# Patient Record
Sex: FEMALE | Race: WHITE | NOT HISPANIC OR LATINO | Employment: FULL TIME | URBAN - METROPOLITAN AREA
[De-identification: names, ages, dates, MRNs, and addresses within clinical notes are randomized per-mention and may not be internally consistent; named-entity substitution may affect disease eponyms.]

---

## 2019-08-19 ENCOUNTER — TRANSCRIBE ORDERS (OUTPATIENT)
Dept: ADMINISTRATIVE | Facility: HOSPITAL | Age: 29
End: 2019-08-19

## 2019-08-19 DIAGNOSIS — Z31.41 FERTILITY TESTING: Primary | ICD-10-CM

## 2019-08-29 ENCOUNTER — HOSPITAL ENCOUNTER (OUTPATIENT)
Dept: GENERAL RADIOLOGY | Facility: HOSPITAL | Age: 29
Discharge: HOME | End: 2019-08-29
Admitting: SPECIALIST

## 2019-08-29 DIAGNOSIS — Z31.41 FERTILITY TESTING: ICD-10-CM

## 2019-08-29 PROCEDURE — 25010000002 IOPAMIDOL 61 % SOLUTION: Performed by: SPECIALIST

## 2019-08-29 PROCEDURE — 74740 X-RAY FEMALE GENITAL TRACT: CPT

## 2019-08-29 RX ADMIN — IOPAMIDOL 10 ML: 612 INJECTION, SOLUTION INTRAVENOUS at 08:19

## 2020-01-04 PROBLEM — Z01.419 WELL WOMAN EXAM: Status: ACTIVE | Noted: 2020-01-04

## 2020-01-06 ENCOUNTER — OFFICE VISIT (OUTPATIENT)
Dept: OBSTETRICS AND GYNECOLOGY | Facility: CLINIC | Age: 30
End: 2020-01-06

## 2020-01-06 VITALS — DIASTOLIC BLOOD PRESSURE: 68 MMHG | RESPIRATION RATE: 14 BRPM | SYSTOLIC BLOOD PRESSURE: 112 MMHG | WEIGHT: 197 LBS

## 2020-01-06 DIAGNOSIS — N97.9 FEMALE INFERTILITY: Primary | ICD-10-CM

## 2020-01-06 PROCEDURE — 99203 OFFICE O/P NEW LOW 30 MIN: CPT | Performed by: OBSTETRICS & GYNECOLOGY

## 2020-01-06 NOTE — PROGRESS NOTES
Subjective   Chief Complaint   Patient presents with   • Fertility Preservation     Izzy Hernandez is a 29 y.o. year old .  Patient's last menstrual period was 2019 (approximate).  She presents to be seen because of wanting to discuss fertility.  She is been trying to conceive for over a year.  She is previously been a patient of [facility].  They have seen Dr. Quevedo but of overall not been happy with care and are looking for alternative options.  Work-up is included an HSG which was normal.  's semen analysis was abnormal.  Her  has seen Dr. Graves for a testicular exam which was normal.  Antibiotics were empirically tried.  They tried IUI once without success.  She has had no blood work today.    She is taking a prenatal vitamin.    Cycles are predictable and normal.  They occur roughly every 28 to 30 days.  She has no issues with nipple discharge, diplopia or significant recurring headaches.    The following portions of the patient's history were reviewed and updated as appropriate:current medications, allergies, past family history, past medical history, past social history and past surgical history    Social History    Tobacco Use      Smoking status: Never Smoker      Smokeless tobacco: Never Used         Objective   /68   Resp 14   Wt 89.4 kg (197 lb)   LMP 2019 (Approximate)   Breastfeeding No     Lab Review   No data reviewed    Imaging   HSG       Assessment   1. 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 At this point I think the next most pressing question is whether his semen analysis persistently abnormal.  If it remains persistently abnormal I think IUI plus or minus efforts with ovulation induction would be the next step.  If on the other hand repeat semen analysis is now normal, next up would be consideration for laparoscopic evaluation of endometriosis  2. Encourage them to see [facility] or Raul Gonzales in [place]  3. The importance of keeping all planned follow-up and taking all medications as prescribed was emphasized.  4. Follow up PRN          This note was electronically signed.    Isaias Wen M.D.  January 6, 2020    Total time spent today with Izzy  was 35 minutes (level 3).  Off this time, 90% was spent face-to-face time coordinating care, answering her questions and counseling regarding pathophysiology of her presenting problem along with plans for any diagnositc work-up and treatment.    Note: Speech recognition transcription software may have been used to create portions of this document.  An attempt at proofreading has been made but errors in transcription could still be present.

## 2020-03-16 ENCOUNTER — TELEPHONE (OUTPATIENT)
Dept: OBSTETRICS AND GYNECOLOGY | Facility: CLINIC | Age: 30
End: 2020-03-16

## 2020-03-16 NOTE — TELEPHONE ENCOUNTER
Patient scheduled New OB for 4/22/2020 and answered no to all the coronavirus questions. Negative.

## 2020-03-17 ENCOUNTER — LAB REQUISITION (OUTPATIENT)
Dept: LAB | Facility: HOSPITAL | Age: 30
End: 2020-03-17

## 2020-03-17 DIAGNOSIS — Z00.00 ROUTINE GENERAL MEDICAL EXAMINATION AT A HEALTH CARE FACILITY: ICD-10-CM

## 2020-03-17 LAB — HCG INTACT+B SERPL-ACNC: 282.3 MIU/ML

## 2020-03-17 PROCEDURE — 84702 CHORIONIC GONADOTROPIN TEST: CPT | Performed by: OBSTETRICS & GYNECOLOGY

## 2020-03-19 ENCOUNTER — LAB REQUISITION (OUTPATIENT)
Dept: LAB | Facility: HOSPITAL | Age: 30
End: 2020-03-19

## 2020-03-19 DIAGNOSIS — Z00.00 ROUTINE GENERAL MEDICAL EXAMINATION AT A HEALTH CARE FACILITY: ICD-10-CM

## 2020-03-19 LAB — HCG INTACT+B SERPL-ACNC: 662.9 MIU/ML

## 2020-03-19 PROCEDURE — 84702 CHORIONIC GONADOTROPIN TEST: CPT | Performed by: OBSTETRICS & GYNECOLOGY

## 2020-04-17 PROBLEM — Z34.00 SUPERVISION OF NORMAL FIRST PREGNANCY: Status: ACTIVE | Noted: 2020-04-17

## 2020-04-22 ENCOUNTER — APPOINTMENT (OUTPATIENT)
Dept: LAB | Facility: HOSPITAL | Age: 30
End: 2020-04-22

## 2020-04-22 ENCOUNTER — INITIAL PRENATAL (OUTPATIENT)
Dept: OBSTETRICS AND GYNECOLOGY | Facility: CLINIC | Age: 30
End: 2020-04-22

## 2020-04-22 VITALS
WEIGHT: 197 LBS | BODY MASS INDEX: 30.92 KG/M2 | DIASTOLIC BLOOD PRESSURE: 64 MMHG | SYSTOLIC BLOOD PRESSURE: 106 MMHG | HEIGHT: 67 IN

## 2020-04-22 DIAGNOSIS — Z34.01 ENCOUNTER FOR SUPERVISION OF NORMAL FIRST PREGNANCY IN FIRST TRIMESTER: Primary | ICD-10-CM

## 2020-04-22 LAB
ABO GROUP BLD: NORMAL
AMPHET+METHAMPHET UR QL: NEGATIVE
AMPHETAMINES UR QL: NEGATIVE
BARBITURATES UR QL SCN: NEGATIVE
BASOPHILS # BLD AUTO: 0.02 10*3/MM3 (ref 0–0.2)
BASOPHILS NFR BLD AUTO: 0.3 % (ref 0–1.5)
BENZODIAZ UR QL SCN: NEGATIVE
BLD GP AB SCN SERPL QL: NEGATIVE
BUPRENORPHINE SERPL-MCNC: NEGATIVE NG/ML
CANNABINOIDS SERPL QL: NEGATIVE
COCAINE UR QL: NEGATIVE
DEPRECATED RDW RBC AUTO: 38.9 FL (ref 37–54)
EOSINOPHIL # BLD AUTO: 0.03 10*3/MM3 (ref 0–0.4)
EOSINOPHIL NFR BLD AUTO: 0.4 % (ref 0.3–6.2)
ERYTHROCYTE [DISTWIDTH] IN BLOOD BY AUTOMATED COUNT: 12 % (ref 12.3–15.4)
HBV SURFACE AG SERPL QL IA: NORMAL
HCT VFR BLD AUTO: 36.7 % (ref 34–46.6)
HCV AB SER DONR QL: NORMAL
HGB BLD-MCNC: 12.9 G/DL (ref 12–15.9)
HIV1+2 AB SER QL: NORMAL
HOLD SPECIMEN: NORMAL
IMM GRANULOCYTES # BLD AUTO: 0.02 10*3/MM3 (ref 0–0.05)
IMM GRANULOCYTES NFR BLD AUTO: 0.3 % (ref 0–0.5)
LYMPHOCYTES # BLD AUTO: 1.35 10*3/MM3 (ref 0.7–3.1)
LYMPHOCYTES NFR BLD AUTO: 19.4 % (ref 19.6–45.3)
MCH RBC QN AUTO: 31.1 PG (ref 26.6–33)
MCHC RBC AUTO-ENTMCNC: 35.1 G/DL (ref 31.5–35.7)
MCV RBC AUTO: 88.4 FL (ref 79–97)
METHADONE UR QL SCN: NEGATIVE
MONOCYTES # BLD AUTO: 0.56 10*3/MM3 (ref 0.1–0.9)
MONOCYTES NFR BLD AUTO: 8 % (ref 5–12)
NEUTROPHILS # BLD AUTO: 4.99 10*3/MM3 (ref 1.7–7)
NEUTROPHILS NFR BLD AUTO: 71.6 % (ref 42.7–76)
NRBC BLD AUTO-RTO: 0 /100 WBC (ref 0–0.2)
OPIATES UR QL: NEGATIVE
OXYCODONE UR QL SCN: NEGATIVE
PCP UR QL SCN: NEGATIVE
PLATELET # BLD AUTO: 238 10*3/MM3 (ref 140–450)
PMV BLD AUTO: 11.3 FL (ref 6–12)
PROPOXYPH UR QL: NEGATIVE
RBC # BLD AUTO: 4.15 10*6/MM3 (ref 3.77–5.28)
RH BLD: POSITIVE
RPR SER QL: NORMAL
RUBV IGG SERPL IA-ACNC: POSITIVE
TRICYCLICS UR QL SCN: NEGATIVE
WBC NRBC COR # BLD: 6.97 10*3/MM3 (ref 3.4–10.8)

## 2020-04-22 PROCEDURE — 80081 OBSTETRIC PANEL INC HIV TSTG: CPT | Performed by: OBSTETRICS & GYNECOLOGY

## 2020-04-22 PROCEDURE — 86803 HEPATITIS C AB TEST: CPT | Performed by: OBSTETRICS & GYNECOLOGY

## 2020-04-22 PROCEDURE — 80306 DRUG TEST PRSMV INSTRMNT: CPT | Performed by: OBSTETRICS & GYNECOLOGY

## 2020-04-22 PROCEDURE — 0501F PRENATAL FLOW SHEET: CPT | Performed by: OBSTETRICS & GYNECOLOGY

## 2020-04-22 PROCEDURE — 36415 COLL VENOUS BLD VENIPUNCTURE: CPT | Performed by: OBSTETRICS & GYNECOLOGY

## 2020-04-22 PROCEDURE — 87086 URINE CULTURE/COLONY COUNT: CPT | Performed by: OBSTETRICS & GYNECOLOGY

## 2020-04-22 RX ORDER — CHORIOGONADOTROPIN ALFA 250 UG/.5ML
INJECTION, SOLUTION SUBCUTANEOUS
COMMUNITY
Start: 2020-02-18 | End: 2020-04-22

## 2020-04-22 RX ORDER — LETROZOLE 2.5 MG/1
TABLET, FILM COATED ORAL
COMMUNITY
Start: 2020-02-18 | End: 2020-04-22

## 2020-04-22 NOTE — PROGRESS NOTES
Subjective   Chief Complaint   Patient presents with   • Initial Prenatal Visit       Izzy Hernandez is a 30 y.o. year old .  Patient's last menstrual period was 2020.  She presents to be seen to initiate prenatal care.     Social History    Tobacco Use      Smoking status: Never Smoker      Smokeless tobacco: Never Used      The following portions of the patient's history were reviewed and updated as appropriate:vital signs, allergies, current medications, past medical history, past social history, past surgical history and problem list.    Objective   Lab Review   No data reviewed    Imaging   Pelvic ultrasound report    Assessment/Plan   ASSESSMENT  1. 30 y.o. year old  at 9w2d  2. Supervision of low risk pregnancy    PLAN  1. The problem list for pregnancy was initiated today  2. Tests ordered today:  Orders Placed This Encounter   Procedures   • Urine Culture - Urine, Urine, Clean Catch   • Chlamydia trachomatis, Neisseria gonorrhoeae, PCR w/ confirmation - Urine, Urine, Clean Catch   • Obstetric Panel   • HIV-1 / O / 2 Ag / Antibody 4th Generation   • Urine Drug Screen - Urine, Clean Catch     Please confirm all positive UDS     3. Testing for GC / Chlamydia / trichomonas was done today  4. Genetic testing reviewed: MSAFP-4, NIPT (Panorama) and they will consider the information and make a decision at a later date.  5. Information reviewed: exercise in pregnancy, nutrition in pregnancy, weight gain in pregnancy, work and travel restrictions during pregnancy, list of OTC medications acceptable in pregnancy and call coverage groups    Total time spent today with Izzy  was 30 minutes (level 4).  Off this time, 100% was spent face-to-face time coordinating care, answering her questions and counseling regarding exercise in pregnancy, nutrition in pregnancy, weight gain in pregnancy, work and travel restrictions during pregnancy, list of OTC medications acceptable in pregnancy and call  coverage groups.    Follow up: 3 week(s)       This note was electronically signed.    Isaias Wen MD  April 22, 2020

## 2020-04-22 NOTE — PROGRESS NOTES
"Subjective   Chief Complaint   Patient presents with   • Initial Prenatal Visit       Izzy Hernandez is a 30 y.o. year old .  Patient's last menstrual period was 2020.  She presents to be seen to initiate prenatal care.     Social History    Tobacco Use      Smoking status: Never Smoker      Smokeless tobacco: Never Used      The following portions of the patient's history were reviewed and updated as appropriate:vital signs, allergies, current medications, past medical history, past social history, past surgical history and problem list.    Objective   Lab Review   {Review - gyn labs:59419::\"No data reviewed\"}    Imaging   {Review - imagin::\"No data reviewed\"}    Assessment/Plan   ASSESSMENT  1. 30 y.o. year old  at ***  2. {Pregnancy Imp:04847::\"Supervision of high risk pregnancy\"}    PLAN  1. The problem list for pregnancy was initiated today  2. Tests ordered today:  No orders of the defined types were placed in this encounter.    3. Testing for GC / Chlamydia / trichomonas {Actions; was done:89983::\"will need to be done at her next prenatal visit\"}  4. Genetic testing reviewed: {Genetic testing options:15931}  5. Information reviewed: {Pregnancy - first PN visit checklist:36955::\"exercise in pregnancy\",\"nutrition in pregnancy\",\"weight gain in pregnancy\",\"work and travel restrictions during pregnancy\",\"list of OTC medications acceptable in pregnancy\",\"call coverage groups\"}    Total time spent today with Izzy  was {Time spent:12684}.  Off this time, {Percentage:39847::\"> 50%\"} was spent face-to-face time coordinating care, answering her questions and counseling regarding {Counseling topics:78912::\"pathophysiology of her presenting problem along with plans for any diagnositc work-up and treatment\"}.    Follow up: {numbers 1-12:} {DAYS, WEEKS, MONTHS, YEARS:86516}       This note was electronically signed.    Isaias Wen MD  2020      "

## 2020-04-23 LAB — BACTERIA SPEC AEROBE CULT: NORMAL

## 2020-05-13 ENCOUNTER — ROUTINE PRENATAL (OUTPATIENT)
Dept: OBSTETRICS AND GYNECOLOGY | Facility: CLINIC | Age: 30
End: 2020-05-13

## 2020-05-13 ENCOUNTER — APPOINTMENT (OUTPATIENT)
Dept: LAB | Facility: HOSPITAL | Age: 30
End: 2020-05-13

## 2020-05-13 VITALS — BODY MASS INDEX: 30.38 KG/M2 | SYSTOLIC BLOOD PRESSURE: 108 MMHG | WEIGHT: 194 LBS | DIASTOLIC BLOOD PRESSURE: 70 MMHG

## 2020-05-13 DIAGNOSIS — Z34.01 ENCOUNTER FOR SUPERVISION OF NORMAL FIRST PREGNANCY IN FIRST TRIMESTER: Primary | ICD-10-CM

## 2020-05-13 DIAGNOSIS — Z72.51 HIGH RISK HETEROSEXUAL BEHAVIOR: ICD-10-CM

## 2020-05-13 LAB
2ND TRIMESTER 4 SCREEN SERPL-IMP: NORMAL
EXTERNAL NIPT: NORMAL

## 2020-05-13 PROCEDURE — 87591 N.GONORRHOEAE DNA AMP PROB: CPT | Performed by: OBSTETRICS & GYNECOLOGY

## 2020-05-13 PROCEDURE — 87491 CHLMYD TRACH DNA AMP PROBE: CPT | Performed by: OBSTETRICS & GYNECOLOGY

## 2020-05-13 PROCEDURE — 0502F SUBSEQUENT PRENATAL CARE: CPT | Performed by: OBSTETRICS & GYNECOLOGY

## 2020-05-13 NOTE — PROGRESS NOTES
Chief Complaint   Patient presents with   • Routine Prenatal Visit       HPI: Izzy is a  currently at 12w2d who today reports the following:  Nausea - YES; Vaginal bleeding -  No; Heartburn - No.    ROS:  GI: Constipation - No; Diarrhea - No    Neuro: Headache - No; Visual change - No    Respiratory: Cough - No; SOB - No; fever - No     EXAM:  Vitals: See prenatal flowsheet   Abdomen: See prenatal flowsheet   Urine glucose/protein: See prenatal flowsheet   Pelvic: See prenatal flowsheet     Prenatal Labs  Lab Results   Component Value Date    HGB 12.9 2020    RUBELLAABIGG Positive 2020    HEPBSAG Non-Reactive 2020    ABSCRN Negative 2020    CTI1MDZ0 Non-Reactive 2020    HEPCVIRUSABY Non-Reactive 2020    URINECX <10,000 CFU/mL Mixed Nicole Isolated 2020       MDM:  Impression: 1. Supervision of low risk pregnancy   Tests done today: 1. none   Topics discussed: 1. Continue with PNV's  2. Prenatal labs reviewed  3. Questions answered regarding COVID-19 and revision of office schedule of visits due to pandemic   4. Today we discussed genetic testing.  She is aware that the MSAFP-4 and NIPT - Panorama are screening tests.  A screening test is not a diagnostic test.  This means that a negative test does not guarantee an unaffected fetus and a positive test does not mean the fetus has the condition for which the test is being performed.  If the test returns positive, a diagnostic test should be consider to determine if the fetus in fact has the condition.  After considering the options previously presented, she is not interested in having genetic testing performed.   Tests scheduled today for her next visit:   none

## 2020-05-21 LAB
C TRACH RRNA SPEC DONR QL NAA+PROBE: NEGATIVE
N GONORRHOEA DNA SPEC QL NAA+PROBE: NEGATIVE

## 2020-06-10 ENCOUNTER — TELEMEDICINE (OUTPATIENT)
Dept: OBSTETRICS AND GYNECOLOGY | Facility: CLINIC | Age: 30
End: 2020-06-10

## 2020-06-10 DIAGNOSIS — Z34.02 ENCOUNTER FOR SUPERVISION OF NORMAL FIRST PREGNANCY IN SECOND TRIMESTER: Primary | ICD-10-CM

## 2020-06-10 PROCEDURE — 0502F SUBSEQUENT PRENATAL CARE: CPT | Performed by: OBSTETRICS & GYNECOLOGY

## 2020-06-10 NOTE — PROGRESS NOTES
Chief Complaint   Patient presents with   • Routine Prenatal Visit       You have chosen to receive care through a telehealth visit.  Do you consent to use a video/audio connection for your medical care today? YES    HPI: Izzy is a  currently at 16w2d who presents via video-conference reporting the following:  Contractions - No; Leaking - No; Vaginal bleeding -  No; Swelling of extremities - No.    ROS:  GI: Nausea - No; Constipation - No; Diarrhea - No    Neuro: Headache - No; Visual change - No    Respiratory: Cough - No; SOB - No; fever - No     EXAM:  Vitals: See prenatal flowsheet   Abdomen: See prenatal flowsheet   Urine glucose/protein: See prenatal flowsheet   Pelvic: See prenatal flowsheet   MDM:   Impression: 1. Supervision of low risk pregnancy   Tests done today: 1. none   Topics discussed: 1. Continue with PNV's  2. Prenatal labs reviewed  3. Questions answered regarding COVID-19 and revision of office schedule of visits due to pandemic  4. No additional counseling given - she has no specific complaints or concerns   Tests scheduled today for her next visit:   U/S for anatomic screening     Total time spent today with Izzy  was 20 minutes (level 3).  Off this time, 100% was spent coordinating care, answering her questions and counseling regarding exercise in pregnancy, nutrition in pregnancy, weight gain in pregnancy, work and travel restrictions during pregnancy, list of OTC medications acceptable in pregnancy and call coverage groups

## 2020-06-17 ENCOUNTER — TELEPHONE (OUTPATIENT)
Dept: OBSTETRICS AND GYNECOLOGY | Facility: CLINIC | Age: 30
End: 2020-06-17

## 2020-06-17 NOTE — TELEPHONE ENCOUNTER
Left message to call office    America Keys MA  You 3 hours ago (9:28 AM)      Patient states she had spotting once last night nothing today. Patient calling to let us know.  She has had no cramping and states no intercourse.  She has not noticed any pain or pressure with urination.  Patient advised to avoid intercourse, pelvic rest 24 hours call office with any other concerns.  Do you have any other suggestions for patient?    Routing comment

## 2020-06-17 NOTE — TELEPHONE ENCOUNTER
fanta pt called stating she is 17 weeks pregnant and had a one time spotting case last night that was bright red but ended brown, no cramping, no recent intercourse, none this morning. Please contact and advise what she should do

## 2020-06-24 ENCOUNTER — TELEPHONE (OUTPATIENT)
Dept: OBSTETRICS AND GYNECOLOGY | Facility: CLINIC | Age: 30
End: 2020-06-24

## 2020-06-24 NOTE — TELEPHONE ENCOUNTER
18 wks pregnant, woke up this morning with really sharp pains on her right side.  Really bad pain. No other problems except the pain.

## 2020-06-24 NOTE — TELEPHONE ENCOUNTER
If it is significant issues you may need to be seen to evaluated to find out why she is having right-sided pain

## 2020-06-25 ENCOUNTER — ROUTINE PRENATAL (OUTPATIENT)
Dept: OBSTETRICS AND GYNECOLOGY | Facility: CLINIC | Age: 30
End: 2020-06-25

## 2020-06-25 VITALS — SYSTOLIC BLOOD PRESSURE: 118 MMHG | WEIGHT: 196.8 LBS | DIASTOLIC BLOOD PRESSURE: 70 MMHG | BODY MASS INDEX: 30.82 KG/M2

## 2020-06-25 DIAGNOSIS — Z34.02 ENCOUNTER FOR SUPERVISION OF NORMAL FIRST PREGNANCY IN SECOND TRIMESTER: ICD-10-CM

## 2020-06-25 PROCEDURE — 0502F SUBSEQUENT PRENATAL CARE: CPT | Performed by: OBSTETRICS & GYNECOLOGY

## 2020-06-25 NOTE — PROGRESS NOTES
Chief Complaint   Patient presents with   • Pregnancy Problem     pt c/o sharp pain in her lower right pelvic region, pt states she has taken Gas X, pt states she woke up today and it is worse today       HPI: Izzy is a  currently at 18w3d who today reports the following:  Contractions - No; Leaking - No; Vaginal bleeding -  No; Heartburn - No.    Patient reports a sharp pain in right lower quadrant that began yesterday. She reports it is intermittent and notices it more with positioning changes. She states it is not elicit by touching the abdomen. She denies fever, chills, nausea, vomiting, diarrhea, decreased appetite or dysuria. She does report she is having issues with constipation and noticed worsening pain yesterday with defecation.    ROS:  GI: Nausea - No ; Constipation - YES; Diarrhea - No    Neuro: Headache - No ; Visual change - No      EXAM:  Vitals: See prenatal flowsheet   Abdomen: See prenatal flowsheet   Urine glucose/protein: See prenatal flowsheet   Pelvic: See prenatal flowsheet     Lab Results   Component Value Date    ABO A 2020    RH Positive 2020    ABSCRN Negative 2020       MDM:  Impression: 1. Supervision of low risk pregnancy  2. Lower Abdominal Pain   3. Constipation   Tests done today: 1. Urine Culture   Topics discussed: 1. Continue with PNV's  2. Prenatal labs reviewed  3. Abdominal Exam: no tenderness to palpation, no rebound, rigidity or guarding. Pain elicited with movement of uterus. Likely secondary to round ligament pain and constipation. Encourage increase water intake and fiber in her diet as well as use of stool softeners. Can take Tylenol as needed. Instructed to call with worsening symptoms.  4. No additional counseling given - she has no specific complaints or concerns   Tests scheduled today for her next visit:   U/S for anatomic screening

## 2020-07-06 ENCOUNTER — ROUTINE PRENATAL (OUTPATIENT)
Dept: OBSTETRICS AND GYNECOLOGY | Facility: CLINIC | Age: 30
End: 2020-07-06

## 2020-07-06 VITALS — SYSTOLIC BLOOD PRESSURE: 122 MMHG | WEIGHT: 201 LBS | DIASTOLIC BLOOD PRESSURE: 74 MMHG | BODY MASS INDEX: 31.48 KG/M2

## 2020-07-06 DIAGNOSIS — Z34.02 ENCOUNTER FOR SUPERVISION OF NORMAL FIRST PREGNANCY IN SECOND TRIMESTER: Primary | ICD-10-CM

## 2020-07-06 PROCEDURE — 0502F SUBSEQUENT PRENATAL CARE: CPT | Performed by: OBSTETRICS & GYNECOLOGY

## 2020-07-06 NOTE — PROGRESS NOTES
Chief Complaint   Patient presents with   • Routine Prenatal Visit       HPI: Izzy is a  currently at 20w0d who today reports the following:  Contractions - No; Leaking - No; Vaginal bleeding - No; Heartburn - YES.    ROS:  GI: Nausea - No ; Constipation - No; Diarrhea - No    Neuro: Headache - No ; Visual change - No    Respiratory: Cough - No; SOB - No; fever - No     EXAM:  Vitals: See prenatal flowsheet   Abdomen: See prenatal flowsheet   Urine glucose/protein: See prenatal flowsheet   Pelvic: See prenatal flowsheet     Lab Results   Component Value Date    ABO A 2020    RH Positive 2020    ABSCRN Negative 2020       MDM:  Impression: 1. Supervision of low risk pregnancy  2. GERD in pregnancy   Tests ordered/done today: 1. U/S for anatomic screening - U/S report is not available for review at this time   Counselin. Prenatal labs reviewed  2. Need to continue with PNV's  3. Questions answered regarding COVID-19 and revision of office schedule of visits due to pandemic  4. OTC medical options for her GERD   5. Circumcision was discussed.  It was explained to Izzy that the procedure is elective.  There are probably no long-term medical benefits for circumcision.  Studies have suggested in the first year of life, there may be a reduction in urinary tract infections in a circumcised male's.  This difference disappears after the first year of life.  Some have suggested that circumcision reduces nerve endings and the tip of the penis which could have an impact to him as he ages.  There are small risks of the procedure including taking off to much or too little skin from the foreskin.  He will be anesthetized and most of the time this is successful.  It cannot be guaranteed that the child will feel no pain.  After hearing this information, she wishes to have her son circumsized.   Tests ordered today for her next visit:   none

## 2020-07-09 DIAGNOSIS — Z36.83 ENCOUNTER FOR FETAL SCREENING FOR CONGENITAL CARDIAC ABNORMALITIES: Primary | ICD-10-CM

## 2020-07-13 ENCOUNTER — TELEPHONE (OUTPATIENT)
Dept: OBSTETRICS AND GYNECOLOGY | Facility: CLINIC | Age: 30
End: 2020-07-13

## 2020-07-20 ENCOUNTER — HOSPITAL ENCOUNTER (OUTPATIENT)
Dept: WOMENS IMAGING | Facility: HOSPITAL | Age: 30
Discharge: HOME | End: 2020-07-20
Admitting: OBSTETRICS & GYNECOLOGY

## 2020-07-20 ENCOUNTER — OFFICE VISIT (OUTPATIENT)
Dept: OBSTETRICS AND GYNECOLOGY | Facility: HOSPITAL | Age: 30
End: 2020-07-20

## 2020-07-20 VITALS
DIASTOLIC BLOOD PRESSURE: 79 MMHG | SYSTOLIC BLOOD PRESSURE: 113 MMHG | WEIGHT: 201.4 LBS | HEIGHT: 66 IN | BODY MASS INDEX: 32.37 KG/M2

## 2020-07-20 DIAGNOSIS — Z03.73 FETAL ANOMALY SUSPECTED BUT NOT FOUND: ICD-10-CM

## 2020-07-20 DIAGNOSIS — Z36.83 ENCOUNTER FOR FETAL SCREENING FOR CONGENITAL CARDIAC ABNORMALITIES: ICD-10-CM

## 2020-07-20 DIAGNOSIS — Z36.83 ENCOUNTER FOR FETAL SCREENING FOR CONGENITAL CARDIAC ABNORMALITIES: Primary | ICD-10-CM

## 2020-07-20 DIAGNOSIS — Z34.90 PREGNANCY, UNSPECIFIED GESTATIONAL AGE: ICD-10-CM

## 2020-07-20 PROBLEM — Z31.81 FEMALE INFERTILITY ASSOCIATED WITH MALE FACTORS: Status: ACTIVE | Noted: 2020-07-20

## 2020-07-20 PROBLEM — N97.8 FEMALE INFERTILITY ASSOCIATED WITH MALE FACTORS: Status: ACTIVE | Noted: 2020-07-20

## 2020-07-20 PROBLEM — Z86.006 H/O MELANOMA IN SITU: Status: ACTIVE | Noted: 2018-06-01

## 2020-07-20 PROCEDURE — 76811 OB US DETAILED SNGL FETUS: CPT | Performed by: OBSTETRICS & GYNECOLOGY

## 2020-07-20 PROCEDURE — 76811 OB US DETAILED SNGL FETUS: CPT

## 2020-07-20 NOTE — PROGRESS NOTES
Documentation of the ultasound findings, images, and interpretations will be available in the patient's Viewpoint report located in the Chart Review Imaging tab in [facility].    Answers for HPI/ROS submitted by the patient on 7/18/2020   What is the primary reason for your visit?: Other  Please describe your symptoms.: Referred by OBGYN  Have you had these symptoms before?: No  How long have you been having these symptoms?: 1-4 days

## 2020-07-21 ENCOUNTER — APPOINTMENT (OUTPATIENT)
Dept: WOMENS IMAGING | Facility: HOSPITAL | Age: 30
End: 2020-07-21

## 2020-08-17 ENCOUNTER — LAB (OUTPATIENT)
Dept: LAB | Facility: HOSPITAL | Age: 30
End: 2020-08-17

## 2020-08-17 ENCOUNTER — ROUTINE PRENATAL (OUTPATIENT)
Dept: OBSTETRICS AND GYNECOLOGY | Facility: CLINIC | Age: 30
End: 2020-08-17

## 2020-08-17 VITALS — SYSTOLIC BLOOD PRESSURE: 118 MMHG | WEIGHT: 214 LBS | DIASTOLIC BLOOD PRESSURE: 74 MMHG | BODY MASS INDEX: 35.07 KG/M2

## 2020-08-17 DIAGNOSIS — R12 HEARTBURN DURING PREGNANCY, SECOND TRIMESTER: ICD-10-CM

## 2020-08-17 DIAGNOSIS — O26.892 HEARTBURN DURING PREGNANCY, SECOND TRIMESTER: ICD-10-CM

## 2020-08-17 DIAGNOSIS — Z34.02 ENCOUNTER FOR SUPERVISION OF NORMAL FIRST PREGNANCY IN SECOND TRIMESTER: Primary | ICD-10-CM

## 2020-08-17 DIAGNOSIS — Z34.02 ENCOUNTER FOR SUPERVISION OF NORMAL FIRST PREGNANCY IN SECOND TRIMESTER: ICD-10-CM

## 2020-08-17 PROBLEM — O99.019 MATERNAL ANEMIA IN PREGNANCY, ANTEPARTUM: Status: ACTIVE | Noted: 2020-08-17

## 2020-08-17 LAB
GLUCOSE 1H P 100 G GLC PO SERPL-MCNC: 122 MG/DL (ref 65–140)
HCT VFR BLD AUTO: 31.2 % (ref 34–46.6)
HGB BLD-MCNC: 10.6 G/DL (ref 12–15.9)

## 2020-08-17 PROCEDURE — 36415 COLL VENOUS BLD VENIPUNCTURE: CPT

## 2020-08-17 PROCEDURE — 0502F SUBSEQUENT PRENATAL CARE: CPT | Performed by: OBSTETRICS & GYNECOLOGY

## 2020-08-17 PROCEDURE — 85018 HEMOGLOBIN: CPT

## 2020-08-17 PROCEDURE — 85014 HEMATOCRIT: CPT

## 2020-08-17 PROCEDURE — 82950 GLUCOSE TEST: CPT

## 2020-08-17 RX ORDER — FERROUS SULFATE 325(65) MG
325 TABLET ORAL
Qty: 60 TABLET | Refills: 4 | Status: ON HOLD | OUTPATIENT
Start: 2020-08-17 | End: 2020-11-04 | Stop reason: SDUPTHER

## 2020-08-17 NOTE — PROGRESS NOTES
Chief Complaint   Patient presents with   • Routine Prenatal Visit       HPI: Izzy is a  currently at 26w0d who today reports the following:  Contractions - No; Leaking - No; Vaginal bleeding - No; Heartburn - YES.    ROS:  GI: Nausea - No ; Constipation - No; Diarrhea - No    Neuro: Headache - No ; Visual change - No    Respiratory: Cough - No; SOB - No; fever - No     EXAM:  Vitals: See prenatal flowsheet   Abdomen: See prenatal flowsheet   Urine glucose/protein: See prenatal flowsheet   Pelvic: See prenatal flowsheet     Lab Results   Component Value Date    ABO A 2020    RH Positive 2020    ABSCRN Negative 2020       MDM:  Impression: 1. Supervision of low risk pregnancy  2. GERD in pregnancy   Tests ordered/done today: 1. GCT  2. HgB   Counselin. Prenatal labs reviewed  2. Need to continue with PNV's  3. Questions answered regarding COVID-19 and revision of office schedule of visits due to pandemic  4. OTC medical options for her GERD   5. Watch weight   Tests ordered today for her next visit:   none

## 2020-08-18 ENCOUNTER — TELEPHONE (OUTPATIENT)
Dept: OBSTETRICS AND GYNECOLOGY | Facility: CLINIC | Age: 30
End: 2020-08-18

## 2020-08-18 NOTE — TELEPHONE ENCOUNTER
Dr. Wen' patient 26w1d  [phone] returned patient's call and advised her she passed her 1hr glucose test. Patient verbalized understanding.

## 2020-08-31 PROBLEM — O26.892 HEARTBURN DURING PREGNANCY, SECOND TRIMESTER: Status: RESOLVED | Noted: 2020-08-17 | Resolved: 2020-08-31

## 2020-08-31 PROBLEM — K59.00 CONSTIPATION IN PREGNANCY: Status: ACTIVE | Noted: 2020-08-31

## 2020-08-31 PROBLEM — O99.619 CONSTIPATION IN PREGNANCY: Status: ACTIVE | Noted: 2020-08-31

## 2020-08-31 PROBLEM — R12 HEARTBURN DURING PREGNANCY, SECOND TRIMESTER: Status: RESOLVED | Noted: 2020-08-17 | Resolved: 2020-08-31

## 2020-09-02 ENCOUNTER — ROUTINE PRENATAL (OUTPATIENT)
Dept: OBSTETRICS AND GYNECOLOGY | Facility: CLINIC | Age: 30
End: 2020-09-02

## 2020-09-02 VITALS — SYSTOLIC BLOOD PRESSURE: 120 MMHG | WEIGHT: 215 LBS | DIASTOLIC BLOOD PRESSURE: 72 MMHG | BODY MASS INDEX: 35.23 KG/M2

## 2020-09-02 DIAGNOSIS — Z34.02 ENCOUNTER FOR SUPERVISION OF NORMAL FIRST PREGNANCY IN SECOND TRIMESTER: Primary | ICD-10-CM

## 2020-09-02 PROCEDURE — 0502F SUBSEQUENT PRENATAL CARE: CPT | Performed by: OBSTETRICS & GYNECOLOGY

## 2020-09-02 NOTE — PATIENT INSTRUCTIONS
Tdap Vaccine (Tetanus, Diphtheria and Pertussis): What You Need to Know      1. Why get vaccinated?  Tetanus, diphtheria and pertussis are very serious diseases. Tdap vaccine can protect us from these diseases. And, Tdap vaccine given to pregnant women can protect  babies against pertussis..  TETANUS (Lockjaw) is rare in the [place] today. It causes painful muscle tightening and stiffness, usually all over the body.  · It can lead to tightening of muscles in the head and neck so you can't open your mouth, swallow, or sometimes even breathe. Tetanus kills about 1 out of 10 people who are infected even after receiving the best medical care.  DIPHTHERIA is also rare in the [place] today. It can cause a thick coating to form in the back of the throat.  · It can lead to breathing problems, heart failure, paralysis, and death.  PERTUSSIS (Whooping Cough) causes severe coughing spells, which can cause difficulty breathing, vomiting and disturbed sleep.  · It can also lead to weight loss, incontinence, and rib fractures. Up to 2 in 100 adolescents and 5 in 100 adults with pertussis are hospitalized or have complications, which could include pneumonia or death.    These diseases are caused by bacteria. Diphtheria and pertussis are spread from person to person through secretions from coughing or sneezing. Tetanus enters the body through cuts, scratches, or wounds.  Before vaccines, as many as 200,000 cases of diphtheria, 200,000 cases of pertussis, and hundreds of cases of tetanus, were reported in the [place] each year. Since vaccination began, reports of cases for tetanus and diphtheria have dropped by about 99% and for pertussis by about 80%.    2. Tdap vaccine  • Tdap vaccine can protect adolescents and adults from tetanus, diphtheria, and pertussis. One dose of Tdap is routinely given at age 11 or 12. People who did not get Tdap at that age should get it as soon as possible.  • Tdap is especially  important for healthcare professionals and anyone having close contact with a baby younger than 12 months.  • Pregnant women should get a dose of Tdap during every pregnancy, to protect the  from pertussis. Infants are most at risk for severe, life-threatening complications from pertussis.  • Another vaccine, called Td, protects against tetanus and diphtheria, but not pertussis. A Td booster should be given every 10 years. Tdap may be given as one of these boosters if you have never gotten Tdap before. Tdap may also be given after a severe cut or burn to prevent tetanus infection.  • Your doctor or the person giving you the vaccine can give you more information.  • Tdap may safely be given at the same time as other vaccines.    3. Some people should not get this vaccine  · A person who has ever had a life-threatening allergic reaction after a previous dose of any diphtheria, tetanus or pertussis containing vaccine, OR has a severe allergy to any part of this vaccine, should not get Tdap vaccine. Tell the person giving the vaccine about any severe allergies.  · Anyone who had coma or long repeated seizures within 7 days after a childhood dose of DTP or DTaP, or a previous dose of Tdap, should not get Tdap, unless a cause other than the vaccine was found. They can still get Td.  · Talk to your doctor if you:  ? have seizures or another nervous system problem,  ? had severe pain or swelling after any vaccine containing diphtheria, tetanus or pertussis,  ? ever had a condition called Guillain-Barré Syndrome (GBS),  ? aren't feeling well on the day the shot is scheduled.    4. Risks  With any medicine, including vaccines, there is a chance of side effects. These are usually mild and go away on their own. Serious reactions are also possible but are rare.  Most people who get Tdap vaccine do not have any problems with it.  Mild problems following Tdap  (Did not interfere with activities)  · Pain where the shot was  given (about 3 in 4 adolescents or 2 in 3 adults)  · Redness or swelling where the shot was given (about 1 person in 5)  · Mild fever of at least 100.4°F (up to about 1 in 25 adolescents or 1 in 100 adults)  · Headache (about 3 or 4 people in 10)  · Tiredness (about 1 person in 3 or 4)  · Nausea, vomiting, diarrhea, stomach ache (up to 1 in 4 adolescents or 1 in 10 adults)  · Chills, sore joints (about 1 person in 10)  · Body aches (about 1 person in 3 or 4)  · Rash, swollen glands (uncommon)  Moderate problems following Tdap  (Interfered with activities, but did not require medical attention)  · Pain where the shot was given (up to 1 in 5 or 6)  · Redness or swelling where the shot was given (up to about 1 in 16 adolescents or 1 in 12 adults)  · Fever over 102°F (about 1 in 100 adolescents or 1 in 250 adults)  · Headache (about 1 in 7 adolescents or 1 in 10 adults)  · Nausea, vomiting, diarrhea, stomach ache (up to 1 or 3 people in 100)  · Swelling of the entire arm where the shot was given (up to about 1 in 500).  Severe problems following Tdap  (Unable to perform usual activities; required medical attention)  · Swelling, severe pain, bleeding and redness in the arm where the shot was given (rare).  Problems that could happen after any vaccine:  · People sometimes faint after a medical procedure, including vaccination. Sitting or lying down for about 15 minutes can help prevent fainting, and injuries caused by a fall. Tell your doctor if you feel dizzy, or have vision changes or ringing in the ears.  · Some people get severe pain in the shoulder and have difficulty moving the arm where a shot was given. This happens very rarely.  · Any medication can cause a severe allergic reaction. Such reactions from a vaccine are very rare, estimated at fewer than 1 in a million doses, and would happen within a few minutes to a few hours after the vaccination.  · As with any medicine, there is a very remote chance of a vaccine  causing a serious injury or death.  · The safety of vaccines is always being monitored. For more information, visit: www.cdc.gov/vaccinesafety/    5. What if there is a serious problem?  What should I look for?  · Look for anything that concerns you, such as signs of a severe allergic reaction, very high fever, or unusual behavior.  · Signs of a severe allergic reaction can include hives, swelling of the face and throat, difficulty breathing, a fast heartbeat, dizziness, and weakness. These would usually start a few minutes to a few hours after the vaccination.  What should I do?  · If you think it is a severe allergic reaction or other emergency that can't wait, call 9-1-1 or get the person to the nearest hospital. Otherwise, call your doctor.  · Afterward, the reaction should be reported to the Vaccine Adverse Event Reporting System (VAERS). Your doctor might file this report, or you can do it yourself through the VAERS web site at www.vaers.[facility].gov, or by calling 1-589.866.9408.  · VAERS does not give medical advice.    6. The National Vaccine Injury Compensation Program  The National Vaccine Injury Compensation Program (VICP) is a federal program that was created to compensate people who may have been injured by certain vaccines.  Persons who believe they may have been injured by a vaccine can learn about the program and about filing a claim by calling 1-531.864.6746 or visiting the VICP website at www.hrsa.gov/vaccinecompensation. There is a time limit to file a claim for compensation.    7. How can I learn more?  · Ask your doctor. He or she can give you the vaccine package insert or suggest other sources of information.  · Call your local or state health department.  · Contact the Centers for Disease Control and Prevention (CDC):  ? Call 1-259.315.4877 (1-490-RMG-INFO) or  ? Visit CDC's website at www.cdc.gov/vaccines      Vaccine Information Statement Tdap Vaccine (2/24/2015)  This information is not  intended to replace advice given to you by your health care provider. Make sure you discuss any questions you have with your health care provider.  Document Released: 06/18/2013 Document Revised: 08/05/2019 Document Reviewed: 08/05/2019  [employer] Interactive Patient Education © 2019 [employer]           Influenza (Flu) Vaccine (Inactivated or Recombinant): What You Need to Know      1. Why get vaccinated?  Influenza vaccine can prevent influenza (flu).  Flu is a contagious disease that spreads around the [place] every year, usually between October and May. Anyone can get the flu, but it is more dangerous for some people. Infants and young children, people 65 years of age and older, pregnant women, and people with certain health conditions or a weakened immune system are at greatest risk of flu complications.  Pneumonia, bronchitis, sinus infections and ear infections are examples of flu-related complications. If you have a medical condition, such as heart disease, cancer or diabetes, flu can make it worse.  Flu can cause fever and chills, sore throat, muscle aches, fatigue, cough, headache, and runny or stuffy nose. Some people may have vomiting and diarrhea, though this is more common in children than adults.  Each year thousands of people in the [place] die from flu, and many more are hospitalized. Flu vaccine prevents millions of illnesses and flu-related visits to the doctor each year.  2. Influenza vaccine  CDC recommends everyone 6 months of age and older get vaccinated every flu season. Children 6 months through 8 years of age may need 2 doses during a single flu season. Everyone else needs only 1 dose each flu season.  It takes about 2 weeks for protection to develop after vaccination.  There are many flu viruses, and they are always changing. Each year a new flu vaccine is made to protect against three or four viruses that are likely to cause disease in the upcoming flu season. Even when the  vaccine doesn't exactly match these viruses, it may still provide some protection.  Influenza vaccine does not cause flu.  Influenza vaccine may be given at the same time as other vaccines.  3. Talk with your health care provider  Tell your vaccine provider if the person getting the vaccine:  · Has had an allergic reaction after a previous dose of influenza vaccine, or has any severe, life-threatening allergies.  · Has ever had Guillain-Barré Syndrome (also called GBS).  In some cases, your health care provider may decide to postpone influenza vaccination to a future visit.  People with minor illnesses, such as a cold, may be vaccinated. People who are moderately or severely ill should usually wait until they recover before getting influenza vaccine.  Your health care provider can give you more information.  4. Risks of a vaccine reaction  · Soreness, redness, and swelling where shot is given, fever, muscle aches, and headache can happen after influenza vaccine.  · There may be a very small increased risk of Guillain-Barré Syndrome (GBS) after inactivated influenza vaccine (the flu shot).  · Young children who get the flu shot along with pneumococcal vaccine (PCV13), and/or DTaP vaccine at the same time might be slightly more likely to have a seizure caused by fever. Tell your health care provider if a child who is getting flu vaccine has ever had a seizure.  · People sometimes faint after medical procedures, including vaccination. Tell your provider if you feel dizzy or have vision changes or ringing in the ears.  · As with any medicine, there is a very remote chance of a vaccine causing a severe allergic reaction, other serious injury, or death.  5. What if there is a serious problem?  An allergic reaction could occur after the vaccinated person leaves the clinic. If you see signs of a severe allergic reaction (hives, swelling of the face and throat, difficulty breathing, a fast heartbeat, dizziness, or weakness),  call 9-1-1 and get the person to the nearest hospital.  For other signs that concern you, call your health care provider.  Adverse reactions should be reported to the Vaccine Adverse Event Reporting System (VAERS). Your health care provider will usually file this report, or you can do it yourself. Visit the VAERS website at www.vaers.[facility].gov or call 1-881.176.2757.VAERS is only for reporting reactions, and VAERS staff do not give medical advice.  6. The National Vaccine Injury Compensation Program  The National Vaccine Injury Compensation Program (VICP) is a federal program that was created to compensate people who may have been injured by certain vaccines. Visit the VICP website at www.[facility].gov/vaccinecompensation or call 1-281.951.9802 to learn about the program and about filing a claim. There is a time limit to file a claim for compensation.  7. How can I learn more?  · Ask your healthcare provider.  · Call your local or state health department.  · Contact the Centers for Disease Control and Prevention (CDC):  ? Call 1-403.597.2295 (6-917-TJI-INFO) or  ? Visit CDC's www.cdc.gov/flu    Vaccine Information Statement (Interim) Inactivated Influenza Vaccine (8/15/2019)  This information is not intended to replace advice given to you by your health care provider. Make sure you discuss any questions you have with your health care provider.  Document Released: 10/12/2007 Document Revised: 04/07/2020 Document Reviewed: 08/19/2019  [employer]vier Patient Education © 2020 [employer]

## 2020-09-16 ENCOUNTER — ROUTINE PRENATAL (OUTPATIENT)
Dept: OBSTETRICS AND GYNECOLOGY | Facility: CLINIC | Age: 30
End: 2020-09-16

## 2020-09-16 VITALS — BODY MASS INDEX: 35.56 KG/M2 | WEIGHT: 217 LBS | SYSTOLIC BLOOD PRESSURE: 116 MMHG | DIASTOLIC BLOOD PRESSURE: 72 MMHG

## 2020-09-16 DIAGNOSIS — O99.019 MATERNAL ANEMIA IN PREGNANCY, ANTEPARTUM: ICD-10-CM

## 2020-09-16 DIAGNOSIS — Z34.03 ENCOUNTER FOR SUPERVISION OF NORMAL FIRST PREGNANCY IN THIRD TRIMESTER: Primary | ICD-10-CM

## 2020-09-16 PROCEDURE — 0502F SUBSEQUENT PRENATAL CARE: CPT | Performed by: OBSTETRICS & GYNECOLOGY

## 2020-09-16 NOTE — PROGRESS NOTES
Chief Complaint   Patient presents with   • Routine Prenatal Visit       HPI: Izzy is a  currently at 30w2d who today reports the following:  Contractions - No; Leaking - No; Vaginal bleeding -  No; Swelling of extremities - No.    ROS:  GI: Nausea - No; Constipation - No; Diarrhea - No    Neuro: Headache - No; Visual change - No    Respiratory: Cough - No; SOB - No; fever - No     EXAM:  Vitals: See prenatal flowsheet   Abdomen: See prenatal flowsheet   Urine glucose/protein: See prenatal flowsheet   Pelvic: See prenatal flowsheet   MDM:   Impression: 1. Supervision of high risk pregnancy  2. Anemia in pregnancy   Tests done today: 1. HgB   Topics discussed: 1. Continue with PNV's  2. Prenatal labs reviewed  3. Questions answered regarding COVID-19 and revision of office schedule of visits due to pandemic  4. No additional counseling given - she has no specific complaints or concerns   Tests scheduled today for her next visit:   none

## 2020-09-21 ENCOUNTER — LAB (OUTPATIENT)
Dept: LAB | Facility: HOSPITAL | Age: 30
End: 2020-09-21

## 2020-09-21 LAB
HCT VFR BLD AUTO: 33.2 % (ref 34–46.6)
HGB BLD-MCNC: 11.4 G/DL (ref 12–15.9)

## 2020-09-21 PROCEDURE — 85018 HEMOGLOBIN: CPT | Performed by: OBSTETRICS & GYNECOLOGY

## 2020-09-21 PROCEDURE — 85014 HEMATOCRIT: CPT | Performed by: OBSTETRICS & GYNECOLOGY

## 2020-09-21 PROCEDURE — 36415 COLL VENOUS BLD VENIPUNCTURE: CPT | Performed by: OBSTETRICS & GYNECOLOGY

## 2020-10-01 ENCOUNTER — ROUTINE PRENATAL (OUTPATIENT)
Dept: OBSTETRICS AND GYNECOLOGY | Facility: CLINIC | Age: 30
End: 2020-10-01

## 2020-10-01 VITALS — SYSTOLIC BLOOD PRESSURE: 118 MMHG | BODY MASS INDEX: 36.22 KG/M2 | WEIGHT: 221 LBS | DIASTOLIC BLOOD PRESSURE: 70 MMHG

## 2020-10-01 DIAGNOSIS — Z34.03 ENCOUNTER FOR SUPERVISION OF NORMAL FIRST PREGNANCY IN THIRD TRIMESTER: Primary | ICD-10-CM

## 2020-10-01 DIAGNOSIS — O26.893 HEARTBURN DURING PREGNANCY, THIRD TRIMESTER: ICD-10-CM

## 2020-10-01 DIAGNOSIS — R12 HEARTBURN DURING PREGNANCY, THIRD TRIMESTER: ICD-10-CM

## 2020-10-01 PROCEDURE — 0502F SUBSEQUENT PRENATAL CARE: CPT | Performed by: OBSTETRICS & GYNECOLOGY

## 2020-10-01 NOTE — PROGRESS NOTES
Chief Complaint   Patient presents with   • Routine Prenatal Visit       HPI: Izzy is a  currently at 32w3d who today reports the following:  Contractions - No; Leaking - No; Vaginal bleeding -  No; Swelling of extremities - No.    ROS:  GI: GERD - YES; Constipation - No; Diarrhea - No    Neuro: Headache - No; Visual change - No    Respiratory: Cough - No; SOB - No; fever - No     EXAM:  Vitals: See prenatal flowsheet   Abdomen: See prenatal flowsheet   Urine glucose/protein: See prenatal flowsheet   Pelvic: See prenatal flowsheet   MDM:   Impression: 1. Supervision of low risk pregnancy  2. Anemia in pregnancy  3. GERD in pregnancy   Tests done today: 1. none   Topics discussed: 1. Continue with PNV's  2. Prenatal labs reviewed  3. Questions answered regarding COVID-19 and revision of office schedule of visits due to pandemic  4. OTC medical options for her GERD   Tests scheduled today for her next visit:   none

## 2020-10-12 ENCOUNTER — HOSPITAL ENCOUNTER (OUTPATIENT)
Dept: WOMENS IMAGING | Facility: HOSPITAL | Age: 30
Discharge: HOME | End: 2020-10-12
Admitting: OBSTETRICS & GYNECOLOGY

## 2020-10-12 ENCOUNTER — OFFICE VISIT (OUTPATIENT)
Dept: OBSTETRICS AND GYNECOLOGY | Facility: HOSPITAL | Age: 30
End: 2020-10-12

## 2020-10-12 VITALS — DIASTOLIC BLOOD PRESSURE: 83 MMHG | BODY MASS INDEX: 36.77 KG/M2 | SYSTOLIC BLOOD PRESSURE: 127 MMHG | WEIGHT: 224.4 LBS

## 2020-10-12 DIAGNOSIS — Z36.83 ENCOUNTER FOR FETAL SCREENING FOR CONGENITAL CARDIAC ABNORMALITIES: Primary | ICD-10-CM

## 2020-10-12 DIAGNOSIS — Z34.90 PREGNANCY, UNSPECIFIED GESTATIONAL AGE: ICD-10-CM

## 2020-10-12 DIAGNOSIS — Z03.73 FETAL ANOMALY SUSPECTED BUT NOT FOUND: ICD-10-CM

## 2020-10-12 DIAGNOSIS — Z36.83 ENCOUNTER FOR FETAL SCREENING FOR CONGENITAL CARDIAC ABNORMALITIES: ICD-10-CM

## 2020-10-12 PROCEDURE — 76816 OB US FOLLOW-UP PER FETUS: CPT | Performed by: OBSTETRICS & GYNECOLOGY

## 2020-10-12 PROCEDURE — 99212 OFFICE O/P EST SF 10 MIN: CPT | Performed by: OBSTETRICS & GYNECOLOGY

## 2020-10-12 PROCEDURE — 76816 OB US FOLLOW-UP PER FETUS: CPT

## 2020-10-12 NOTE — PROGRESS NOTES
Patient seen in Maternal Fetal Medicine clinic today. Please see full note in under imaging tab of patient chart in Epic (Viewpoint report).    Augustina Sharpe MD

## 2020-10-14 ENCOUNTER — ROUTINE PRENATAL (OUTPATIENT)
Dept: OBSTETRICS AND GYNECOLOGY | Facility: CLINIC | Age: 30
End: 2020-10-14

## 2020-10-14 VITALS — SYSTOLIC BLOOD PRESSURE: 128 MMHG | DIASTOLIC BLOOD PRESSURE: 64 MMHG | BODY MASS INDEX: 36.71 KG/M2 | WEIGHT: 224 LBS

## 2020-10-14 DIAGNOSIS — R12 HEARTBURN DURING PREGNANCY, THIRD TRIMESTER: ICD-10-CM

## 2020-10-14 DIAGNOSIS — Z34.03 ENCOUNTER FOR SUPERVISION OF NORMAL FIRST PREGNANCY IN THIRD TRIMESTER: Primary | ICD-10-CM

## 2020-10-14 DIAGNOSIS — O26.893 HEARTBURN DURING PREGNANCY, THIRD TRIMESTER: ICD-10-CM

## 2020-10-14 PROBLEM — Z36.83 ENCOUNTER FOR FETAL SCREENING FOR CONGENITAL CARDIAC ABNORMALITIES: Status: RESOLVED | Noted: 2020-07-09 | Resolved: 2020-10-14

## 2020-10-14 PROBLEM — O99.019 MATERNAL ANEMIA IN PREGNANCY, ANTEPARTUM: Status: RESOLVED | Noted: 2020-08-17 | Resolved: 2020-10-14

## 2020-10-14 PROCEDURE — 0502F SUBSEQUENT PRENATAL CARE: CPT | Performed by: OBSTETRICS & GYNECOLOGY

## 2020-10-14 NOTE — PROGRESS NOTES
Chief Complaint   Patient presents with   • Routine Prenatal Visit     ob visit       HPI: Izzy is a  currently at 34w2d who today reports the following:  Contractions - No; Leaking - No; Vaginal bleeding -  No; Swelling of extremities - No.    ROS:  GI: Nausea - No; Constipation - No; Diarrhea - No    Neuro: Headache - No; Visual change - No    Respiratory: Cough - No; SOB - No; fever - No     EXAM:  Vitals: See prenatal flowsheet   Abdomen: See prenatal flowsheet   Urine glucose/protein: See prenatal flowsheet   Pelvic: See prenatal flowsheet   MDM:   Impression: 1. Supervision of low risk pregnancy  2. Anemia in pregnancy   Tests done today: 1. none   Topics discussed: 1. Continue with PNV's  2. Prenatal labs reviewed  3. No additional counseling given - she has no specific complaints or concerns   Tests scheduled today for her next visit:   none

## 2020-10-19 ENCOUNTER — TELEPHONE (OUTPATIENT)
Dept: OBSTETRICS AND GYNECOLOGY | Facility: CLINIC | Age: 30
End: 2020-10-19

## 2020-10-19 ENCOUNTER — ROUTINE PRENATAL (OUTPATIENT)
Dept: OBSTETRICS AND GYNECOLOGY | Facility: CLINIC | Age: 30
End: 2020-10-19

## 2020-10-19 VITALS — DIASTOLIC BLOOD PRESSURE: 80 MMHG | SYSTOLIC BLOOD PRESSURE: 132 MMHG | WEIGHT: 225 LBS | BODY MASS INDEX: 36.87 KG/M2

## 2020-10-19 DIAGNOSIS — Z34.03 ENCOUNTER FOR SUPERVISION OF NORMAL FIRST PREGNANCY IN THIRD TRIMESTER: Primary | ICD-10-CM

## 2020-10-19 DIAGNOSIS — O12.03 EDEMA IN PREGNANCY IN THIRD TRIMESTER: ICD-10-CM

## 2020-10-19 LAB
GLUCOSE UR STRIP-MCNC: NEGATIVE MG/DL
PROT UR STRIP-MCNC: ABNORMAL MG/DL

## 2020-10-19 PROCEDURE — 0502F SUBSEQUENT PRENATAL CARE: CPT | Performed by: OBSTETRICS & GYNECOLOGY

## 2020-10-19 NOTE — PROGRESS NOTES
Chief Complaint   Patient presents with   • Pregnancy Problem       HPI: Izzy is a  currently at 35w0d who today reports the following:  Contractions - No; Leaking - No; Vaginal bleeding -  No; Swelling of extremities - YES.    ROS:  GI: Nausea - No; Constipation - No; Diarrhea - No    Neuro: Headache - No; Visual change - No    Respiratory: Cough - No; SOB - No; fever - No     EXAM:  Vitals: See prenatal flowsheet   Abdomen: See prenatal flowsheet   Urine glucose/protein: See prenatal flowsheet   Pelvic: See prenatal flowsheet   MDM:   Impression: 1. Supervision of high risk pregnancy  2. Benign edema in pregnancy   Tests done today: 1. none   Topics discussed: 1. Continue with PNV's  2. Prenatal labs reviewed  3. Symptoms of preeclampsia   Tests scheduled today for her next visit:   none

## 2020-10-19 NOTE — TELEPHONE ENCOUNTER
PATIENT CALLED AT 8:35 THIS MORNING. HER CONCERNS WERE THAT SHE TOOK HER BLOOD PRESSURE AND IT /96 TODAY. SHE STATES SHE IS SWELLING IN FACE AND HANDS. PLEASE CALL PT.     THANK YOU

## 2020-10-29 ENCOUNTER — HOSPITAL ENCOUNTER (OUTPATIENT)
Facility: HOSPITAL | Age: 30
Setting detail: OBSERVATION
Discharge: HOME | End: 2020-10-29
Attending: OBSTETRICS & GYNECOLOGY | Admitting: OBSTETRICS & GYNECOLOGY

## 2020-10-29 ENCOUNTER — ROUTINE PRENATAL (OUTPATIENT)
Dept: OBSTETRICS AND GYNECOLOGY | Facility: CLINIC | Age: 30
End: 2020-10-29

## 2020-10-29 VITALS
WEIGHT: 231 LBS | RESPIRATION RATE: 16 BRPM | TEMPERATURE: 98.9 F | HEIGHT: 66 IN | BODY MASS INDEX: 37.12 KG/M2 | DIASTOLIC BLOOD PRESSURE: 92 MMHG | SYSTOLIC BLOOD PRESSURE: 137 MMHG | HEART RATE: 82 BPM

## 2020-10-29 VITALS — SYSTOLIC BLOOD PRESSURE: 160 MMHG | DIASTOLIC BLOOD PRESSURE: 78 MMHG | WEIGHT: 231 LBS | BODY MASS INDEX: 37.86 KG/M2

## 2020-10-29 DIAGNOSIS — O13.3 GESTATIONAL HYPERTENSION, THIRD TRIMESTER: ICD-10-CM

## 2020-10-29 DIAGNOSIS — O12.03 EDEMA IN PREGNANCY IN THIRD TRIMESTER: ICD-10-CM

## 2020-10-29 DIAGNOSIS — Z34.03 ENCOUNTER FOR SUPERVISION OF NORMAL FIRST PREGNANCY IN THIRD TRIMESTER: Primary | ICD-10-CM

## 2020-10-29 LAB
ALBUMIN SERPL-MCNC: 3.7 G/DL (ref 3.5–5.2)
ALBUMIN/GLOB SERPL: 1.6 G/DL
ALP SERPL-CCNC: 180 U/L (ref 39–117)
ALT SERPL W P-5'-P-CCNC: 12 U/L (ref 1–33)
ANION GAP SERPL CALCULATED.3IONS-SCNC: 11 MMOL/L (ref 5–15)
AST SERPL-CCNC: 17 U/L (ref 1–32)
BILIRUB SERPL-MCNC: 0.2 MG/DL (ref 0–1.2)
BUN SERPL-MCNC: 8 MG/DL (ref 6–20)
BUN/CREAT SERPL: 10.5 (ref 7–25)
CALCIUM SPEC-SCNC: 9.5 MG/DL (ref 8.6–10.5)
CHLORIDE SERPL-SCNC: 104 MMOL/L (ref 98–107)
CO2 SERPL-SCNC: 21 MMOL/L (ref 22–29)
CREAT SERPL-MCNC: 0.76 MG/DL (ref 0.57–1)
DEPRECATED RDW RBC AUTO: 43.1 FL (ref 37–54)
ERYTHROCYTE [DISTWIDTH] IN BLOOD BY AUTOMATED COUNT: 12.6 % (ref 12.3–15.4)
GFR SERPL CREATININE-BSD FRML MDRD: 90 ML/MIN/1.73
GLOBULIN UR ELPH-MCNC: 2.3 GM/DL
GLUCOSE SERPL-MCNC: 94 MG/DL (ref 65–99)
GLUCOSE UR STRIP-MCNC: NEGATIVE MG/DL
HCT VFR BLD AUTO: 34.2 % (ref 34–46.6)
HGB BLD-MCNC: 11.2 G/DL (ref 12–15.9)
MCH RBC QN AUTO: 30.5 PG (ref 26.6–33)
MCHC RBC AUTO-ENTMCNC: 32.7 G/DL (ref 31.5–35.7)
MCV RBC AUTO: 93.2 FL (ref 79–97)
PLATELET # BLD AUTO: 157 10*3/MM3 (ref 140–450)
PMV BLD AUTO: 13 FL (ref 6–12)
POTASSIUM SERPL-SCNC: 4.1 MMOL/L (ref 3.5–5.2)
PROT SERPL-MCNC: 6 G/DL (ref 6–8.5)
PROT UR STRIP-MCNC: ABNORMAL MG/DL
RBC # BLD AUTO: 3.67 10*6/MM3 (ref 3.77–5.28)
SODIUM SERPL-SCNC: 136 MMOL/L (ref 136–145)
WBC # BLD AUTO: 8.84 10*3/MM3 (ref 3.4–10.8)

## 2020-10-29 PROCEDURE — 59025 FETAL NON-STRESS TEST: CPT

## 2020-10-29 PROCEDURE — 0502F SUBSEQUENT PRENATAL CARE: CPT | Performed by: OBSTETRICS & GYNECOLOGY

## 2020-10-29 PROCEDURE — G0378 HOSPITAL OBSERVATION PER HR: HCPCS

## 2020-10-29 PROCEDURE — 99219 PR INITIAL OBSERVATION CARE/DAY 50 MINUTES: CPT | Performed by: OBSTETRICS & GYNECOLOGY

## 2020-10-29 PROCEDURE — 85027 COMPLETE CBC AUTOMATED: CPT | Performed by: OBSTETRICS & GYNECOLOGY

## 2020-10-29 PROCEDURE — 80053 COMPREHEN METABOLIC PANEL: CPT | Performed by: OBSTETRICS & GYNECOLOGY

## 2020-10-29 NOTE — PROGRESS NOTES
Chief Complaint   Patient presents with   • Routine Prenatal Visit       HPI: Izzy is a  currently at 36w3d who today reports the following:  Contractions - No; Leaking - No; Vaginal bleeding -  No; Swelling of extremities - Unchanged from the prior visit.    ROS:  GI: Nausea - No; Constipation - No; Diarrhea - No    Neuro: Headache -she has chronic headaches but has noticed no change.  Currently no headache; Visual change - No    Respiratory: Cough - No; SOB - No; fever - No     EXAM:  Vitals: See prenatal flowsheet   Abdomen: See prenatal flowsheet   Urine glucose/protein: See prenatal flowsheet   Pelvic: See prenatal flowsheet   MDM:   Impression: 1. Supervision of high risk pregnancy   2. Gestational hypertension  3. Favorable cervix   Tests done today: 1. GBS testing   Topics discussed: 1. Continue with PNV's  2. Prenatal labs reviewed  3. No additional counseling given - she has no specific complaints or concerns   Tests scheduled today for her next visit:   Needs to go to labor and delivery for evaluation.  Charge nurse notified and orders have been placed

## 2020-10-30 ENCOUNTER — APPOINTMENT (OUTPATIENT)
Dept: PREADMISSION TESTING | Facility: HOSPITAL | Age: 30
End: 2020-10-30

## 2020-10-30 LAB — SARS-COV-2 RNA RESP QL NAA+PROBE: NOT DETECTED

## 2020-10-30 PROCEDURE — U0004 COV-19 TEST NON-CDC HGH THRU: HCPCS

## 2020-10-30 PROCEDURE — C9803 HOPD COVID-19 SPEC COLLECT: HCPCS

## 2020-10-31 ENCOUNTER — PREP FOR SURGERY (OUTPATIENT)
Dept: OTHER | Facility: HOSPITAL | Age: 30
End: 2020-10-31

## 2020-10-31 DIAGNOSIS — O13.3 GESTATIONAL HYPERTENSION, THIRD TRIMESTER: Primary | ICD-10-CM

## 2020-10-31 RX ORDER — OXYTOCIN-SODIUM CHLORIDE 0.9% IV SOLN 30 UNIT/500ML 30-0.9/5 UT/ML-%
650 SOLUTION INTRAVENOUS ONCE
Status: CANCELLED | OUTPATIENT
Start: 2020-10-31 | End: 2020-10-31

## 2020-10-31 RX ORDER — PROMETHAZINE HYDROCHLORIDE 12.5 MG/1
12.5 SUPPOSITORY RECTAL EVERY 6 HOURS PRN
Status: CANCELLED | OUTPATIENT
Start: 2020-10-31

## 2020-10-31 RX ORDER — OXYCODONE HYDROCHLORIDE AND ACETAMINOPHEN 5; 325 MG/1; MG/1
2 TABLET ORAL EVERY 4 HOURS PRN
Status: CANCELLED | OUTPATIENT
Start: 2020-10-31 | End: 2020-11-10

## 2020-10-31 RX ORDER — METHYLERGONOVINE MALEATE 0.2 MG/ML
200 INJECTION INTRAVENOUS
Status: CANCELLED | OUTPATIENT
Start: 2020-10-31 | End: 2020-11-01

## 2020-10-31 RX ORDER — MAGNESIUM CARB/ALUMINUM HYDROX 105-160MG
30 TABLET,CHEWABLE ORAL ONCE
Status: CANCELLED | OUTPATIENT
Start: 2020-10-31 | End: 2020-10-31

## 2020-10-31 RX ORDER — PROMETHAZINE HYDROCHLORIDE 12.5 MG/1
12.5 TABLET ORAL EVERY 6 HOURS PRN
Status: CANCELLED | OUTPATIENT
Start: 2020-10-31

## 2020-10-31 RX ORDER — SODIUM CHLORIDE 0.9 % (FLUSH) 0.9 %
1-10 SYRINGE (ML) INJECTION AS NEEDED
Status: CANCELLED | OUTPATIENT
Start: 2020-10-31

## 2020-10-31 RX ORDER — MISOPROSTOL 200 UG/1
800 TABLET ORAL ONCE AS NEEDED
Status: CANCELLED | OUTPATIENT
Start: 2020-10-31

## 2020-10-31 RX ORDER — IBUPROFEN 600 MG/1
600 TABLET ORAL EVERY 6 HOURS PRN
Status: CANCELLED | OUTPATIENT
Start: 2020-10-31

## 2020-10-31 RX ORDER — SODIUM CHLORIDE, SODIUM LACTATE, POTASSIUM CHLORIDE, CALCIUM CHLORIDE 600; 310; 30; 20 MG/100ML; MG/100ML; MG/100ML; MG/100ML
125 INJECTION, SOLUTION INTRAVENOUS CONTINUOUS
Status: CANCELLED | OUTPATIENT
Start: 2020-10-31

## 2020-10-31 RX ORDER — BUTORPHANOL TARTRATE 1 MG/ML
1 INJECTION, SOLUTION INTRAMUSCULAR; INTRAVENOUS
Status: CANCELLED | OUTPATIENT
Start: 2020-10-31

## 2020-10-31 RX ORDER — SODIUM CHLORIDE 0.9 % (FLUSH) 0.9 %
3 SYRINGE (ML) INJECTION EVERY 12 HOURS SCHEDULED
Status: CANCELLED | OUTPATIENT
Start: 2020-10-31

## 2020-10-31 RX ORDER — CARBOPROST TROMETHAMINE 250 UG/ML
250 INJECTION, SOLUTION INTRAMUSCULAR
Status: CANCELLED | OUTPATIENT
Start: 2020-10-31 | End: 2020-11-01

## 2020-10-31 RX ORDER — LIDOCAINE HYDROCHLORIDE 10 MG/ML
5 INJECTION, SOLUTION EPIDURAL; INFILTRATION; INTRACAUDAL; PERINEURAL AS NEEDED
Status: CANCELLED | OUTPATIENT
Start: 2020-10-31

## 2020-10-31 RX ORDER — OXYTOCIN-SODIUM CHLORIDE 0.9% IV SOLN 30 UNIT/500ML 30-0.9/5 UT/ML-%
2-24 SOLUTION INTRAVENOUS
Status: CANCELLED | OUTPATIENT
Start: 2020-10-31 | End: 2020-11-03

## 2020-10-31 RX ORDER — OXYTOCIN-SODIUM CHLORIDE 0.9% IV SOLN 30 UNIT/500ML 30-0.9/5 UT/ML-%
85 SOLUTION INTRAVENOUS ONCE
Status: CANCELLED | OUTPATIENT
Start: 2020-10-31 | End: 2020-10-31

## 2020-10-31 NOTE — H&P
Izzy Hernandez  : 1990  MRN: [id number]  CSN: [id number]    History and Physical    Subjective     Izzy Hernandez is a 30 y.o. year old  with an Estimated Date of Delivery: 20 presenting for induction of labor for pre-eclampsia.  Fetal EFW is AGA and pelvis is clinically adequate.    Risks of labor induction including prolongation of labor, increased risks for both  section and operative vaginal birth have been discussed at length.     Prenatal care has been with  Dr. Wen.  It has been otherwise benign.    OB History    Para Term  AB Living   1 0 0 0 0 0   SAB TAB Ectopic Molar Multiple Live Births   0 0 0 0 0 0      # Outcome Date GA Lbr Akira/2nd Weight Sex Delivery Anes PTL Lv   1 Current               Obstetric Comments   FOB #1 : Pregnancy #1 (IUI per Dr. 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      Past Surgical History:   Procedure Laterality Date   • WISDOM TOOTH EXTRACTION     • SKIN CANCER EXCISION  2018    MIS on back       Current Outpatient Medications:   •  doxylamine (UNISOM) 25 MG tablet, Take 25 mg by mouth At Night As Needed for Sleep., Disp: , Rfl:   •  Famotidine (PEPCID AC PO), Take 1 each by mouth At Night As Needed., Disp: , Rfl:   •  ferrous sulfate 325 (65 FE) MG tablet, Take 1 tablet by mouth 2 (Two) Times a Day Before Meals for 150 days., Disp: 60 tablet, Rfl: 4  •  Prenatal Vit-Fe Fumarate-FA (PRENATAL VITAMIN PO), Take 1 tablet by mouth Daily., Disp: , Rfl:     No Known Allergies  Social History    Tobacco Use      Smoking status: Never Smoker      Smokeless tobacco: Never Used    Review of Systems      Objective   LMP 2020     General: well developed; well nourished  no acute distress   Abdomen: soft, non-tender; no masses  no umbilical or inguinal hernias are present  no  hepato-splenomegaly   Cervix: At last prenatal visit - 4 cm / 80 % / -1 / soft / anterior   Presentation: At last prenatal visit - cephalic     Prenatal Labs  Lab Results   Component Value Date    HGB 11.2 (L) 10/29/2020    RUBELLAABIGG Positive 04/22/2020    HEPBSAG Non-Reactive 04/22/2020    ABSCRN Negative 04/22/2020    EZG0PFV1 Non-Reactive 04/22/2020    HEPCVIRUSABY Non-Reactive 04/22/2020     08/17/2020    URINECX <10,000 CFU/mL Mixed Nicole Isolated 04/22/2020    CHLAMNAA Negative 05/13/2020    NGONORRHON Negative 05/13/2020            Assessment   1. IUP with an Estimated Date of Delivery: 11/23/20  2. Induction of labor because of pre-eclampsia  3. Group B strep status: unknown     Plan   1. Pitocin induction  2. Manage GBS by risk factor strategy    Isaias Wen MD

## 2020-11-02 ENCOUNTER — ANESTHESIA (OUTPATIENT)
Dept: LABOR AND DELIVERY | Facility: HOSPITAL | Age: 30
End: 2020-11-02

## 2020-11-02 ENCOUNTER — HOSPITAL ENCOUNTER (OUTPATIENT)
Dept: LABOR AND DELIVERY | Facility: HOSPITAL | Age: 30
Discharge: HOME | End: 2020-11-02

## 2020-11-02 ENCOUNTER — HOSPITAL ENCOUNTER (INPATIENT)
Facility: HOSPITAL | Age: 30
LOS: 2 days | Discharge: HOME | End: 2020-11-04
Attending: OBSTETRICS & GYNECOLOGY | Admitting: OBSTETRICS & GYNECOLOGY

## 2020-11-02 ENCOUNTER — ANESTHESIA EVENT (OUTPATIENT)
Dept: LABOR AND DELIVERY | Facility: HOSPITAL | Age: 30
End: 2020-11-02

## 2020-11-02 DIAGNOSIS — O13.3 GESTATIONAL HYPERTENSION, THIRD TRIMESTER: ICD-10-CM

## 2020-11-02 PROBLEM — O12.03 EDEMA IN PREGNANCY IN THIRD TRIMESTER: Status: RESOLVED | Noted: 2020-10-19 | Resolved: 2020-11-02

## 2020-11-02 PROBLEM — R12 HEARTBURN DURING PREGNANCY, THIRD TRIMESTER: Status: RESOLVED | Noted: 2020-08-17 | Resolved: 2020-11-02

## 2020-11-02 PROBLEM — O26.893 HEARTBURN DURING PREGNANCY, THIRD TRIMESTER: Status: RESOLVED | Noted: 2020-08-17 | Resolved: 2020-11-02

## 2020-11-02 PROBLEM — O14.90 PREECLAMPSIA: Status: ACTIVE | Noted: 2020-11-02

## 2020-11-02 PROBLEM — Z34.03 ENCOUNTER FOR SUPERVISION OF NORMAL FIRST PREGNANCY IN THIRD TRIMESTER: Status: RESOLVED | Noted: 2020-04-17 | Resolved: 2020-11-02

## 2020-11-02 LAB
ABO GROUP BLD: NORMAL
ALP SERPL-CCNC: 186 U/L (ref 39–117)
ALT SERPL W P-5'-P-CCNC: 12 U/L (ref 1–33)
AST SERPL-CCNC: 20 U/L (ref 1–32)
BILIRUB SERPL-MCNC: 0.2 MG/DL (ref 0–1.2)
BLD GP AB SCN SERPL QL: NEGATIVE
CREAT SERPL-MCNC: 0.73 MG/DL (ref 0.57–1)
DEPRECATED RDW RBC AUTO: 42.9 FL (ref 37–54)
ERYTHROCYTE [DISTWIDTH] IN BLOOD BY AUTOMATED COUNT: 12.8 % (ref 12.3–15.4)
HCT VFR BLD AUTO: 35.1 % (ref 34–46.6)
HGB BLD-MCNC: 11.7 G/DL (ref 12–15.9)
LDH SERPL-CCNC: 237 U/L (ref 135–214)
MCH RBC QN AUTO: 30.9 PG (ref 26.6–33)
MCHC RBC AUTO-ENTMCNC: 33.3 G/DL (ref 31.5–35.7)
MCV RBC AUTO: 92.6 FL (ref 79–97)
PLATELET # BLD AUTO: 170 10*3/MM3 (ref 140–450)
PMV BLD AUTO: 13 FL (ref 6–12)
RBC # BLD AUTO: 3.79 10*6/MM3 (ref 3.77–5.28)
RH BLD: POSITIVE
T&S EXPIRATION DATE: NORMAL
URATE SERPL-MCNC: 5.7 MG/DL (ref 2.4–5.7)
WBC # BLD AUTO: 9.27 10*3/MM3 (ref 3.4–10.8)

## 2020-11-02 PROCEDURE — 84550 ASSAY OF BLOOD/URIC ACID: CPT | Performed by: OBSTETRICS & GYNECOLOGY

## 2020-11-02 PROCEDURE — 25010000002 FENTANYL CITRATE (PF) 100 MCG/2ML SOLUTION: Performed by: NURSE ANESTHETIST, CERTIFIED REGISTERED

## 2020-11-02 PROCEDURE — 84075 ASSAY ALKALINE PHOSPHATASE: CPT | Performed by: OBSTETRICS & GYNECOLOGY

## 2020-11-02 PROCEDURE — 86850 RBC ANTIBODY SCREEN: CPT | Performed by: OBSTETRICS & GYNECOLOGY

## 2020-11-02 PROCEDURE — 82565 ASSAY OF CREATININE: CPT | Performed by: OBSTETRICS & GYNECOLOGY

## 2020-11-02 PROCEDURE — 3E033VJ INTRODUCTION OF OTHER HORMONE INTO PERIPHERAL VEIN, PERCUTANEOUS APPROACH: ICD-10-PCS | Performed by: OBSTETRICS & GYNECOLOGY

## 2020-11-02 PROCEDURE — C1755 CATHETER, INTRASPINAL: HCPCS

## 2020-11-02 PROCEDURE — 84460 ALANINE AMINO (ALT) (SGPT): CPT | Performed by: OBSTETRICS & GYNECOLOGY

## 2020-11-02 PROCEDURE — 25010000002 ROPIVACAINE PER 1 MG: Performed by: NURSE ANESTHETIST, CERTIFIED REGISTERED

## 2020-11-02 PROCEDURE — 82247 BILIRUBIN TOTAL: CPT | Performed by: OBSTETRICS & GYNECOLOGY

## 2020-11-02 PROCEDURE — 84450 TRANSFERASE (AST) (SGOT): CPT | Performed by: OBSTETRICS & GYNECOLOGY

## 2020-11-02 PROCEDURE — 25010000002 ONDANSETRON PER 1 MG: Performed by: NURSE ANESTHETIST, CERTIFIED REGISTERED

## 2020-11-02 PROCEDURE — 59025 FETAL NON-STRESS TEST: CPT

## 2020-11-02 PROCEDURE — 51703 INSERT BLADDER CATH COMPLEX: CPT

## 2020-11-02 PROCEDURE — C1755 CATHETER, INTRASPINAL: HCPCS | Performed by: ANESTHESIOLOGY

## 2020-11-02 PROCEDURE — 86901 BLOOD TYPING SEROLOGIC RH(D): CPT | Performed by: OBSTETRICS & GYNECOLOGY

## 2020-11-02 PROCEDURE — 83615 LACTATE (LD) (LDH) ENZYME: CPT | Performed by: OBSTETRICS & GYNECOLOGY

## 2020-11-02 PROCEDURE — 85027 COMPLETE CBC AUTOMATED: CPT | Performed by: OBSTETRICS & GYNECOLOGY

## 2020-11-02 PROCEDURE — 86900 BLOOD TYPING SEROLOGIC ABO: CPT | Performed by: OBSTETRICS & GYNECOLOGY

## 2020-11-02 PROCEDURE — 0KQM0ZZ REPAIR PERINEUM MUSCLE, OPEN APPROACH: ICD-10-PCS | Performed by: OBSTETRICS & GYNECOLOGY

## 2020-11-02 PROCEDURE — 59400 OBSTETRICAL CARE: CPT | Performed by: OBSTETRICS & GYNECOLOGY

## 2020-11-02 RX ORDER — TRISODIUM CITRATE DIHYDRATE AND CITRIC ACID MONOHYDRATE 500; 334 MG/5ML; MG/5ML
30 SOLUTION ORAL ONCE
Status: DISCONTINUED | OUTPATIENT
Start: 2020-11-02 | End: 2020-11-02 | Stop reason: HOSPADM

## 2020-11-02 RX ORDER — LIDOCAINE HYDROCHLORIDE AND EPINEPHRINE 15; 5 MG/ML; UG/ML
INJECTION, SOLUTION EPIDURAL AS NEEDED
Status: DISCONTINUED | OUTPATIENT
Start: 2020-11-02 | End: 2020-11-02 | Stop reason: SURG

## 2020-11-02 RX ORDER — OXYTOCIN-SODIUM CHLORIDE 0.9% IV SOLN 30 UNIT/500ML 30-0.9/5 UT/ML-%
650 SOLUTION INTRAVENOUS ONCE
Status: COMPLETED | OUTPATIENT
Start: 2020-11-02 | End: 2020-11-02

## 2020-11-02 RX ORDER — OXYTOCIN-SODIUM CHLORIDE 0.9% IV SOLN 30 UNIT/500ML 30-0.9/5 UT/ML-%
85 SOLUTION INTRAVENOUS ONCE
Status: COMPLETED | OUTPATIENT
Start: 2020-11-02 | End: 2020-11-02

## 2020-11-02 RX ORDER — DOCUSATE SODIUM 100 MG/1
100 CAPSULE, LIQUID FILLED ORAL 2 TIMES DAILY
Status: DISCONTINUED | OUTPATIENT
Start: 2020-11-02 | End: 2020-11-04 | Stop reason: HOSPADM

## 2020-11-02 RX ORDER — MISOPROSTOL 200 UG/1
800 TABLET ORAL ONCE AS NEEDED
Status: DISCONTINUED | OUTPATIENT
Start: 2020-11-02 | End: 2020-11-02 | Stop reason: HOSPADM

## 2020-11-02 RX ORDER — HYDROCORTISONE 25 MG/G
1 CREAM TOPICAL AS NEEDED
Status: DISCONTINUED | OUTPATIENT
Start: 2020-11-02 | End: 2020-11-04 | Stop reason: HOSPADM

## 2020-11-02 RX ORDER — BUTORPHANOL TARTRATE 1 MG/ML
2 INJECTION, SOLUTION INTRAMUSCULAR; INTRAVENOUS
Status: DISCONTINUED | OUTPATIENT
Start: 2020-11-02 | End: 2020-11-02 | Stop reason: HOSPADM

## 2020-11-02 RX ORDER — PROMETHAZINE HYDROCHLORIDE 12.5 MG/1
12.5 SUPPOSITORY RECTAL EVERY 6 HOURS PRN
Status: DISCONTINUED | OUTPATIENT
Start: 2020-11-02 | End: 2020-11-02 | Stop reason: HOSPADM

## 2020-11-02 RX ORDER — SODIUM CHLORIDE 0.9 % (FLUSH) 0.9 %
1-10 SYRINGE (ML) INJECTION AS NEEDED
Status: DISCONTINUED | OUTPATIENT
Start: 2020-11-02 | End: 2020-11-02 | Stop reason: HOSPADM

## 2020-11-02 RX ORDER — OXYTOCIN-SODIUM CHLORIDE 0.9% IV SOLN 30 UNIT/500ML 30-0.9/5 UT/ML-%
2-24 SOLUTION INTRAVENOUS
Status: DISCONTINUED | OUTPATIENT
Start: 2020-11-02 | End: 2020-11-02 | Stop reason: HOSPADM

## 2020-11-02 RX ORDER — METHYLERGONOVINE MALEATE 0.2 MG/ML
200 INJECTION INTRAVENOUS
Status: DISCONTINUED | OUTPATIENT
Start: 2020-11-02 | End: 2020-11-02 | Stop reason: HOSPADM

## 2020-11-02 RX ORDER — CARBOPROST TROMETHAMINE 250 UG/ML
250 INJECTION, SOLUTION INTRAMUSCULAR
Status: DISCONTINUED | OUTPATIENT
Start: 2020-11-02 | End: 2020-11-02 | Stop reason: HOSPADM

## 2020-11-02 RX ORDER — SODIUM CHLORIDE 0.9 % (FLUSH) 0.9 %
3 SYRINGE (ML) INJECTION EVERY 12 HOURS SCHEDULED
Status: DISCONTINUED | OUTPATIENT
Start: 2020-11-02 | End: 2020-11-02 | Stop reason: HOSPADM

## 2020-11-02 RX ORDER — BISACODYL 10 MG
10 SUPPOSITORY, RECTAL RECTAL DAILY PRN
Status: DISCONTINUED | OUTPATIENT
Start: 2020-11-03 | End: 2020-11-04 | Stop reason: HOSPADM

## 2020-11-02 RX ORDER — BUTORPHANOL TARTRATE 1 MG/ML
1 INJECTION, SOLUTION INTRAMUSCULAR; INTRAVENOUS
Status: DISCONTINUED | OUTPATIENT
Start: 2020-11-02 | End: 2020-11-02 | Stop reason: HOSPADM

## 2020-11-02 RX ORDER — PROMETHAZINE HYDROCHLORIDE 12.5 MG/1
12.5 TABLET ORAL EVERY 6 HOURS PRN
Status: DISCONTINUED | OUTPATIENT
Start: 2020-11-02 | End: 2020-11-02 | Stop reason: HOSPADM

## 2020-11-02 RX ORDER — DIPHENHYDRAMINE HYDROCHLORIDE 50 MG/ML
12.5 INJECTION INTRAMUSCULAR; INTRAVENOUS EVERY 8 HOURS PRN
Status: DISCONTINUED | OUTPATIENT
Start: 2020-11-02 | End: 2020-11-02 | Stop reason: HOSPADM

## 2020-11-02 RX ORDER — ROPIVACAINE HYDROCHLORIDE 2 MG/ML
15 INJECTION, SOLUTION EPIDURAL; INFILTRATION; PERINEURAL CONTINUOUS
Status: DISCONTINUED | OUTPATIENT
Start: 2020-11-02 | End: 2020-11-02

## 2020-11-02 RX ORDER — EPHEDRINE SULFATE 5 MG/ML
10 INJECTION INTRAVENOUS
Status: DISCONTINUED | OUTPATIENT
Start: 2020-11-02 | End: 2020-11-02 | Stop reason: HOSPADM

## 2020-11-02 RX ORDER — IBUPROFEN 600 MG/1
600 TABLET ORAL EVERY 6 HOURS PRN
Status: DISCONTINUED | OUTPATIENT
Start: 2020-11-02 | End: 2020-11-04 | Stop reason: HOSPADM

## 2020-11-02 RX ORDER — IBUPROFEN 600 MG/1
600 TABLET ORAL EVERY 6 HOURS PRN
Status: DISCONTINUED | OUTPATIENT
Start: 2020-11-02 | End: 2020-11-02 | Stop reason: HOSPADM

## 2020-11-02 RX ORDER — LANOLIN
CREAM (ML) TOPICAL
Status: DISCONTINUED | OUTPATIENT
Start: 2020-11-02 | End: 2020-11-04 | Stop reason: HOSPADM

## 2020-11-02 RX ORDER — FAMOTIDINE 10 MG/ML
20 INJECTION, SOLUTION INTRAVENOUS ONCE AS NEEDED
Status: DISCONTINUED | OUTPATIENT
Start: 2020-11-02 | End: 2020-11-02 | Stop reason: HOSPADM

## 2020-11-02 RX ORDER — ONDANSETRON 2 MG/ML
4 INJECTION INTRAMUSCULAR; INTRAVENOUS ONCE AS NEEDED
Status: COMPLETED | OUTPATIENT
Start: 2020-11-02 | End: 2020-11-02

## 2020-11-02 RX ORDER — OXYCODONE HYDROCHLORIDE AND ACETAMINOPHEN 5; 325 MG/1; MG/1
1 TABLET ORAL EVERY 4 HOURS PRN
Status: DISCONTINUED | OUTPATIENT
Start: 2020-11-02 | End: 2020-11-04 | Stop reason: HOSPADM

## 2020-11-02 RX ORDER — FENTANYL CITRATE 50 UG/ML
INJECTION, SOLUTION INTRAMUSCULAR; INTRAVENOUS AS NEEDED
Status: DISCONTINUED | OUTPATIENT
Start: 2020-11-02 | End: 2020-11-02 | Stop reason: SURG

## 2020-11-02 RX ORDER — MAGNESIUM CARB/ALUMINUM HYDROX 105-160MG
30 TABLET,CHEWABLE ORAL ONCE
Status: DISCONTINUED | OUTPATIENT
Start: 2020-11-02 | End: 2020-11-02 | Stop reason: HOSPADM

## 2020-11-02 RX ORDER — LIDOCAINE HYDROCHLORIDE 10 MG/ML
5 INJECTION, SOLUTION EPIDURAL; INFILTRATION; INTRACAUDAL; PERINEURAL AS NEEDED
Status: DISCONTINUED | OUTPATIENT
Start: 2020-11-02 | End: 2020-11-02 | Stop reason: HOSPADM

## 2020-11-02 RX ORDER — METOCLOPRAMIDE HYDROCHLORIDE 5 MG/ML
10 INJECTION INTRAMUSCULAR; INTRAVENOUS ONCE AS NEEDED
Status: DISCONTINUED | OUTPATIENT
Start: 2020-11-02 | End: 2020-11-02 | Stop reason: HOSPADM

## 2020-11-02 RX ORDER — LIDOCAINE HYDROCHLORIDE AND EPINEPHRINE 20; 5 MG/ML; UG/ML
INJECTION, SOLUTION EPIDURAL; INFILTRATION; INTRACAUDAL; PERINEURAL AS NEEDED
Status: DISCONTINUED | OUTPATIENT
Start: 2020-11-02 | End: 2020-11-02 | Stop reason: SURG

## 2020-11-02 RX ORDER — SODIUM CHLORIDE, SODIUM LACTATE, POTASSIUM CHLORIDE, CALCIUM CHLORIDE 600; 310; 30; 20 MG/100ML; MG/100ML; MG/100ML; MG/100ML
125 INJECTION, SOLUTION INTRAVENOUS CONTINUOUS
Status: DISCONTINUED | OUTPATIENT
Start: 2020-11-02 | End: 2020-11-02

## 2020-11-02 RX ORDER — OXYCODONE HYDROCHLORIDE AND ACETAMINOPHEN 5; 325 MG/1; MG/1
2 TABLET ORAL EVERY 4 HOURS PRN
Status: DISCONTINUED | OUTPATIENT
Start: 2020-11-02 | End: 2020-11-02 | Stop reason: HOSPADM

## 2020-11-02 RX ADMIN — FENTANYL CITRATE 100 MCG: 50 INJECTION, SOLUTION INTRAMUSCULAR; INTRAVENOUS at 12:48

## 2020-11-02 RX ADMIN — IBUPROFEN 600 MG: 600 TABLET, FILM COATED ORAL at 16:06

## 2020-11-02 RX ADMIN — DOCUSATE SODIUM 100 MG: 100 CAPSULE ORAL at 20:11

## 2020-11-02 RX ADMIN — LIDOCAINE HYDROCHLORIDE AND EPINEPHRINE 2 ML: 15; 5 INJECTION, SOLUTION EPIDURAL at 12:48

## 2020-11-02 RX ADMIN — LIDOCAINE HYDROCHLORIDE AND EPINEPHRINE 3 ML: 15; 5 INJECTION, SOLUTION EPIDURAL at 12:45

## 2020-11-02 RX ADMIN — SODIUM CHLORIDE, POTASSIUM CHLORIDE, SODIUM LACTATE AND CALCIUM CHLORIDE 125 ML/HR: 600; 310; 30; 20 INJECTION, SOLUTION INTRAVENOUS at 12:42

## 2020-11-02 RX ADMIN — SODIUM CHLORIDE, POTASSIUM CHLORIDE, SODIUM LACTATE AND CALCIUM CHLORIDE 1000 ML: 600; 310; 30; 20 INJECTION, SOLUTION INTRAVENOUS at 12:23

## 2020-11-02 RX ADMIN — IBUPROFEN 600 MG: 600 TABLET, FILM COATED ORAL at 22:00

## 2020-11-02 RX ADMIN — OXYTOCIN 85 ML/HR: 10 INJECTION INTRAVENOUS at 16:50

## 2020-11-02 RX ADMIN — ROPIVACAINE HYDROCHLORIDE 15 ML/HR: 2 INJECTION, SOLUTION EPIDURAL; INFILTRATION at 12:53

## 2020-11-02 RX ADMIN — HYDROCORTISONE 2.5% 1 APPLICATION: 25 CREAM TOPICAL at 19:33

## 2020-11-02 RX ADMIN — Medication: at 19:34

## 2020-11-02 RX ADMIN — LIDOCAINE HYDROCHLORIDE,EPINEPHRINE BITARTRATE 5 ML: 20; .005 INJECTION, SOLUTION EPIDURAL; INFILTRATION; INTRACAUDAL; PERINEURAL at 13:22

## 2020-11-02 RX ADMIN — ROPIVACAINE HYDROCHLORIDE 10 ML: 5 INJECTION, SOLUTION EPIDURAL; INFILTRATION; PERINEURAL at 12:52

## 2020-11-02 RX ADMIN — ONDANSETRON 4 MG: 2 INJECTION INTRAMUSCULAR; INTRAVENOUS at 15:28

## 2020-11-02 RX ADMIN — WITCH HAZEL 1 PAD: 500 SOLUTION RECTAL; TOPICAL at 19:33

## 2020-11-02 RX ADMIN — SODIUM CHLORIDE, POTASSIUM CHLORIDE, SODIUM LACTATE AND CALCIUM CHLORIDE 125 ML/HR: 600; 310; 30; 20 INJECTION, SOLUTION INTRAVENOUS at 08:05

## 2020-11-02 RX ADMIN — OXYTOCIN 650 ML/HR: 10 INJECTION INTRAVENOUS at 15:43

## 2020-11-02 RX ADMIN — Medication: at 19:33

## 2020-11-02 RX ADMIN — OXYTOCIN 2 MILLI-UNITS/MIN: 10 INJECTION INTRAVENOUS at 08:15

## 2020-11-02 NOTE — PROGRESS NOTES
MARGARET Yvette Mercado [place]  : 1990  MRN: [id number]  CSN: [id number]    Labor progress note    Subjective     CC: hospital follow-up    She has been pushing about an hour and a half now.  Is starting to get tired but wants to still push for a while longer.     Objective   Min/max vitals past 24 hours:  Temp  Min: 98.1 °F (36.7 °C)  Max: 98.2 °F (36.8 °C)   BP  Min: 116/70  Max: 172/107   Pulse  Min: 77  Max: 104   Resp  Min: 16  Max: 16        FHT's: reassuring and category 2.  Still with good variability and accelerations   Cervix: was checked (by me): 10 cm / 100 % / +2 right occiput anterior   Contractions: regular        Assessment   1. IUP at 37w0d  2. Fetal status reassuring   3. Induction of labor for gestational hypertension at term     Plan   1. Continue to push    Isaias Wen MD  2020  15:14 EST

## 2020-11-02 NOTE — ANESTHESIA PROCEDURE NOTES
Labor Epidural      Patient reassessed immediately prior to procedure    Patient location during procedure: floor  Performed By  CRNA: Milagros Lozano CRNA  Preanesthetic Checklist  Completed: patient identified, surgical consent, pre-op evaluation, timeout performed, IV checked, risks and benefits discussed and monitors and equipment checked  Prep:  Pt Position:sitting  Sterile Tech:cap, gloves, mask and sterile barrier  Prep:DuraPrep  Monitoring:blood pressure monitoring  Epidural Block Procedure:  Approach:midline  Guidance:palpation technique  Location:L4-L5  Needle Type:Tuohy  Needle Gauge:17 G  Loss of Resistance Medium: air  Loss of Resistance: 6cm  Cath Depth at skin:12 cm  Paresthesia: none  Aspiration:negative  Test Dose:negative  Number of Attempts: 1  Post Assessment:  Dressing:occlusive dressing applied and secured with tape  Pt Tolerance:patient tolerated the procedure well with no apparent complications  Complications:no

## 2020-11-02 NOTE — ANESTHESIA PREPROCEDURE EVALUATION
Anesthesia Evaluation     Patient summary reviewed and Nursing notes reviewed   NPO Solid Status: > 6 hours  NPO Liquid Status: < 2 hours           Airway   Mallampati: II  TM distance: >3 FB  Neck ROM: full  Dental      Pulmonary - negative pulmonary ROS   Cardiovascular     (+) hypertension,       Neuro/Psych- negative ROS  GI/Hepatic/Renal/Endo    (+) obesity,       Musculoskeletal (-) negative ROS    Abdominal    Substance History - negative use     OB/GYN    (+) Pregnant, Preeclampsia, pregnancy induced hypertension        Other - negative ROS                       Anesthesia Plan    ASA 3     epidural         Plan discussed with attending.

## 2020-11-02 NOTE — L&D DELIVERY NOTE
[facility]  Vaginal Delivery Note    Delivery details     Delivery: Vaginal, Vacuum (Extractor)     YOB: 2019    Time of Birth: 3:31 PM      Anesthesia: Epidural     Delivering clinician: Isaias Wen    Forceps?   No   Vacuum? Yes    Shoulder dystocia present: No      Delivery narrative: Due to maternal exhaustion decision made to proceed with vacuum at +3 station.  Maternal bladder emptied with a red rubber catheter.  Vacuum applied at +3 station right occiput anterior.  On first maternal expulsive efforts delivered head without incident with easy traction.  Vacuum removed his head delivered through the introitus.  Mouth and nose bulb suction on perineum and once again after delivery.  After 30 seconds of delay cord clamped and infant placed on maternal abdomen.  Cord bloods obtained along with a segment of cord.  Placenta delivered spontaneously intact with normal three-vessel cord.  Right periurethral laceration repaired with interrupted 3-0 chromic.  Midline second-degree perineal laceration repaired with 3-0 chromic in standard fashion.    Infant details    Findings: male  infant     Infant observations: Weight: 2935 g (6 lb 7.5 oz)   Length: 18  in   Apgars:   @ 1 minute /      @ 5 minutes     Placenta, Cord, and Fluid    Placenta delivered  Spontaneous  at   11/2/2020  3:41 PM     Cord: 3 vessels  present.   Nuchal Cord?  no   Cord blood obtained: Yes      Repair    Episiotomy: None    Estimated Blood Loss: Est. Blood Loss (mL): 300 mL(Filed from Delivery Summary) (11/02/20 1531)       Complications  none    Disposition  Mother to Mother Baby/Postpartum  in stable condition currently.  Baby to NBN  in stable condition currently.      Isaias Wen MD  11/02/20  16:07 EST

## 2020-11-03 LAB
HCT VFR BLD AUTO: 27 % (ref 34–46.6)
HGB BLD-MCNC: 8.7 G/DL (ref 12–15.9)

## 2020-11-03 PROCEDURE — 85014 HEMATOCRIT: CPT | Performed by: OBSTETRICS & GYNECOLOGY

## 2020-11-03 PROCEDURE — 85018 HEMOGLOBIN: CPT | Performed by: OBSTETRICS & GYNECOLOGY

## 2020-11-03 RX ADMIN — DOCUSATE SODIUM 100 MG: 100 CAPSULE ORAL at 20:08

## 2020-11-03 RX ADMIN — IBUPROFEN 600 MG: 600 TABLET, FILM COATED ORAL at 10:17

## 2020-11-03 RX ADMIN — IBUPROFEN 600 MG: 600 TABLET, FILM COATED ORAL at 16:25

## 2020-11-03 RX ADMIN — IBUPROFEN 600 MG: 600 TABLET, FILM COATED ORAL at 22:10

## 2020-11-03 RX ADMIN — IBUPROFEN 600 MG: 600 TABLET, FILM COATED ORAL at 03:28

## 2020-11-03 RX ADMIN — DOCUSATE SODIUM 100 MG: 100 CAPSULE ORAL at 08:51

## 2020-11-03 NOTE — LACTATION NOTE
Outcome Summary: Attempted CC and FB positions.  Baby latched but no sucking at the breat.  Placed STS.  Teaching done, information given.  Mom has a breast pump at home.     11/03/20 0935   Maternal Information   Date of Referral 11/03/20   Person Making Referral other (see comments)  (courtesy)   Maternal Reason for Referral no prior breastfeeding experience;breastfeeding currently   Maternal Assessment   Breast Size Issue none   Breast Shape Bilateral:;round   Breast Density Bilateral:;soft   Nipples Bilateral:;everted;short   Left Nipple Symptoms intact   Right Nipple Symptoms intact   Maternal Infant Feeding   Maternal Emotional State anxious;receptive   Infant Positioning clutch/football;cross-cradle   Signs of Milk Transfer none noted   Pain with Feeding no   Latch Assistance full assistance needed   Support Person Involvement verbally supports mother   Milk Expression/Equipment   Equipment for Home Use breast pump ordered through insurance

## 2020-11-03 NOTE — PROGRESS NOTES
MARGARET Yvette Mercado [place]  : 1990  MRN: [id number]  CSN: [id number]    Hospital Day: 2    Postpartum Day #1  Subjective     CC: hospital follow-up    Her pain is well controlled.  Vaginal bleeding is less than a normal period.       Objective     Min/max vitals past 24 hours:   Temp  Min: 97.3 °F (36.3 °C)  Max: 98.6 °F (37 °C)  BP  Min: 122/66  Max: 172/107  Pulse  Min: 74  Max: 134  Resp  Min: 16  Max: 18        Abdomen: soft, non-tender; bowel sounds normal; no masses   fundus firm and non-tender   Pelvic: deferred     Results from last 7 days   Lab Units 20  0757 20  0758 10/29/20  1313   WBC 10*3/mm3  --  9.27 8.84   HEMOGLOBIN g/dL 8.7* 11.7* 11.2*   HEMATOCRIT % 27.0* 35.1 34.2   PLATELETS 10*3/mm3  --  170 157     Lab Results   Component Value Date    RH Positive 2020    HEPBSAG Non-Reactive 2020        Assessment   1. Postpartum Day #1 S/P vaginal delivery  2. Postpartum anemia     Plan   1. Continue routine postpartum care    Isaias Wen MD  11/3/2020  10:25 EST

## 2020-11-03 NOTE — PLAN OF CARE
Goal Outcome Evaluation:  Plan of Care Reviewed With: patient  Progress: improving  Outcome Summary: Attempted CC and FB positions.  Baby latched but no sucking at the breat.  Placed STS.  Teaching done, information given.  Mom has a breast pump at home.

## 2020-11-03 NOTE — PLAN OF CARE
Goal Outcome Evaluation:  Plan of Care Reviewed With: patient     Outcome Summary: VSS; up ad sha; voiding; pain well managed with Motrin; breastfeeding; sitz bath instructions given; laceration intact, using products; postpartum appointment scheduled for 12/16 at 1:40 p.m.; will continue to monitor.

## 2020-11-04 VITALS
BODY MASS INDEX: 36.32 KG/M2 | RESPIRATION RATE: 16 BRPM | DIASTOLIC BLOOD PRESSURE: 83 MMHG | WEIGHT: 225 LBS | SYSTOLIC BLOOD PRESSURE: 127 MMHG | TEMPERATURE: 98.6 F | HEART RATE: 88 BPM

## 2020-11-04 RX ORDER — IBUPROFEN 200 MG
200-600 TABLET ORAL EVERY 6 HOURS PRN
Start: 2020-11-04 | End: 2020-11-24

## 2020-11-04 RX ORDER — FERROUS SULFATE 325(65) MG
325 TABLET ORAL
Qty: 60 TABLET | Refills: 4
Start: 2020-11-04 | End: 2020-12-16

## 2020-11-04 RX ADMIN — IBUPROFEN 600 MG: 600 TABLET, FILM COATED ORAL at 04:20

## 2020-11-04 RX ADMIN — OXYCODONE HYDROCHLORIDE AND ACETAMINOPHEN 1 TABLET: 5; 325 TABLET ORAL at 08:19

## 2020-11-04 RX ADMIN — DOCUSATE SODIUM 100 MG: 100 CAPSULE ORAL at 08:17

## 2020-11-04 NOTE — LACTATION NOTE
11/04/20 0850   Maternal Information   Person Making Referral nurse;patient   Maternal Reason for Referral   (mom states baby has been breastfeeding well)   Infant Reason for Referral other (see comments)  (lost 7.16% from birth weight; no void/stool since 11/3)   Maternal Assessment   Breast Size Issue none   Breast Shape Bilateral:;wide   Breast Density Bilateral:;soft   Nipples Bilateral:;everted   Maternal Infant Feeding   Maternal Emotional State receptive;tense   Infant Positioning clutch/football   Signs of Milk Transfer   (Too sleepy to latch; left mom and baby in skin to skin)   Comfort Measures Before/During Feeding infant position adjusted;maternal position adjusted  (demonstrated how to get good/deep latch)   Milk Expression/Equipment   Breast Pump Type double electric, personal  (pump at home)   Breast Pumping   Breast Pumping Interventions post-feed pumping encouraged  (today after dc home)        11/04/20 0850   Maternal Information   Person Making Referral nurse;patient   Maternal Reason for Referral   (mom states baby has been breastfeeding well)   Infant Reason for Referral other (see comments)  (lost 7.16% from birth weight; no void/stool since 11/3)   Maternal Assessment   Breast Size Issue none   Breast Shape Bilateral:;wide   Breast Density Bilateral:;soft   Nipples Bilateral:;everted   Maternal Infant Feeding   Maternal Emotional State receptive;tense   Infant Positioning clutch/football   Signs of Milk Transfer   (Too sleepy to latch; left mom and baby in skin to skin)   Comfort Measures Before/During Feeding infant position adjusted;maternal position adjusted  (demonstrated how to get good/deep latch)   Milk Expression/Equipment   Breast Pump Type double electric, personal  (pump at home)   Breast Pumping   Breast Pumping Interventions post-feed pumping encouraged  (today after dc home)   Helped mom with position and discussed how to get deep latch. Mom will work on these. No void since  11/3 at ~1200 noon and no stool since 11/3 ~10 pm. Recommend pumping after feedings to help with milk supply and can finger/syringe feed pumped milk, if mom gets any colostrum. Discussed supplementing if ordered by pediatrician. To fu with pediatrician tomorrow. To call lactation services, if there are questions or concerns or if mom wants an outpatient clinic appt.

## 2020-11-04 NOTE — PAYOR COMM NOTE
"Izzy Hernandez (30 y.o. Female)     Auth#[id number]    Discharged home 11/4/2020 with Baby.    From:Yara Alfredo  #[phone]  Fax#[phone]      Date of Birth Social Security Number Address Home Phone MRN    1990  [address] [address] [phone] [id number]    [employer] Status          [employer]        Admission Date Admission Type Admitting Provider Attending Provider Department, Room/Bed    11/2/20 Elective Isaias Wen MD  [facility] MOTHER BABY 4B, N427/1    Discharge Date Discharge Disposition Discharge Destination        11/4/2020 Home or Self Care              Attending Provider: (none)   Allergies: No Known Allergies    Isolation: None   Infection: None   Code Status: Prior    Ht: 167.6 cm (66\")   Wt: 102 kg (225 lb)    Admission Cmt: None   Principal Problem: Postpartum care following vacuum delivery 11/2/2020 - Alfredo [Z39.2]                 Active Insurance as of 11/2/2020     Primary Coverage     Payor Plan Insurance Group Employer/Plan Group    [facility] [id number]     Payor Plan Address Payor Plan Phone Number Payor Plan Fax Number Effective Dates    [address]   1/1/2019 - None Entered    [facility] [id number]       Subscriber Name Subscriber Birth Date Member ID       IZZY HERNANDEZ 1990 [id number]           Secondary Coverage     Payor Plan Insurance Group Employer/Plan Group    [facility]     Payor Plan Address Payor Plan Phone Number Payor Plan Fax Number Effective Dates    [address]   1/1/2020 - None Entered    [place] [id number]       Subscriber Name Subscriber Birth Date Member ID       IZZY HERNANDEZ 1990 [id number]                 Emergency Contacts      (Rel.) Home Phone Work Phone Mobile Phone    DavidPapa (Spouse) -- -- [phone]               Discharge Summary      Behzad, " Isaias LEGGETT MD at 20 0931          Discharge Summary     Yvette Hernandez  : 1990  MRN: [id number]  CSN: [id number]    Date of Admission: 2020   Date of Discharge:  2020   Delivering Physician: Isaias Wen        Admission Diagnosis: 1. Preeclampsia [O14.90]   Discharge Diagnosis: 1. Same as above plus  2. Pregnancy at 37w0d - delivered  3. Postpartum anemia       Procedures: 2020  - Vaginal, Vacuum (Extractor)       Hospital Course  Patient is a 30 y.o.  who at 37w0d had a vaginal birth.  Her postpartum course was without complications.  On PPD # 2 she was ready for discharge.  She had normal lochia and pain well controlled with oral medications.    Infant  male  fetus weighing 2935 g (6 lb 7.5 oz)   Apgars -  7 @ 1 minute /  9 @ 5 minutes.    Discharge labs  Lab Results   Component Value Date    WBC 9.27 2020    HGB 8.7 (L) 2020    HCT 27.0 (L) 2020     2020       Discharge Medications     Discharge Medications      New Medications      Instructions Start Date   ibuprofen 200 MG tablet  Commonly known as: ADVIL,MOTRIN   200-600 mg, Oral, Every 6 Hours PRN         Changes to Medications      Instructions Start Date   ferrous sulfate 325 (65 FE) MG tablet  What changed: when to take this   325 mg, Oral, Daily With Breakfast         Continue These Medications      Instructions Start Date   PEPCID AC PO   1 each, Oral, Nightly PRN      PRENATAL VITAMIN PO   1 tablet, Oral, Daily             Discharge Disposition    Condition on Discharge: good   Follow-up: 6 weeks with Dr. Behzad Wen MD  2020    Electronically signed by Isaias Wen MD at 20 0932

## 2020-11-04 NOTE — PROGRESS NOTES
MARGARET Yvette Mercado [place]  : 1990  MRN: [id number]  CSN: [id number]    Hospital Day: 3    Postpartum Day #2  Subjective     CC: hospital follow-up    Her pain is well controlled.  Vaginal bleeding is less than a normal period.      Objective     Min/max vitals past 24 hours:   Temp  Min: 98.5 °F (36.9 °C)  Max: 98.6 °F (37 °C)  BP  Min: 127/83  Max: 137/73  Pulse  Min: 88  Max: 92  Resp  Min: 16  Max: 18        General: well developed; well nourished  no acute distress   Abdomen: fundus firm and non-tender   Pelvic: Not performed   Ext: Calves NT     Results from last 7 days   Lab Units 20  0757 20  0758 10/29/20  1313   WBC 10*3/mm3  --  9.27 8.84   HEMOGLOBIN g/dL 8.7* 11.7* 11.2*   HEMATOCRIT % 27.0* 35.1 34.2   PLATELETS 10*3/mm3  --  170 157     Lab Results   Component Value Date    RH Positive 2020    HEPBSAG Non-Reactive 2020        Assessment   1. Postpartum Day #2 S/P vaginal delivery  2. Postpartum anemia     Plan   1. Discharge to home  2. Advised no tampons or intercourse for 6 weeks.  3. D/C questions all answered  4. Fe x 3 months discussed  5. Follow-up appointment in 6 week(s)    Isaias Wen MD  2020  09:29 EST

## 2020-11-04 NOTE — LACTATION NOTE
11/04/20 2510   Maternal Information   Person Making Referral nurse  (baby just voided and stooled and awake for feeding)   Maternal Infant Feeding   Maternal Emotional State receptive;tense   Infant Positioning clutch/football  (too sleepy to latch--encouraged skin to skin)   Comfort Measures Before/During Feeding infant position adjusted;maternal position adjusted   Milk Expression/Equipment   Breast Pump Type double electric, personal  (pump after feedings after dc home)   baby still too sleepy to latch. Mom will watch for feeding cues and begin pumping at home. Able to hand express colostrum. To call lactation services, if there are questions or concerns.

## 2020-11-04 NOTE — DISCHARGE SUMMARY
Discharge Summary     Yvette Hernandez  : 1990  MRN: [id number]  CSN: [id number]    Date of Admission: 2020   Date of Discharge:  2020   Delivering Physician: Isaias Wen        Admission Diagnosis: 1. Preeclampsia [O14.90]   Discharge Diagnosis: 1. Same as above plus  2. Pregnancy at 37w0d - delivered  3. Postpartum anemia       Procedures: 2020  - Vaginal, Vacuum (Extractor)       Hospital Course  Patient is a 30 y.o.  who at 37w0d had a vaginal birth.  Her postpartum course was without complications.  On PPD # 2 she was ready for discharge.  She had normal lochia and pain well controlled with oral medications.    Infant  male  fetus weighing 2935 g (6 lb 7.5 oz)   Apgars -  7 @ 1 minute /  9 @ 5 minutes.    Discharge labs  Lab Results   Component Value Date    WBC 9.27 2020    HGB 8.7 (L) 2020    HCT 27.0 (L) 2020     2020       Discharge Medications     Discharge Medications      New Medications      Instructions Start Date   ibuprofen 200 MG tablet  Commonly known as: ADVIL,MOTRIN   200-600 mg, Oral, Every 6 Hours PRN         Changes to Medications      Instructions Start Date   ferrous sulfate 325 (65 FE) MG tablet  What changed: when to take this   325 mg, Oral, Daily With Breakfast         Continue These Medications      Instructions Start Date   PEPCID AC PO   1 each, Oral, Nightly PRN      PRENATAL VITAMIN PO   1 tablet, Oral, Daily             Discharge Disposition    Condition on Discharge: good   Follow-up: 6 weeks with Dr. Behzad Wen MD  2020

## 2020-11-09 ENCOUNTER — HOSPITAL ENCOUNTER (OUTPATIENT)
Facility: HOSPITAL | Age: 30
End: 2020-11-09
Attending: OBSTETRICS & GYNECOLOGY | Admitting: OBSTETRICS & GYNECOLOGY

## 2020-11-09 ENCOUNTER — HOSPITAL ENCOUNTER (EMERGENCY)
Facility: HOSPITAL | Age: 30
End: 2020-11-09
Attending: EMERGENCY MEDICINE

## 2020-11-09 ENCOUNTER — HOSPITAL ENCOUNTER (OUTPATIENT)
Facility: HOSPITAL | Age: 30
Discharge: HOME | End: 2020-11-09
Attending: OBSTETRICS & GYNECOLOGY | Admitting: OBSTETRICS & GYNECOLOGY

## 2020-11-09 VITALS
DIASTOLIC BLOOD PRESSURE: 115 MMHG | BODY MASS INDEX: 34.39 KG/M2 | TEMPERATURE: 97.8 F | SYSTOLIC BLOOD PRESSURE: 165 MMHG | WEIGHT: 214 LBS | HEART RATE: 97 BPM | OXYGEN SATURATION: 100 % | RESPIRATION RATE: 18 BRPM | HEIGHT: 66 IN

## 2020-11-09 VITALS
OXYGEN SATURATION: 97 % | TEMPERATURE: 98.2 F | RESPIRATION RATE: 16 BRPM | HEART RATE: 91 BPM | SYSTOLIC BLOOD PRESSURE: 152 MMHG | DIASTOLIC BLOOD PRESSURE: 104 MMHG

## 2020-11-09 LAB
ALP SERPL-CCNC: 121 U/L (ref 39–117)
ALT SERPL W P-5'-P-CCNC: 16 U/L (ref 1–33)
AST SERPL-CCNC: 22 U/L (ref 1–32)
BILIRUB SERPL-MCNC: 0.2 MG/DL (ref 0–1.2)
CREAT SERPL-MCNC: 0.73 MG/DL (ref 0.57–1)
DEPRECATED RDW RBC AUTO: 46.1 FL (ref 37–54)
ERYTHROCYTE [DISTWIDTH] IN BLOOD BY AUTOMATED COUNT: 13 % (ref 12.3–15.4)
EXPIRATION DATE: NORMAL
HCT VFR BLD AUTO: 30.8 % (ref 34–46.6)
HGB BLD-MCNC: 9.8 G/DL (ref 12–15.9)
LDH SERPL-CCNC: 387 U/L (ref 135–214)
Lab: 1081
MCH RBC QN AUTO: 30.8 PG (ref 26.6–33)
MCHC RBC AUTO-ENTMCNC: 31.8 G/DL (ref 31.5–35.7)
MCV RBC AUTO: 96.9 FL (ref 79–97)
PLATELET # BLD AUTO: 289 10*3/MM3 (ref 140–450)
PMV BLD AUTO: 10.7 FL (ref 6–12)
PROT UR STRIP-MCNC: NEGATIVE MG/DL
RBC # BLD AUTO: 3.18 10*6/MM3 (ref 3.77–5.28)
URATE SERPL-MCNC: 5 MG/DL (ref 2.4–5.7)
WBC # BLD AUTO: 9.96 10*3/MM3 (ref 3.4–10.8)

## 2020-11-09 PROCEDURE — 82247 BILIRUBIN TOTAL: CPT | Performed by: OBSTETRICS & GYNECOLOGY

## 2020-11-09 PROCEDURE — 99214 OFFICE O/P EST MOD 30 MIN: CPT | Performed by: OBSTETRICS & GYNECOLOGY

## 2020-11-09 PROCEDURE — 84450 TRANSFERASE (AST) (SGOT): CPT | Performed by: OBSTETRICS & GYNECOLOGY

## 2020-11-09 PROCEDURE — 82565 ASSAY OF CREATININE: CPT | Performed by: OBSTETRICS & GYNECOLOGY

## 2020-11-09 PROCEDURE — 84075 ASSAY ALKALINE PHOSPHATASE: CPT | Performed by: OBSTETRICS & GYNECOLOGY

## 2020-11-09 PROCEDURE — G0463 HOSPITAL OUTPT CLINIC VISIT: HCPCS

## 2020-11-09 PROCEDURE — 85027 COMPLETE CBC AUTOMATED: CPT | Performed by: OBSTETRICS & GYNECOLOGY

## 2020-11-09 PROCEDURE — 84460 ALANINE AMINO (ALT) (SGPT): CPT | Performed by: OBSTETRICS & GYNECOLOGY

## 2020-11-09 PROCEDURE — A9270 NON-COVERED ITEM OR SERVICE: HCPCS | Performed by: OBSTETRICS & GYNECOLOGY

## 2020-11-09 PROCEDURE — 81002 URINALYSIS NONAUTO W/O SCOPE: CPT | Performed by: OBSTETRICS & GYNECOLOGY

## 2020-11-09 PROCEDURE — 63710000001 NIFEDIPINE 10 MG CAPSULE: Performed by: OBSTETRICS & GYNECOLOGY

## 2020-11-09 PROCEDURE — 63710000001 BUTALBITAL-ACETAMINOPHEN-CAFFEINE 50-325-40 MG TABLET: Performed by: OBSTETRICS & GYNECOLOGY

## 2020-11-09 PROCEDURE — 36415 COLL VENOUS BLD VENIPUNCTURE: CPT | Performed by: OBSTETRICS & GYNECOLOGY

## 2020-11-09 PROCEDURE — 84550 ASSAY OF BLOOD/URIC ACID: CPT | Performed by: OBSTETRICS & GYNECOLOGY

## 2020-11-09 PROCEDURE — 83615 LACTATE (LD) (LDH) ENZYME: CPT | Performed by: OBSTETRICS & GYNECOLOGY

## 2020-11-09 RX ORDER — NIFEDIPINE 10 MG/1
10 CAPSULE ORAL 4 TIMES DAILY
Qty: 40 CAPSULE | Refills: 0 | Status: SHIPPED | OUTPATIENT
Start: 2020-11-09 | End: 2020-12-16

## 2020-11-09 RX ORDER — BUTALBITAL, ACETAMINOPHEN AND CAFFEINE 50; 325; 40 MG/1; MG/1; MG/1
1 TABLET ORAL ONCE
Status: COMPLETED | OUTPATIENT
Start: 2020-11-09 | End: 2020-11-09

## 2020-11-09 RX ORDER — NIFEDIPINE 10 MG/1
10 CAPSULE ORAL ONCE
Status: COMPLETED | OUTPATIENT
Start: 2020-11-09 | End: 2020-11-09

## 2020-11-09 RX ADMIN — NIFEDIPINE 10 MG: 10 CAPSULE ORAL at 02:21

## 2020-11-09 RX ADMIN — BUTALBITAL, ACETAMINOPHEN, AND CAFFEINE 1 TABLET: 50; 325; 40 TABLET ORAL at 02:21

## 2020-11-09 RX ADMIN — BUTALBITAL, ACETAMINOPHEN, AND CAFFEINE 1 TABLET: 50; 325; 40 TABLET ORAL at 02:58

## 2020-11-09 NOTE — H&P
Yvette  Obstetric History and Physical    Chief Complaint   Patient presents with   • Elevated Blood Pressure       HPI:    Patient is a 30 y.o. female  who is now PPD #7.    She was induced due to gestational hypertension - not taking medications and did not require magnesium.  She di have mild range BPs at the time of discharge.  She has had a HA that she describes as her normal baseline for the last several days.  She had been taking Advil for it getting some relief, but only to return.  She admits that she has not slept much since discharge.  She denies RUQ pain and vision changes.  She was not discharged on any medication.  She received a single dose of Procardia 10 mg which did bring her BPs down some.   She was also given Fioricet for her HA.  She had some improvement, but says she is tired and needs sleep.   She was really hoping to not be admitted and placed on Magnesium.           The following portions of the patients history were reviewed and updated as appropriate: current medications, allergies, past medical history, past surgical history, past family history, past social history and problem list .       Prenatal Information:   Maternal Prenatal Labs  Blood Type No results found for: ABO   Rh Status No results found for: RH   Antibody Screen No results found for: ABSCRN   Gonnorhea No results found for: GCCX   Chlamydia No results found for: CLAMYDCU   RPR No results found for: RPR   Syphilis Antibody No results found for: SYPHILIS   Rubella No results found for: RUBELLAIGGIN   Hepatitis B Surface Antigen No results found for: HEPBSAG   HIV-1 Antibody No results found for: LABHIV1   Hepatitis C Antibody No results found for: HEPCAB   Rapid Urin Drug Screen No results found for: AMPMETHU, BARBITSCNUR, LABBENZSCN, LABMETHSCN, LABOPIASCN, THCURSCR, COCAINEUR, AMPHETSCREEN, PROPOXSCN, BUPRENORSCNU, METAMPSCNUR, OXYCODONESCN, TRICYCLICSCN   Group B Strep Culture No results found for: GBSANTIGEN            External Prenatal Results     Pregnancy Outside Results - Transcribed From Office Records - See Scanned Records For Details     Test Value Date Time    Hgb 9.8 g/dL 11/09/20 0200      8.7 g/dL 11/03/20 0757      11.7 g/dL 11/02/20 0758      11.2 g/dL 10/29/20 1313      11.4 g/dL 09/21/20 1111      10.6 g/dL 08/17/20 0918      12.9 g/dL 04/22/20 1134    Hct 30.8 % 11/09/20 0200      27.0 % 11/03/20 0757      35.1 % 11/02/20 0758      34.2 % 10/29/20 1313      33.2 % 09/21/20 1111      31.2 % 08/17/20 0918      36.7 % 04/22/20 1134    ABO A  11/02/20 0758    Rh Positive  11/02/20 0758    Antibody Screen Negative  11/02/20 0758      Negative  04/22/20 1134    Glucose Fasting GTT       Glucose Tolerance Test 1 hour       Glucose Tolerance Test 3 hour       Gonorrhea (discrete) Negative  05/13/20 1104    Chlamydia (discrete) Negative  05/13/20 1104    RPR Non-Reactive  04/22/20 1134    VDRL       Syphilis Antibody       Rubella Positive  04/22/20 1134    HBsAg Non-Reactive  04/22/20 1134    Herpes Simplex Virus PCR       Herpes Simplex VIrus Culture       HIV Non-Reactive  04/22/20 1134    Hep C RNA Quant PCR       Hep C Antibody Non-Reactive  04/22/20 1134    AFP       Group B Strep       GBS Susceptibility to Clindamycin       GBS Susceptibility to Erythromycin       Fetal Fibronectin       Genetic Testing, Maternal Blood             Drug Screening     Test Value Date Time    Urine Drug Screen       Amphetamine Screen Negative  04/22/20 1134    Barbiturate Screen Negative  04/22/20 1134    Benzodiazepine Screen Negative  04/22/20 1134    Methadone Screen Negative  04/22/20 1134    Phencyclidine Screen Negative  04/22/20 1134    Opiates Screen Negative  04/22/20 1134    THC Screen Negative  04/22/20 1134    Cocaine Screen       Propoxyphene Screen Negative  04/22/20 1134    Buprenorphine Screen Negative  04/22/20 1134    Methamphetamine Screen       Oxycodone Screen Negative  04/22/20 1134    Tricyclic  Antidepressants Screen Negative  20 1134                  Past OB History:     OB History    Para Term  AB Living   1 1 1 0 0 1   SAB TAB Ectopic Molar Multiple Live Births   0 0 0 0 0 1      # Outcome Date GA Lbr Akira/2nd Weight Sex Delivery Anes PTL Lv   1 Term 20 37w0d 04:59 / 02:17 2935 g (6 lb 7.5 oz) M Vag-Vacuum EPI N LÁVARO      Name: JOVANNA HANDLEY      Apgar1: 7  Apgar5: 9      Obstetric Comments   2020 - Alfredo       Past Medical History: 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 She has never used smokeless tobacco.   reports previous alcohol use.   reports no history of drug use.        Review of Systems  Denies fever, CP, Shortness of air, muscle weakness,                                             and rashes      Objective     Vital Signs Range for the last 24 hours  Temperature: Temp:  [97.8 °F (36.6 °C)-98.2 °F (36.8 °C)] 98.2 °F (36.8 °C)   Temp Source: Temp src: Oral   BP: BP: (145-165)/() 152/104   Pulse: Heart Rate:  [91-97] 91   Respirations: Resp:  [16-18] 16   SPO2: SpO2:  [97 %-100 %] 97 %   O2 Amount (l/min):     O2 Devices Device (Oxygen Therapy): room air   Weight: Weight:  [97.1 kg (214 lb)] 97.1 kg (214 lb)     Physical Examination: General appearance - alert, well appearing, and in no distress and oriented to person, place, and time  Chest - clear to auscultation, no wheezes, rales or rhonchi, symmetric air entry  Heart - S1 and S2 normal, no murmurs noted  Abdomen - soft, nontender, nondistended, no  masses or organomegaly  no rebound tenderness noted  bowel sounds normal  No guarding, No RUQ pain  Extremities - pedal edema 1+ , DTR +1        Laboratory Results:   Lab Results   Component Value Date     2020    HGB 9.8 (L) 2020    HCT 30.8 (L) 2020    WBC 9.96 2020     Lab Results   Component Value Date    HGB 9.8 (L) 2020     2020    AST 22 2020    ALT 16 2020     (H) 2020    URICACID 5.0 2020    BUN 8 10/29/2020    CREATININE 0.73 2020    GLUCOSE 94 10/29/2020     Urine drip: - NEGATIVE         Assessment/Plan  29 yo  PPD #7 with HA and elevated BPs.   1.  Labs are normal   2.  Will initial hypertension treatment wit Procardia 10 mg Q 6  -  RX sent   3.  If symptoms worsen or other concerns she is to return for further evaluation  4.  Recommend follow-up later this this week for BP check.    5.  Izzy understands, agrees and is happy with plan  6.  D/C home.       Eloy Hendrickson MD  2020  03:33 EST

## 2020-11-09 NOTE — DISCHARGE INSTR - PHARMACY
Procardia 10 mg every 4-6 hours to help control your blood pressure.     Fioricet 2 tablets was administered 11/9/2020 at 0220 for your headache pain.

## 2020-11-11 ENCOUNTER — POSTPARTUM VISIT (OUTPATIENT)
Dept: OBSTETRICS AND GYNECOLOGY | Facility: CLINIC | Age: 30
End: 2020-11-11

## 2020-11-11 VITALS
DIASTOLIC BLOOD PRESSURE: 90 MMHG | BODY MASS INDEX: 18.16 KG/M2 | WEIGHT: 113 LBS | HEIGHT: 66 IN | SYSTOLIC BLOOD PRESSURE: 142 MMHG

## 2020-11-11 PROCEDURE — 99213 OFFICE O/P EST LOW 20 MIN: CPT | Performed by: OBSTETRICS & GYNECOLOGY

## 2020-11-11 NOTE — PROGRESS NOTES
"Subjective   Chief Complaint   Patient presents with   • Postpartum Care     PPV 1w2d ; recent ER visit for high blood pressure. pt states she was prescribed meds     Izzy Hernandez is a 30 y.o. year old  presenting to be seen for BP check.  She had a vaginal delivery.  Her son is doing well. Patient had a VAVD on . She was induced secondary to GHTN vs. Pre-eclampsia. She did not require Magnesium Sulfate or BP medications at the time of discharge. Patient was seen on  night/Morning morning on L&D. Patient reports that her blood pressures remained elevated at home and she reported a headache. Patient was discharged home and started on Procardia 10mg q6 hours. She reports she is feeling better. Reports her headache has resolved. She is taking Procardia. She monitors her blood pressure randomly at home and states it is mostly 140/90s.    She does not have concerns about post-partum blues/depression.   Denville Score = 0  She is breast feeding.      The following portions of the patient's history were reviewed and updated as appropriate:current medications and allergies    Social History    Tobacco Use      Smoking status: Never Smoker      Smokeless tobacco: Never Used      Review of Systems  Constitutional POS: nothing reported    NEG: anorexia or night sweats   Genitourinary POS: nothing reported    NEG: dysuria or hematuria      Gastointestinal POS: nothing reported    NEG: bloating, change in bowel habits, melena or reflux symptoms   Breast POS: nothing reported    NEG: persistent breast lump, skin dimpling or nipple discharge        Objective   /90   Ht 167.6 cm (66\")   Wt 51.3 kg (113 lb)   LMP 2020   Breastfeeding Yes   BMI 18.24 kg/m²     General:  well developed; well nourished  no acute distress   Abdomen: soft, non-tender; no masses  no umbilical or inguinal hernias are present  no hepato-splenomegaly   Ext: 2+ DTRs          Assessment   1. Postpartum " Hypertension     Plan   1. Patient is without any signs or symptoms of severe disease. Precautions given. Patient to continue Procardia for now. Instructed to call with persistent readings of 150/100. Patient verbalized understanding.  2. The importance of keeping all planned follow-up and taking all medications as prescribed was emphasized.  3. Follow up for recheck of BP in 1 week.    No orders of the defined types were placed in this encounter.         This note was electronically signed.    Melodie Polanco, DO  November 11, 2020    Note: Speech recognition transcription software may have been used to create portions of this document.  An attempt at proofreading has been made but errors in transcription could still be present.

## 2020-11-18 ENCOUNTER — POSTPARTUM VISIT (OUTPATIENT)
Dept: OBSTETRICS AND GYNECOLOGY | Facility: CLINIC | Age: 30
End: 2020-11-18

## 2020-11-18 VITALS
WEIGHT: 208 LBS | BODY MASS INDEX: 33.43 KG/M2 | HEIGHT: 66 IN | SYSTOLIC BLOOD PRESSURE: 128 MMHG | DIASTOLIC BLOOD PRESSURE: 74 MMHG

## 2020-11-18 PROCEDURE — 99213 OFFICE O/P EST LOW 20 MIN: CPT | Performed by: OBSTETRICS & GYNECOLOGY

## 2020-11-20 NOTE — PROGRESS NOTES
"Subjective   Chief Complaint   Patient presents with   • Postpartum Care     PPV 2w2d; BP check; pt states her BP is getting better top has been under 130, bottom has been around 90     Izzy Hernandez is a 30 y.o. year old  presenting to be seen for BP check. She had a vaginal delivery.  Her son is doing well. Patient had a VAVD on . She was induced secondary to GHTN vs. Pre-eclampsia. She did not require Magnesium Sulfate or BP medications at the time of discharge. Patient was seen on  on L&D for elevated blood pressures and headache. Patient was discharged home and started on Procardia 10mg q6 hours. Patient report she continues to do well. Her blood pressures are improving at home. She denies any headache or vision changes.    Since delivery she has not been sexually active.  She does not have concerns about post-partum blues/depression.   Lee Score = 0  She is breastfeeding.    The following portions of the patient's history were reviewed and updated as appropriate:current medications and allergies    Social History    Tobacco Use      Smoking status: Never Smoker      Smokeless tobacco: Never Used      Review of Systems  Constitutional POS: nothing reported    NEG: anorexia or night sweats   Genitourinary POS: nothing reported    NEG: dysuria or hematuria      Gastointestinal POS: nothing reported    NEG: bloating, change in bowel habits, melena or reflux symptoms   Breast POS: nothing reported    NEG: persistent breast lump, skin dimpling or nipple discharge        Objective   /74   Ht 167.6 cm (66\")   Wt 94.3 kg (208 lb)   LMP 2020   Breastfeeding Yes   BMI 33.57 kg/m²     General:  well developed; well nourished  no acute distress   Abdomen: soft, non-tender; no masses  no umbilical or inguinal hernias are present  no hepato-splenomegaly   Pelvis: Not performed.          Assessment   1. Postpartum Hypertension     Plan   1. Blood pressure improved. Patient is " without any signs or symptoms of severe disease. Recommend continuation of Procardia through the weekend and then can discontinue. Patient to continue to monitor blood pressures at home and instructed to call with readings of 150/100.  2. The importance of keeping all planned follow-up and taking all medications as prescribed was emphasized.  3. Follow up for postpartum visit as scheduled.     No orders of the defined types were placed in this encounter.         This note was electronically signed.    Melodie Polanco, DO  November 18, 2020    Note: Speech recognition transcription software may have been used to create portions of this document.  An attempt at proofreading has been made but errors in transcription could still be present.

## 2020-11-24 ENCOUNTER — HOSPITAL ENCOUNTER (EMERGENCY)
Facility: HOSPITAL | Age: 30
Discharge: HOME | End: 2020-11-24
Attending: EMERGENCY MEDICINE | Admitting: EMERGENCY MEDICINE

## 2020-11-24 ENCOUNTER — TELEPHONE (OUTPATIENT)
Dept: OBSTETRICS AND GYNECOLOGY | Facility: CLINIC | Age: 30
End: 2020-11-24

## 2020-11-24 ENCOUNTER — APPOINTMENT (OUTPATIENT)
Dept: CT IMAGING | Facility: HOSPITAL | Age: 30
End: 2020-11-24

## 2020-11-24 ENCOUNTER — APPOINTMENT (OUTPATIENT)
Dept: GENERAL RADIOLOGY | Facility: HOSPITAL | Age: 30
End: 2020-11-24

## 2020-11-24 ENCOUNTER — TELEPHONE (OUTPATIENT)
Dept: GASTROENTEROLOGY | Facility: CLINIC | Age: 30
End: 2020-11-24

## 2020-11-24 VITALS
HEART RATE: 79 BPM | RESPIRATION RATE: 16 BRPM | HEIGHT: 66 IN | WEIGHT: 200 LBS | BODY MASS INDEX: 32.14 KG/M2 | SYSTOLIC BLOOD PRESSURE: 127 MMHG | OXYGEN SATURATION: 99 % | TEMPERATURE: 98 F | DIASTOLIC BLOOD PRESSURE: 88 MMHG

## 2020-11-24 DIAGNOSIS — R10.11 BILATERAL UPPER ABDOMINAL DISCOMFORT: Primary | ICD-10-CM

## 2020-11-24 DIAGNOSIS — R10.11 RUQ PAIN: Primary | ICD-10-CM

## 2020-11-24 DIAGNOSIS — K82.8 SLUDGE IN GALLBLADDER: ICD-10-CM

## 2020-11-24 DIAGNOSIS — K80.50 BILIARY COLIC: ICD-10-CM

## 2020-11-24 DIAGNOSIS — R10.12 BILATERAL UPPER ABDOMINAL DISCOMFORT: Primary | ICD-10-CM

## 2020-11-24 DIAGNOSIS — Z78.9 BREASTFEEDING (INFANT): ICD-10-CM

## 2020-11-24 DIAGNOSIS — O13.9 GESTATIONAL HYPERTENSION, ANTEPARTUM: ICD-10-CM

## 2020-11-24 LAB
ALBUMIN SERPL-MCNC: 4.3 G/DL (ref 3.5–5.2)
ALBUMIN/GLOB SERPL: 1.7 G/DL
ALP SERPL-CCNC: 106 U/L (ref 39–117)
ALT SERPL W P-5'-P-CCNC: 19 U/L (ref 1–33)
ANION GAP SERPL CALCULATED.3IONS-SCNC: 11 MMOL/L (ref 5–15)
AST SERPL-CCNC: 38 U/L (ref 1–32)
BACTERIA UR QL AUTO: ABNORMAL /HPF
BASOPHILS # BLD AUTO: 0.01 10*3/MM3 (ref 0–0.2)
BASOPHILS NFR BLD AUTO: 0.2 % (ref 0–1.5)
BILIRUB SERPL-MCNC: 0.4 MG/DL (ref 0–1.2)
BILIRUB UR QL STRIP: NEGATIVE
BUN SERPL-MCNC: 24 MG/DL (ref 6–20)
BUN/CREAT SERPL: 33.8 (ref 7–25)
CALCIUM SPEC-SCNC: 9.2 MG/DL (ref 8.6–10.5)
CHLORIDE SERPL-SCNC: 103 MMOL/L (ref 98–107)
CLARITY UR: CLEAR
CO2 SERPL-SCNC: 23 MMOL/L (ref 22–29)
COLOR UR: YELLOW
CREAT SERPL-MCNC: 0.71 MG/DL (ref 0.57–1)
DEPRECATED RDW RBC AUTO: 42.3 FL (ref 37–54)
EOSINOPHIL # BLD AUTO: 0.11 10*3/MM3 (ref 0–0.4)
EOSINOPHIL NFR BLD AUTO: 1.8 % (ref 0.3–6.2)
ERYTHROCYTE [DISTWIDTH] IN BLOOD BY AUTOMATED COUNT: 12.6 % (ref 12.3–15.4)
GFR SERPL CREATININE-BSD FRML MDRD: 97 ML/MIN/1.73
GLOBULIN UR ELPH-MCNC: 2.6 GM/DL
GLUCOSE SERPL-MCNC: 115 MG/DL (ref 65–99)
GLUCOSE UR STRIP-MCNC: NEGATIVE MG/DL
HCT VFR BLD AUTO: 33.6 % (ref 34–46.6)
HGB BLD-MCNC: 10.8 G/DL (ref 12–15.9)
HGB UR QL STRIP.AUTO: NEGATIVE
HOLD SPECIMEN: NORMAL
HOLD SPECIMEN: NORMAL
HYALINE CASTS UR QL AUTO: ABNORMAL /LPF
IMM GRANULOCYTES # BLD AUTO: 0.01 10*3/MM3 (ref 0–0.05)
IMM GRANULOCYTES NFR BLD AUTO: 0.2 % (ref 0–0.5)
KETONES UR QL STRIP: NEGATIVE
LEUKOCYTE ESTERASE UR QL STRIP.AUTO: ABNORMAL
LIPASE SERPL-CCNC: 56 U/L (ref 13–60)
LYMPHOCYTES # BLD AUTO: 2.19 10*3/MM3 (ref 0.7–3.1)
LYMPHOCYTES NFR BLD AUTO: 36.6 % (ref 19.6–45.3)
MCH RBC QN AUTO: 29.5 PG (ref 26.6–33)
MCHC RBC AUTO-ENTMCNC: 32.1 G/DL (ref 31.5–35.7)
MCV RBC AUTO: 91.8 FL (ref 79–97)
MONOCYTES # BLD AUTO: 0.37 10*3/MM3 (ref 0.1–0.9)
MONOCYTES NFR BLD AUTO: 6.2 % (ref 5–12)
NEUTROPHILS NFR BLD AUTO: 3.3 10*3/MM3 (ref 1.7–7)
NEUTROPHILS NFR BLD AUTO: 55 % (ref 42.7–76)
NITRITE UR QL STRIP: NEGATIVE
NRBC BLD AUTO-RTO: 0 /100 WBC (ref 0–0.2)
PH UR STRIP.AUTO: 7 [PH] (ref 5–8)
PLATELET # BLD AUTO: 258 10*3/MM3 (ref 140–450)
PMV BLD AUTO: 10.7 FL (ref 6–12)
POTASSIUM SERPL-SCNC: 3.5 MMOL/L (ref 3.5–5.2)
PROT SERPL-MCNC: 6.9 G/DL (ref 6–8.5)
PROT UR QL STRIP: NEGATIVE
QT INTERVAL: 396 MS
QTC INTERVAL: 445 MS
RBC # BLD AUTO: 3.66 10*6/MM3 (ref 3.77–5.28)
RBC # UR: ABNORMAL /HPF
REF LAB TEST METHOD: ABNORMAL
SODIUM SERPL-SCNC: 137 MMOL/L (ref 136–145)
SP GR UR STRIP: 1.03 (ref 1–1.03)
SQUAMOUS #/AREA URNS HPF: ABNORMAL /HPF
UROBILINOGEN UR QL STRIP: ABNORMAL
WBC # BLD AUTO: 5.99 10*3/MM3 (ref 3.4–10.8)
WBC UR QL AUTO: ABNORMAL /HPF
WHOLE BLOOD HOLD SPECIMEN: NORMAL
WHOLE BLOOD HOLD SPECIMEN: NORMAL

## 2020-11-24 PROCEDURE — 93005 ELECTROCARDIOGRAM TRACING: CPT | Performed by: PHYSICIAN ASSISTANT

## 2020-11-24 PROCEDURE — 81001 URINALYSIS AUTO W/SCOPE: CPT | Performed by: EMERGENCY MEDICINE

## 2020-11-24 PROCEDURE — 99284 EMERGENCY DEPT VISIT MOD MDM: CPT

## 2020-11-24 PROCEDURE — 71045 X-RAY EXAM CHEST 1 VIEW: CPT

## 2020-11-24 PROCEDURE — 85025 COMPLETE CBC W/AUTO DIFF WBC: CPT | Performed by: EMERGENCY MEDICINE

## 2020-11-24 PROCEDURE — 96374 THER/PROPH/DIAG INJ IV PUSH: CPT

## 2020-11-24 PROCEDURE — 80053 COMPREHEN METABOLIC PANEL: CPT | Performed by: EMERGENCY MEDICINE

## 2020-11-24 PROCEDURE — 74176 CT ABD & PELVIS W/O CONTRAST: CPT

## 2020-11-24 PROCEDURE — 83690 ASSAY OF LIPASE: CPT | Performed by: EMERGENCY MEDICINE

## 2020-11-24 RX ORDER — OXYCODONE HYDROCHLORIDE AND ACETAMINOPHEN 5; 325 MG/1; MG/1
1 TABLET ORAL EVERY 4 HOURS PRN
Qty: 8 TABLET | Refills: 0 | Status: SHIPPED | OUTPATIENT
Start: 2020-11-24 | End: 2020-11-26 | Stop reason: HOSPADM

## 2020-11-24 RX ORDER — ONDANSETRON 4 MG/1
4 TABLET, ORALLY DISINTEGRATING ORAL EVERY 4 HOURS
Qty: 12 TABLET | Refills: 0 | Status: SHIPPED | OUTPATIENT
Start: 2020-11-24 | End: 2020-11-26 | Stop reason: HOSPADM

## 2020-11-24 RX ORDER — SODIUM CHLORIDE 9 MG/ML
10 INJECTION INTRAVENOUS AS NEEDED
Status: DISCONTINUED | OUTPATIENT
Start: 2020-11-24 | End: 2020-11-24 | Stop reason: HOSPADM

## 2020-11-24 RX ORDER — SIMETHICONE 80 MG
80 TABLET,CHEWABLE ORAL ONCE
Status: COMPLETED | OUTPATIENT
Start: 2020-11-24 | End: 2020-11-24

## 2020-11-24 RX ORDER — OMEPRAZOLE 20 MG/1
20 CAPSULE, DELAYED RELEASE ORAL 2 TIMES DAILY
Qty: 40 CAPSULE | Refills: 0 | Status: SHIPPED | OUTPATIENT
Start: 2020-11-24 | End: 2020-12-16

## 2020-11-24 RX ORDER — PANTOPRAZOLE SODIUM 40 MG/10ML
40 INJECTION, POWDER, LYOPHILIZED, FOR SOLUTION INTRAVENOUS ONCE
Status: COMPLETED | OUTPATIENT
Start: 2020-11-24 | End: 2020-11-24

## 2020-11-24 RX ORDER — SODIUM CHLORIDE 9 MG/ML
10 INJECTION INTRAVENOUS AS NEEDED
Status: DISCONTINUED | OUTPATIENT
Start: 2020-11-24 | End: 2020-11-26 | Stop reason: HOSPADM

## 2020-11-24 RX ADMIN — SIMETHICONE 80 MG: 80 TABLET, CHEWABLE ORAL at 04:43

## 2020-11-24 RX ADMIN — PANTOPRAZOLE SODIUM 40 MG: 40 INJECTION, POWDER, FOR SOLUTION INTRAVENOUS at 03:09

## 2020-11-24 NOTE — ED PROVIDER NOTES
"Subjective   This is a 30-year-old female that presents to the ER with upper abdominal pain that started around 1 AM, which is 1 hour prior to arrival.  Patient reports abdominal discomfort as pressure and \"pain\".  She denied any symptoms of indigestion such as belching, fullness, or acid taste in her mouth.  She denies any nausea or vomiting.  She took some Tylenol for a headache at bedtime, as well as Pepcid AC when symptoms of discomfort started.  Patient reports eating and Di'Osvaldo winstonzza at around 1830 this evening.  She denies any chest pain or shortness of breath.  She denies any recent URI symptoms such as nasal congestion cough, loss of taste or smell, or Covid symptoms or exposure.  Patient denies any previous abdominal surgeries.  Patient just had normal vaginal delivery on 11/2/2020 per OB/GYN, Dr. Wen.  Patient did have history of preeclampsia, but blood pressure remained stable postpartum.  Patient took some antihypertensive medications for a few days after delivery, but she discontinued those in the last 48 hours.  Blood pressure upon arrival is 113/65.  Patient denies any pedal edema to lower extremities.  She denies any dysuria, urgency, or frequency.  She had a normal bowel movement earlier today.  Patient denies any frequent NSAIDs or alcohol use.  She is currently breast-feeding.  No other symptoms at this time.      History provided by:  Patient  Abdominal Pain  Pain location:  Epigastric, RUQ and LUQ  Pain quality: pressure    Pain quality comment:  \"pain\"  Pain severity:  Moderate  Onset quality:  Sudden  Duration:  1 hour  Timing:  Constant  Progression:  Improving  Chronicity:  New  Context: not alcohol use and not previous surgeries    Context comment:  Patient reports sudden onset of upper abdominal discomfort 1 hour prior to arrival.  Pain is described as pressure.  Increased pain to epigastric and left upper quadrant, but patient reports mild right upper quadrant tenderness.  No " nausea/vomiting.   Relieved by:  Nothing  Worsened by:  Nothing  Ineffective treatments:  OTC medications and acetaminophen (Tylenol and Pepcid AC)  Associated symptoms: no anorexia, no belching, no chest pain, no chills, no constipation (Normal BM earlier today), no cough, no diarrhea, no dysuria, no fatigue, no fever, no hematemesis, no hematochezia, no hematuria, no nausea, no shortness of breath, no sore throat and no vomiting    Risk factors: no alcohol abuse, has not had multiple surgeries and no NSAID use        Review of Systems   Constitutional: Negative.  Negative for appetite change, chills, diaphoresis, fatigue and fever.   HENT: Negative.  Negative for congestion, postnasal drip, rhinorrhea, sinus pressure, sinus pain and sore throat.         No loss of taste or smell.   Respiratory: Negative.  Negative for cough, chest tightness and shortness of breath.    Cardiovascular: Negative.  Negative for chest pain, palpitations and leg swelling.   Gastrointestinal: Positive for abdominal pain (Epigastric, right upper quadrant, and left upper quadrant). Negative for abdominal distention, anorexia, blood in stool, constipation (Normal BM earlier today), diarrhea, hematemesis, hematochezia, nausea and vomiting.   Genitourinary: Negative for decreased urine volume, dysuria, flank pain, frequency, hematuria, pelvic pain and urgency.        Recent vaginal delivery without complications on 11/2/2020.  Patient currently breast-feeding.   Musculoskeletal: Negative.  Negative for back pain.   Neurological: Negative.    All other systems reviewed and are negative.      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    • Gestational hypertension, third trimester 10/29/2020   • H/O melanoma in situ - on back 06/2018       No Known Allergies    Past Surgical History:   Procedure Laterality Date   • SKIN CANCER EXCISION  06/2018    MIS on back   • WISDOM TOOTH EXTRACTION  2012        Family History   Problem Relation Age of Onset   • Diabetes Father        Social History     Socioeconomic History   • Marital status:      Spouse name: delisa   • Number of children: 0   • Years of education: Not on file   • Highest education level: Master's degree (e.g., MA, MS, Kristan, MEd, MSW, MARIS)   Social Needs   • Financial resource strain: Not hard at all   • Food insecurity     Worry: Never true     Inability: Never true   • Transportation needs     Medical: No     Non-medical: No   Tobacco Use   • Smoking status: Never Smoker   • Smokeless tobacco: Never Used   Substance and Sexual Activity   • Alcohol use: Not Currently     Frequency: Never     Comment: Rare use when not pregnant   • Drug use: Never   • Sexual activity: Defer     Partners: Male     Birth control/protection: None   Lifestyle   • Physical activity     Days per week: 0 days     Minutes per session: 0 min   • Stress: Only a little           Objective   Physical Exam  Vitals signs and nursing note reviewed.   Constitutional:       Appearance: Normal appearance.   HENT:      Head: Normocephalic and atraumatic.      Right Ear: Tympanic membrane normal.      Left Ear: Tympanic membrane normal.      Nose: Nose normal.      Mouth/Throat:      Mouth: Mucous membranes are moist.      Pharynx: Oropharynx is clear.   Eyes:      Extraocular Movements: Extraocular movements intact.      Conjunctiva/sclera: Conjunctivae normal.      Pupils: Pupils are equal, round, and reactive to light.   Neck:      Musculoskeletal: Normal range of motion and neck supple.   Cardiovascular:      Rate and Rhythm: Normal rate and regular rhythm.  No extrasystoles are present.     Pulses: Normal pulses.      Heart sounds: Normal heart sounds. No murmur.      Comments: Regular rate and rhythm.  No pedal edema to lower extremities.  No ectopy.  No murmur.  Pulmonary:      Effort: Pulmonary effort is normal.      Breath sounds: Normal breath sounds.      Comments:  Lungs are clear to auscultation bilaterally.  No pleuritic type chest pain.  No chest wall tenderness.  Chest:      Chest wall: No tenderness or crepitus.   Abdominal:      General: Bowel sounds are normal. There is no distension.      Palpations: Abdomen is soft.      Tenderness: There is abdominal tenderness in the right upper quadrant, epigastric area and left upper quadrant. There is no right CVA tenderness, left CVA tenderness, guarding or rebound. Negative signs include Zurita's sign, Rovsing's sign, McBurney's sign, psoas sign and obturator sign.      Comments: Moderate epigastric and left upper quadrant tenderness.  No rebound or guarding.  Mild right upper quadrant tenderness.  Negative Zurita sign.  Abdomen is soft without distention.  Active bowel sounds in all 4 quadrants.  No flank or CVA tenderness.  Abdominal exam is benign.   Musculoskeletal: Normal range of motion.   Skin:     General: Skin is warm and dry.   Neurological:      General: No focal deficit present.      Mental Status: She is alert.         Procedures           ED Course  ED Course as of Nov 24 0538 Tue Nov 24, 2020   3237 EKG shows normal sinus rhythm.  There is no evidence of ectopy, arrhythmia, or ST-T wave changes consistent with ischemia.  Chest x-ray shows no acute cardiopulmonary process.  CBC and chemistries were completely within normal limits.  Lipase was 56.  Urinalysis revealed no acute infectious process.    [FC]   0455 I gave patient IV Protonix 40 mg x 1 dose.  I reevaluated her.  Symptoms were starting to improve upon her initial assessment.  She said that she is feeling about the same with some upper abdominal discomfort.  Discussed the case with Dr. Caldwell, ER attending physician.  He recommended CT scan of the abdomen and pelvis without contrast for further evaluation.  I also ordered a dose of simethicone 80 mg by mouth.  Abdominal reexamination is still benign.    [FC]   0514 CT of the abdomen and pelvis without  contrast reveals no acute abdominal or pelvic findings.  Normal appendix.  Equivocal small amount of hyperdense material within the gallbladder, raising possibility of gallbladder sludge.  Abdominal exam is benign nonsurgical.  Recommend low-fat GI diet on discharge.  Recommend omeprazole 20 mg by mouth twice daily Rx on discharge.  No eating after 6 PM.  Keep head of bed elevated.  Recommend close PCP follow-up for recheck and outpatient gallbladder ultrasound for further assessment.  I will update the patient.  Abdominal exam and vital signs are stable.  She is ready for discharge to home.    [FC]   0529 Upon discharge, patient says that she is still having discomfort.  I advised that we can give short course of Percocet as needed for pain, but that it is recommended that she pump and dump with breast-feeding if taking these medications.  She said that she will try to defer on them, but if severe pain, she will utilize them.    [FC]   0531 SHIRLEY query complete. Treatment plan to include limited course of prescribed  controlled substance. Risks including addiction, benefits, and alternatives presented to patient.       [AP]      ED Course User Index  [AP] Chester Caldwell DO  [FC] Treva Kingsley, KURT            Recent Results (from the past 24 hour(s))   Comprehensive Metabolic Panel    Collection Time: 11/24/20  2:20 AM    Specimen: Blood   Result Value Ref Range    Glucose 115 (H) 65 - 99 mg/dL    BUN 24 (H) 6 - 20 mg/dL    Creatinine 0.71 0.57 - 1.00 mg/dL    Sodium 137 136 - 145 mmol/L    Potassium 3.5 3.5 - 5.2 mmol/L    Chloride 103 98 - 107 mmol/L    CO2 23.0 22.0 - 29.0 mmol/L    Calcium 9.2 8.6 - 10.5 mg/dL    Total Protein 6.9 6.0 - 8.5 g/dL    Albumin 4.30 3.50 - 5.20 g/dL    ALT (SGPT) 19 1 - 33 U/L    AST (SGOT) 38 (H) 1 - 32 U/L    Alkaline Phosphatase 106 39 - 117 U/L    Total Bilirubin 0.4 0.0 - 1.2 mg/dL    eGFR Non African Amer 97 >60 mL/min/1.73    Globulin 2.6 gm/dL    A/G Ratio 1.7 g/dL     BUN/Creatinine Ratio 33.8 (H) 7.0 - 25.0    Anion Gap 11.0 5.0 - 15.0 mmol/L   Lipase    Collection Time: 11/24/20  2:20 AM    Specimen: Blood   Result Value Ref Range    Lipase 56 13 - 60 U/L   Light Blue Top    Collection Time: 11/24/20  2:20 AM   Result Value Ref Range    Extra Tube hold for add-on    Green Top (Gel)    Collection Time: 11/24/20  2:20 AM   Result Value Ref Range    Extra Tube Hold for add-ons.    Lavender Top    Collection Time: 11/24/20  2:20 AM   Result Value Ref Range    Extra Tube hold for add-on    Gold Top - SST    Collection Time: 11/24/20  2:20 AM   Result Value Ref Range    Extra Tube Hold for add-ons.    CBC Auto Differential    Collection Time: 11/24/20  2:20 AM    Specimen: Blood   Result Value Ref Range    WBC 5.99 3.40 - 10.80 10*3/mm3    RBC 3.66 (L) 3.77 - 5.28 10*6/mm3    Hemoglobin 10.8 (L) 12.0 - 15.9 g/dL    Hematocrit 33.6 (L) 34.0 - 46.6 %    MCV 91.8 79.0 - 97.0 fL    MCH 29.5 26.6 - 33.0 pg    MCHC 32.1 31.5 - 35.7 g/dL    RDW 12.6 12.3 - 15.4 %    RDW-SD 42.3 37.0 - 54.0 fl    MPV 10.7 6.0 - 12.0 fL    Platelets 258 140 - 450 10*3/mm3    Neutrophil % 55.0 42.7 - 76.0 %    Lymphocyte % 36.6 19.6 - 45.3 %    Monocyte % 6.2 5.0 - 12.0 %    Eosinophil % 1.8 0.3 - 6.2 %    Basophil % 0.2 0.0 - 1.5 %    Immature Grans % 0.2 0.0 - 0.5 %    Neutrophils, Absolute 3.30 1.70 - 7.00 10*3/mm3    Lymphocytes, Absolute 2.19 0.70 - 3.10 10*3/mm3    Monocytes, Absolute 0.37 0.10 - 0.90 10*3/mm3    Eosinophils, Absolute 0.11 0.00 - 0.40 10*3/mm3    Basophils, Absolute 0.01 0.00 - 0.20 10*3/mm3    Immature Grans, Absolute 0.01 0.00 - 0.05 10*3/mm3    nRBC 0.0 0.0 - 0.2 /100 WBC   Urinalysis With Microscopic If Indicated (No Culture) - Urine, Clean Catch    Collection Time: 11/24/20  3:16 AM    Specimen: Urine, Clean Catch   Result Value Ref Range    Color, UA Yellow Yellow, Straw    Appearance, UA Clear Clear    pH, UA 7.0 5.0 - 8.0    Specific Gravity, UA 1.028 1.001 - 1.030    Glucose,  UA Negative Negative    Ketones, UA Negative Negative    Bilirubin, UA Negative Negative    Blood, UA Negative Negative    Protein, UA Negative Negative    Leuk Esterase, UA Small (1+) (A) Negative    Nitrite, UA Negative Negative    Urobilinogen, UA 1.0 E.U./dL 0.2 - 1.0 E.U./dL   Urinalysis, Microscopic Only - Urine, Clean Catch    Collection Time: 11/24/20  3:16 AM    Specimen: Urine, Clean Catch   Result Value Ref Range    RBC, UA 0-2 None Seen, 0-2 /HPF    WBC, UA 3-5 (A) None Seen, 0-2 /HPF    Bacteria, UA None Seen None Seen, Trace /HPF    Squamous Epithelial Cells, UA 0-2 None Seen, 0-2 /HPF    Hyaline Casts, UA 0-6 0 - 6 /LPF    Methodology Automated Microscopy      Note: In addition to lab results from this visit, the labs listed above may include labs taken at another facility or during a different encounter within the last 24 hours. Please correlate lab times with ED admission and discharge times for further clarification of the services performed during this visit.    CT Abdomen Pelvis Without Contrast   Final Result      1. No acute abdominal or pelvic findings.   2. Normal appendix.   3. Equivocal small amount of hyperdense material within the gallbladder, raising the possibility of gallbladder sludge.               Signer Name: Lawrence Argueta MD    Signed: 11/24/2020 5:09 AM    Workstation Name: [facility]     Radiology [facility]      XR Chest 1 View   Final Result   No acute cardiopulmonary findings.      Signer Name: Lawrence Argueta MD    Signed: 11/24/2020 3:22 AM    Workstation Name: [facility]     Radiology [facility]        Vitals:    11/24/20 0430 11/24/20 0500 11/24/20 0501 11/24/20 0522   BP: 125/76 127/88     BP Location:       Patient Position:       Pulse:   79    Resp:    16   Temp:       TempSrc:       SpO2:  91% 99%    Weight:       Height:         Medications   Sodium Chloride (PF) 0.9 % 10 mL (has no administration in time range)   pantoprazole  (PROTONIX) injection 40 mg (40 mg Intravenous Given 11/24/20 0309)   simethicone (MYLICON) chewable tablet 80 mg (80 mg Oral Given 11/24/20 9753)     ECG/EMG Results (last 24 hours)     Procedure Component Value Units Date/Time    ECG 12 Lead [[id number]] Collected: 11/24/20 0307     Updated: 11/24/20 0305        ECG 12 Lead                   SHIRLEY query complete. Treatment plan to include limited course of prescribed  controlled substance. Risks including addiction, benefits, and alternatives presented to patient.                              MDM    Final diagnoses:   Bilateral upper abdominal discomfort   Sludge in gallbladder   Gestational hypertension, antepartum   Breastfeeding (infant)   Biliary colic            Treva Kingsley PA-C  11/24/20 0520       Treva Kingsley PA-C  11/24/20 0531       Treva Kingsley PA-C  11/24/20 0539

## 2020-11-24 NOTE — TELEPHONE ENCOUNTER
Patient was seen in the ED last night and was told to call this morning for a follow-up appointment with you.  Please review and advise.

## 2020-11-24 NOTE — TELEPHONE ENCOUNTER
DR. YOUNG PT.  PT IS CALLING TODAY TO LET  KNOW SHE WENT TO THE  ER 11/23/20. THEY TOLD HER SHE MAY HAVE A GALLBLADDER ISSUE. THEY GAVE HER PERCOET TO TAKE FOR PAIN. SHE IS BREAST FEEDING AND WANTS TO KNOW IF OK TO THIS MEDICATION. SHE IS IN A LOT OF PAIN THIS AM.

## 2020-11-24 NOTE — TELEPHONE ENCOUNTER
I placed the order for the ultrasound.  Reminder to call central scheduling to help coordinate it.  Hopefully they can get it done ASAP

## 2020-11-24 NOTE — DISCHARGE INSTRUCTIONS
ER evaluation reveals normal EKG, normal chest x-ray, and normal labs, including CBC, chemistries, and lipase.  CT scan of the abdomen and pelvis without contrast reveals no acute infectious or inflammatory process.  There appears to be some hyperdense material within the gallbladder, which could be suggestive of gallbladder sludge.  Recommend low-fat diet on discharge.  Avoid greasy, spicy, or fatty foods.  Avoid eating after 6 PM.  Recommend close GI follow-up with Dr. Phan for right upper quadrant ultrasound.  Rx for omeprazole 20 mg by mouth twice daily dispense 40 no refills.  Rx for Percocet 5 mg by mouth every 4-6 hours as needed for moderate pain dispense 8 no refills and Zofran 4 mg ODT every 4-6 hours as needed for nausea dispense 12 no refills.  Patient says she wants to use just in case for severe pain.  Advised for patient to pump and dump with breast-feeding if using these medications.  She verbalized understanding.  Avoid nonsteroidal anti-inflammatory agents.  Return to the ER sooner if any worsening symptoms.

## 2020-11-24 NOTE — TELEPHONE ENCOUNTER
Percocet would be fine.  If she does have a gallbladder issue the importance of minimizing fat intake cannot be over emphasized.  With a planning on getting her set up for a gallbladder ultrasound or a referral to general surgeons for evaluation?

## 2020-11-25 ENCOUNTER — APPOINTMENT (OUTPATIENT)
Dept: ULTRASOUND IMAGING | Facility: HOSPITAL | Age: 30
End: 2020-11-25

## 2020-11-25 ENCOUNTER — ANESTHESIA (OUTPATIENT)
Dept: PERIOP | Facility: HOSPITAL | Age: 30
End: 2020-11-25

## 2020-11-25 ENCOUNTER — APPOINTMENT (OUTPATIENT)
Dept: MRI IMAGING | Facility: HOSPITAL | Age: 30
End: 2020-11-25

## 2020-11-25 ENCOUNTER — APPOINTMENT (OUTPATIENT)
Dept: GENERAL RADIOLOGY | Facility: HOSPITAL | Age: 30
End: 2020-11-25

## 2020-11-25 ENCOUNTER — ANESTHESIA EVENT (OUTPATIENT)
Dept: PERIOP | Facility: HOSPITAL | Age: 30
End: 2020-11-25

## 2020-11-25 ENCOUNTER — HOSPITAL ENCOUNTER (INPATIENT)
Facility: HOSPITAL | Age: 30
LOS: 1 days | Discharge: HOME | End: 2020-11-26
Attending: EMERGENCY MEDICINE | Admitting: INTERNAL MEDICINE

## 2020-11-25 DIAGNOSIS — K85.10 GALLSTONE PANCREATITIS: Primary | ICD-10-CM

## 2020-11-25 DIAGNOSIS — R11.2 NON-INTRACTABLE VOMITING WITH NAUSEA, UNSPECIFIED VOMITING TYPE: ICD-10-CM

## 2020-11-25 DIAGNOSIS — R74.8 ELEVATED LIPASE: ICD-10-CM

## 2020-11-25 DIAGNOSIS — R79.89 ELEVATED LFTS: ICD-10-CM

## 2020-11-25 DIAGNOSIS — O13.3 GESTATIONAL HYPERTENSION, THIRD TRIMESTER: ICD-10-CM

## 2020-11-25 DIAGNOSIS — N39.0 ACUTE UTI: ICD-10-CM

## 2020-11-25 LAB
ALBUMIN SERPL-MCNC: 4.2 G/DL (ref 3.5–5.2)
ALBUMIN SERPL-MCNC: 4.2 G/DL (ref 3.5–5.2)
ALBUMIN/GLOB SERPL: 1.6 G/DL
ALBUMIN/GLOB SERPL: 2 G/DL
ALP SERPL-CCNC: 297 U/L (ref 39–117)
ALP SERPL-CCNC: 305 U/L (ref 39–117)
ALT SERPL W P-5'-P-CCNC: 407 U/L (ref 1–33)
ALT SERPL W P-5'-P-CCNC: 458 U/L (ref 1–33)
ANION GAP SERPL CALCULATED.3IONS-SCNC: 10 MMOL/L (ref 5–15)
ANION GAP SERPL CALCULATED.3IONS-SCNC: 12 MMOL/L (ref 5–15)
AST SERPL-CCNC: 366 U/L (ref 1–32)
AST SERPL-CCNC: 582 U/L (ref 1–32)
BACTERIA UR QL AUTO: ABNORMAL /HPF
BASOPHILS # BLD AUTO: 0.01 10*3/MM3 (ref 0–0.2)
BASOPHILS # BLD AUTO: 0.02 10*3/MM3 (ref 0–0.2)
BASOPHILS NFR BLD AUTO: 0.1 % (ref 0–1.5)
BASOPHILS NFR BLD AUTO: 0.2 % (ref 0–1.5)
BILIRUB SERPL-MCNC: 1.6 MG/DL (ref 0–1.2)
BILIRUB SERPL-MCNC: 2.7 MG/DL (ref 0–1.2)
BILIRUB UR QL STRIP: ABNORMAL
BUN SERPL-MCNC: 11 MG/DL (ref 6–20)
BUN SERPL-MCNC: 14 MG/DL (ref 6–20)
BUN/CREAT SERPL: 14.3 (ref 7–25)
BUN/CREAT SERPL: 19.4 (ref 7–25)
CALCIUM SPEC-SCNC: 8.8 MG/DL (ref 8.6–10.5)
CALCIUM SPEC-SCNC: 8.9 MG/DL (ref 8.6–10.5)
CHLORIDE SERPL-SCNC: 106 MMOL/L (ref 98–107)
CHLORIDE SERPL-SCNC: 107 MMOL/L (ref 98–107)
CHOLEST SERPL-MCNC: 172 MG/DL (ref 0–200)
CLARITY UR: CLEAR
CO2 SERPL-SCNC: 22 MMOL/L (ref 22–29)
CO2 SERPL-SCNC: 24 MMOL/L (ref 22–29)
COLOR UR: YELLOW
CREAT SERPL-MCNC: 0.72 MG/DL (ref 0.57–1)
CREAT SERPL-MCNC: 0.77 MG/DL (ref 0.57–1)
D-LACTATE SERPL-SCNC: 0.9 MMOL/L (ref 0.5–2)
DEPRECATED RDW RBC AUTO: 43.3 FL (ref 37–54)
DEPRECATED RDW RBC AUTO: 44.5 FL (ref 37–54)
EOSINOPHIL # BLD AUTO: 0.09 10*3/MM3 (ref 0–0.4)
EOSINOPHIL # BLD AUTO: 0.1 10*3/MM3 (ref 0–0.4)
EOSINOPHIL NFR BLD AUTO: 1.1 % (ref 0.3–6.2)
EOSINOPHIL NFR BLD AUTO: 1.5 % (ref 0.3–6.2)
ERYTHROCYTE [DISTWIDTH] IN BLOOD BY AUTOMATED COUNT: 12.8 % (ref 12.3–15.4)
ERYTHROCYTE [DISTWIDTH] IN BLOOD BY AUTOMATED COUNT: 12.9 % (ref 12.3–15.4)
GFR SERPL CREATININE-BSD FRML MDRD: 89 ML/MIN/1.73
GFR SERPL CREATININE-BSD FRML MDRD: 96 ML/MIN/1.73
GLOBULIN UR ELPH-MCNC: 2.1 GM/DL
GLOBULIN UR ELPH-MCNC: 2.6 GM/DL
GLUCOSE SERPL-MCNC: 126 MG/DL (ref 65–99)
GLUCOSE SERPL-MCNC: 172 MG/DL (ref 65–99)
GLUCOSE UR STRIP-MCNC: NEGATIVE MG/DL
HCT VFR BLD AUTO: 33 % (ref 34–46.6)
HCT VFR BLD AUTO: 33.7 % (ref 34–46.6)
HDLC SERPL-MCNC: 85 MG/DL (ref 40–60)
HGB BLD-MCNC: 10.3 G/DL (ref 12–15.9)
HGB BLD-MCNC: 10.7 G/DL (ref 12–15.9)
HGB UR QL STRIP.AUTO: ABNORMAL
HOLD SPECIMEN: NORMAL
HOLD SPECIMEN: NORMAL
IMM GRANULOCYTES # BLD AUTO: 0.01 10*3/MM3 (ref 0–0.05)
IMM GRANULOCYTES # BLD AUTO: 0.02 10*3/MM3 (ref 0–0.05)
IMM GRANULOCYTES NFR BLD AUTO: 0.1 % (ref 0–0.5)
IMM GRANULOCYTES NFR BLD AUTO: 0.3 % (ref 0–0.5)
KETONES UR QL STRIP: ABNORMAL
LDLC SERPL CALC-MCNC: 72 MG/DL (ref 0–100)
LDLC/HDLC SERPL: 0.82 {RATIO}
LEUKOCYTE ESTERASE UR QL STRIP.AUTO: NEGATIVE
LIPASE SERPL-CCNC: >3000 U/L (ref 13–60)
LYMPHOCYTES # BLD AUTO: 0.56 10*3/MM3 (ref 0.7–3.1)
LYMPHOCYTES # BLD AUTO: 1.06 10*3/MM3 (ref 0.7–3.1)
LYMPHOCYTES NFR BLD AUTO: 13.1 % (ref 19.6–45.3)
LYMPHOCYTES NFR BLD AUTO: 8.2 % (ref 19.6–45.3)
MCH RBC QN AUTO: 29.2 PG (ref 26.6–33)
MCH RBC QN AUTO: 29.3 PG (ref 26.6–33)
MCHC RBC AUTO-ENTMCNC: 31.2 G/DL (ref 31.5–35.7)
MCHC RBC AUTO-ENTMCNC: 31.8 G/DL (ref 31.5–35.7)
MCV RBC AUTO: 92.3 FL (ref 79–97)
MCV RBC AUTO: 93.5 FL (ref 79–97)
MONOCYTES # BLD AUTO: 0.26 10*3/MM3 (ref 0.1–0.9)
MONOCYTES # BLD AUTO: 0.63 10*3/MM3 (ref 0.1–0.9)
MONOCYTES NFR BLD AUTO: 3.8 % (ref 5–12)
MONOCYTES NFR BLD AUTO: 7.8 % (ref 5–12)
NEUTROPHILS NFR BLD AUTO: 5.88 10*3/MM3 (ref 1.7–7)
NEUTROPHILS NFR BLD AUTO: 6.28 10*3/MM3 (ref 1.7–7)
NEUTROPHILS NFR BLD AUTO: 77.7 % (ref 42.7–76)
NEUTROPHILS NFR BLD AUTO: 86.1 % (ref 42.7–76)
NITRITE UR QL STRIP: POSITIVE
NRBC BLD AUTO-RTO: 0 /100 WBC (ref 0–0.2)
NRBC BLD AUTO-RTO: 0 /100 WBC (ref 0–0.2)
PH UR STRIP.AUTO: <=5 [PH] (ref 5–8)
PLAT MORPH BLD: NORMAL
PLATELET # BLD AUTO: 255 10*3/MM3 (ref 140–450)
PLATELET # BLD AUTO: 272 10*3/MM3 (ref 140–450)
PMV BLD AUTO: 10.9 FL (ref 6–12)
PMV BLD AUTO: 11.1 FL (ref 6–12)
POTASSIUM SERPL-SCNC: 3.4 MMOL/L (ref 3.5–5.2)
POTASSIUM SERPL-SCNC: 4.5 MMOL/L (ref 3.5–5.2)
PROT SERPL-MCNC: 6.3 G/DL (ref 6–8.5)
PROT SERPL-MCNC: 6.8 G/DL (ref 6–8.5)
PROT UR QL STRIP: ABNORMAL
RBC # BLD AUTO: 3.53 10*6/MM3 (ref 3.77–5.28)
RBC # BLD AUTO: 3.65 10*6/MM3 (ref 3.77–5.28)
RBC # UR: ABNORMAL /HPF
RBC MORPH BLD: NORMAL
REF LAB TEST METHOD: ABNORMAL
SARS-COV-2 RNA RESP QL NAA+PROBE: NOT DETECTED
SODIUM SERPL-SCNC: 140 MMOL/L (ref 136–145)
SODIUM SERPL-SCNC: 141 MMOL/L (ref 136–145)
SP GR UR STRIP: >=1.03 (ref 1–1.03)
SQUAMOUS #/AREA URNS HPF: ABNORMAL /[HPF]
TRIGL SERPL-MCNC: 85 MG/DL (ref 0–150)
UROBILINOGEN UR QL STRIP: ABNORMAL
VLDLC SERPL-MCNC: 15 MG/DL (ref 5–40)
WBC # BLD AUTO: 6.83 10*3/MM3 (ref 3.4–10.8)
WBC # BLD AUTO: 8.09 10*3/MM3 (ref 3.4–10.8)
WBC MORPH BLD: NORMAL
WBC UR QL AUTO: ABNORMAL /HPF
WHOLE BLOOD HOLD SPECIMEN: NORMAL
WHOLE BLOOD HOLD SPECIMEN: NORMAL

## 2020-11-25 PROCEDURE — 76000 FLUOROSCOPY <1 HR PHYS/QHP: CPT

## 2020-11-25 PROCEDURE — 25010000002 DEXAMETHASONE PER 1 MG: Performed by: NURSE ANESTHETIST, CERTIFIED REGISTERED

## 2020-11-25 PROCEDURE — 81001 URINALYSIS AUTO W/SCOPE: CPT | Performed by: EMERGENCY MEDICINE

## 2020-11-25 PROCEDURE — G0378 HOSPITAL OBSERVATION PER HR: HCPCS

## 2020-11-25 PROCEDURE — 25010000002 PIPERACILLIN SOD-TAZOBACTAM PER 1 G: Performed by: PHYSICIAN ASSISTANT

## 2020-11-25 PROCEDURE — 99223 1ST HOSP IP/OBS HIGH 75: CPT | Performed by: INTERNAL MEDICINE

## 2020-11-25 PROCEDURE — 25010000002 ONDANSETRON PER 1 MG: Performed by: EMERGENCY MEDICINE

## 2020-11-25 PROCEDURE — 74181 MRI ABDOMEN W/O CONTRAST: CPT

## 2020-11-25 PROCEDURE — 80061 LIPID PANEL: CPT | Performed by: INTERNAL MEDICINE

## 2020-11-25 PROCEDURE — 80053 COMPREHEN METABOLIC PANEL: CPT | Performed by: NURSE PRACTITIONER

## 2020-11-25 PROCEDURE — 25010000002 HYDROMORPHONE PER 4 MG: Performed by: SURGERY

## 2020-11-25 PROCEDURE — 25010000002 HYDROMORPHONE 1 MG/ML SOLUTION: Performed by: SURGERY

## 2020-11-25 PROCEDURE — 25010000002 PIPERACILLIN SOD-TAZOBACTAM PER 1 G: Performed by: SURGERY

## 2020-11-25 PROCEDURE — 85025 COMPLETE CBC W/AUTO DIFF WBC: CPT

## 2020-11-25 PROCEDURE — 25010000002 NEOSTIGMINE 10 MG/10ML SOLUTION: Performed by: ANESTHESIOLOGY

## 2020-11-25 PROCEDURE — 80053 COMPREHEN METABOLIC PANEL: CPT | Performed by: EMERGENCY MEDICINE

## 2020-11-25 PROCEDURE — 25010000002 FENTANYL CITRATE (PF) 100 MCG/2ML SOLUTION: Performed by: NURSE ANESTHETIST, CERTIFIED REGISTERED

## 2020-11-25 PROCEDURE — 25010000002 HYDROMORPHONE 1 MG/ML SOLUTION: Performed by: INTERNAL MEDICINE

## 2020-11-25 PROCEDURE — 25010000002 LORAZEPAM PER 2 MG

## 2020-11-25 PROCEDURE — 25010000002 HYDROMORPHONE PER 4 MG: Performed by: INTERNAL MEDICINE

## 2020-11-25 PROCEDURE — 25010000002 MORPHINE PER 10 MG: Performed by: EMERGENCY MEDICINE

## 2020-11-25 PROCEDURE — 87635 SARS-COV-2 COVID-19 AMP PRB: CPT | Performed by: PHYSICIAN ASSISTANT

## 2020-11-25 PROCEDURE — 85025 COMPLETE CBC W/AUTO DIFF WBC: CPT | Performed by: NURSE PRACTITIONER

## 2020-11-25 PROCEDURE — 25010000002 PROPOFOL 10 MG/ML EMULSION: Performed by: NURSE ANESTHETIST, CERTIFIED REGISTERED

## 2020-11-25 PROCEDURE — 25010000002 ONDANSETRON PER 1 MG: Performed by: SURGERY

## 2020-11-25 PROCEDURE — 76705 ECHO EXAM OF ABDOMEN: CPT

## 2020-11-25 PROCEDURE — 88304 TISSUE EXAM BY PATHOLOGIST: CPT | Performed by: SURGERY

## 2020-11-25 PROCEDURE — 83605 ASSAY OF LACTIC ACID: CPT | Performed by: EMERGENCY MEDICINE

## 2020-11-25 PROCEDURE — 0FT44ZZ RESECTION OF GALLBLADDER, PERCUTANEOUS ENDOSCOPIC APPROACH: ICD-10-PCS | Performed by: SURGERY

## 2020-11-25 PROCEDURE — 83690 ASSAY OF LIPASE: CPT | Performed by: EMERGENCY MEDICINE

## 2020-11-25 PROCEDURE — 25010000002 ONDANSETRON PER 1 MG: Performed by: NURSE PRACTITIONER

## 2020-11-25 PROCEDURE — 25010000002 HYDROMORPHONE PER 4 MG: Performed by: EMERGENCY MEDICINE

## 2020-11-25 PROCEDURE — 25010000003 LIDOCAINE 1 % SOLUTION: Performed by: NURSE ANESTHETIST, CERTIFIED REGISTERED

## 2020-11-25 PROCEDURE — BF131ZZ FLUOROSCOPY OF GALLBLADDER AND BILE DUCTS USING LOW OSMOLAR CONTRAST: ICD-10-PCS | Performed by: SURGERY

## 2020-11-25 PROCEDURE — 25010000002 IOPAMIDOL 61 % SOLUTION: Performed by: SURGERY

## 2020-11-25 PROCEDURE — 25010000002 PIPERACILLIN SOD-TAZOBACTAM PER 1 G: Performed by: NURSE PRACTITIONER

## 2020-11-25 PROCEDURE — 85007 BL SMEAR W/DIFF WBC COUNT: CPT | Performed by: NURSE PRACTITIONER

## 2020-11-25 DEVICE — LIGAMAX 5 MM ENDOSCOPIC MULTIPLE CLIP APPLIER
Type: IMPLANTABLE DEVICE | Site: ABDOMEN | Status: FUNCTIONAL
Brand: LIGAMAX

## 2020-11-25 RX ORDER — NALOXONE HCL 0.4 MG/ML
0.4 VIAL (ML) INJECTION
Status: DISCONTINUED | OUTPATIENT
Start: 2020-11-25 | End: 2020-11-25

## 2020-11-25 RX ORDER — HYDROMORPHONE HYDROCHLORIDE 1 MG/ML
0.5 INJECTION, SOLUTION INTRAMUSCULAR; INTRAVENOUS; SUBCUTANEOUS ONCE
Status: COMPLETED | OUTPATIENT
Start: 2020-11-25 | End: 2020-11-25

## 2020-11-25 RX ORDER — NALOXONE HCL 0.4 MG/ML
0.4 VIAL (ML) INJECTION
Status: DISCONTINUED | OUTPATIENT
Start: 2020-11-25 | End: 2020-11-26 | Stop reason: HOSPADM

## 2020-11-25 RX ORDER — PROMETHAZINE HYDROCHLORIDE 25 MG/1
25 SUPPOSITORY RECTAL ONCE AS NEEDED
Status: DISCONTINUED | OUTPATIENT
Start: 2020-11-25 | End: 2020-11-25

## 2020-11-25 RX ORDER — SODIUM CHLORIDE, SODIUM LACTATE, POTASSIUM CHLORIDE, CALCIUM CHLORIDE 600; 310; 30; 20 MG/100ML; MG/100ML; MG/100ML; MG/100ML
125 INJECTION, SOLUTION INTRAVENOUS CONTINUOUS
Status: DISCONTINUED | OUTPATIENT
Start: 2020-11-25 | End: 2020-11-26 | Stop reason: HOSPADM

## 2020-11-25 RX ORDER — LIDOCAINE HYDROCHLORIDE 10 MG/ML
INJECTION, SOLUTION INFILTRATION; PERINEURAL AS NEEDED
Status: DISCONTINUED | OUTPATIENT
Start: 2020-11-25 | End: 2020-11-25 | Stop reason: SURG

## 2020-11-25 RX ORDER — SODIUM CHLORIDE 9 MG/ML
INJECTION, SOLUTION INTRAVENOUS AS NEEDED
Status: DISCONTINUED | OUTPATIENT
Start: 2020-11-25 | End: 2020-11-25 | Stop reason: HOSPADM

## 2020-11-25 RX ORDER — SODIUM CHLORIDE 0.9 % (FLUSH) 0.9 %
10 SYRINGE (ML) INJECTION AS NEEDED
Status: DISCONTINUED | OUTPATIENT
Start: 2020-11-25 | End: 2020-11-25

## 2020-11-25 RX ORDER — ONDANSETRON 2 MG/ML
4 INJECTION INTRAMUSCULAR; INTRAVENOUS ONCE
Status: COMPLETED | OUTPATIENT
Start: 2020-11-25 | End: 2020-11-25

## 2020-11-25 RX ORDER — GLYCOPYRROLATE 0.2 MG/ML
INJECTION INTRAMUSCULAR; INTRAVENOUS AS NEEDED
Status: DISCONTINUED | OUTPATIENT
Start: 2020-11-25 | End: 2020-11-25 | Stop reason: SURG

## 2020-11-25 RX ORDER — NALOXONE HCL 0.4 MG/ML
0.1 VIAL (ML) INJECTION
Status: DISCONTINUED | OUTPATIENT
Start: 2020-11-25 | End: 2020-11-26 | Stop reason: HOSPADM

## 2020-11-25 RX ORDER — ONDANSETRON 2 MG/ML
4 INJECTION INTRAMUSCULAR; INTRAVENOUS EVERY 6 HOURS PRN
Status: DISCONTINUED | OUTPATIENT
Start: 2020-11-25 | End: 2020-11-25 | Stop reason: SDUPTHER

## 2020-11-25 RX ORDER — MORPHINE SULFATE 1 MG/ML
2 INJECTION, SOLUTION EPIDURAL; INTRATHECAL; INTRAVENOUS
Status: DISCONTINUED | OUTPATIENT
Start: 2020-11-25 | End: 2020-11-26 | Stop reason: HOSPADM

## 2020-11-25 RX ORDER — LORAZEPAM 2 MG/ML
INJECTION INTRAMUSCULAR
Status: COMPLETED
Start: 2020-11-25 | End: 2020-11-25

## 2020-11-25 RX ORDER — LORAZEPAM 2 MG/ML
1 INJECTION INTRAMUSCULAR ONCE
Status: COMPLETED | OUTPATIENT
Start: 2020-11-25 | End: 2020-11-25

## 2020-11-25 RX ORDER — BACLOFEN 10 MG/1
5 TABLET ORAL ONCE
Status: COMPLETED | OUTPATIENT
Start: 2020-11-25 | End: 2020-11-25

## 2020-11-25 RX ORDER — PROMETHAZINE HYDROCHLORIDE 25 MG/1
25 TABLET ORAL ONCE AS NEEDED
Status: DISCONTINUED | OUTPATIENT
Start: 2020-11-25 | End: 2020-11-25

## 2020-11-25 RX ORDER — ONDANSETRON 4 MG/1
4 TABLET, FILM COATED ORAL EVERY 6 HOURS PRN
Status: DISCONTINUED | OUTPATIENT
Start: 2020-11-25 | End: 2020-11-26 | Stop reason: HOSPADM

## 2020-11-25 RX ORDER — ONDANSETRON 2 MG/ML
4 INJECTION INTRAMUSCULAR; INTRAVENOUS EVERY 6 HOURS PRN
Status: DISCONTINUED | OUTPATIENT
Start: 2020-11-25 | End: 2020-11-26 | Stop reason: HOSPADM

## 2020-11-25 RX ORDER — FAMOTIDINE 10 MG/ML
20 INJECTION, SOLUTION INTRAVENOUS
Status: COMPLETED | OUTPATIENT
Start: 2020-11-25 | End: 2020-11-25

## 2020-11-25 RX ORDER — MEPERIDINE HYDROCHLORIDE 25 MG/ML
12.5 INJECTION INTRAMUSCULAR; INTRAVENOUS; SUBCUTANEOUS
Status: DISCONTINUED | OUTPATIENT
Start: 2020-11-25 | End: 2020-11-25

## 2020-11-25 RX ORDER — DEXAMETHASONE SODIUM PHOSPHATE 4 MG/ML
INJECTION, SOLUTION INTRA-ARTICULAR; INTRALESIONAL; INTRAMUSCULAR; INTRAVENOUS; SOFT TISSUE AS NEEDED
Status: DISCONTINUED | OUTPATIENT
Start: 2020-11-25 | End: 2020-11-25 | Stop reason: SURG

## 2020-11-25 RX ORDER — NEOSTIGMINE METHYLSULFATE 1 MG/ML
INJECTION, SOLUTION INTRAVENOUS AS NEEDED
Status: DISCONTINUED | OUTPATIENT
Start: 2020-11-25 | End: 2020-11-25 | Stop reason: SURG

## 2020-11-25 RX ORDER — LIDOCAINE HYDROCHLORIDE 10 MG/ML
0.5 INJECTION, SOLUTION EPIDURAL; INFILTRATION; INTRACAUDAL; PERINEURAL ONCE AS NEEDED
Status: DISCONTINUED | OUTPATIENT
Start: 2020-11-25 | End: 2020-11-25

## 2020-11-25 RX ORDER — FENTANYL CITRATE 50 UG/ML
INJECTION, SOLUTION INTRAMUSCULAR; INTRAVENOUS AS NEEDED
Status: DISCONTINUED | OUTPATIENT
Start: 2020-11-25 | End: 2020-11-25 | Stop reason: SURG

## 2020-11-25 RX ORDER — FAMOTIDINE 20 MG/1
20 TABLET, FILM COATED ORAL
Status: COMPLETED | OUTPATIENT
Start: 2020-11-25 | End: 2020-11-25

## 2020-11-25 RX ORDER — BUPIVACAINE HYDROCHLORIDE AND EPINEPHRINE 2.5; 5 MG/ML; UG/ML
INJECTION, SOLUTION EPIDURAL; INFILTRATION; INTRACAUDAL; PERINEURAL AS NEEDED
Status: DISCONTINUED | OUTPATIENT
Start: 2020-11-25 | End: 2020-11-25 | Stop reason: HOSPADM

## 2020-11-25 RX ORDER — SODIUM CHLORIDE 9 MG/ML
200 INJECTION, SOLUTION INTRAVENOUS CONTINUOUS
Status: DISCONTINUED | OUTPATIENT
Start: 2020-11-25 | End: 2020-11-25

## 2020-11-25 RX ORDER — ONDANSETRON 4 MG/1
4 TABLET, FILM COATED ORAL EVERY 6 HOURS PRN
Status: DISCONTINUED | OUTPATIENT
Start: 2020-11-25 | End: 2020-11-25 | Stop reason: SDUPTHER

## 2020-11-25 RX ORDER — SODIUM CHLORIDE 0.9 % (FLUSH) 0.9 %
10 SYRINGE (ML) INJECTION EVERY 12 HOURS SCHEDULED
Status: DISCONTINUED | OUTPATIENT
Start: 2020-11-25 | End: 2020-11-25

## 2020-11-25 RX ORDER — OXYCODONE HYDROCHLORIDE AND ACETAMINOPHEN 5; 325 MG/1; MG/1
2 TABLET ORAL EVERY 4 HOURS PRN
Status: DISCONTINUED | OUTPATIENT
Start: 2020-11-25 | End: 2020-11-26 | Stop reason: HOSPADM

## 2020-11-25 RX ORDER — HYDROMORPHONE HYDROCHLORIDE 1 MG/ML
0.5 INJECTION, SOLUTION INTRAMUSCULAR; INTRAVENOUS; SUBCUTANEOUS
Status: DISCONTINUED | OUTPATIENT
Start: 2020-11-25 | End: 2020-11-25

## 2020-11-25 RX ORDER — FENTANYL CITRATE 50 UG/ML
50 INJECTION, SOLUTION INTRAMUSCULAR; INTRAVENOUS
Status: DISCONTINUED | OUTPATIENT
Start: 2020-11-25 | End: 2020-11-25

## 2020-11-25 RX ORDER — HEPARIN SODIUM 5000 [USP'U]/ML
5000 INJECTION, SOLUTION INTRAVENOUS; SUBCUTANEOUS EVERY 8 HOURS SCHEDULED
Status: DISCONTINUED | OUTPATIENT
Start: 2020-11-26 | End: 2020-11-26 | Stop reason: HOSPADM

## 2020-11-25 RX ORDER — MORPHINE SULFATE 2 MG/ML
2 INJECTION, SOLUTION INTRAMUSCULAR; INTRAVENOUS ONCE
Status: COMPLETED | OUTPATIENT
Start: 2020-11-25 | End: 2020-11-25

## 2020-11-25 RX ORDER — HYDROMORPHONE HCL 110MG/55ML
0.5 PATIENT CONTROLLED ANALGESIA SYRINGE INTRAVENOUS
Status: DISCONTINUED | OUTPATIENT
Start: 2020-11-25 | End: 2020-11-26 | Stop reason: HOSPADM

## 2020-11-25 RX ORDER — SODIUM CHLORIDE, SODIUM LACTATE, POTASSIUM CHLORIDE, CALCIUM CHLORIDE 600; 310; 30; 20 MG/100ML; MG/100ML; MG/100ML; MG/100ML
9 INJECTION, SOLUTION INTRAVENOUS CONTINUOUS PRN
Status: DISCONTINUED | OUTPATIENT
Start: 2020-11-25 | End: 2020-11-26 | Stop reason: HOSPADM

## 2020-11-25 RX ORDER — PROPOFOL 10 MG/ML
VIAL (ML) INTRAVENOUS AS NEEDED
Status: DISCONTINUED | OUTPATIENT
Start: 2020-11-25 | End: 2020-11-25 | Stop reason: SURG

## 2020-11-25 RX ORDER — ATRACURIUM BESYLATE 10 MG/ML
INJECTION, SOLUTION INTRAVENOUS AS NEEDED
Status: DISCONTINUED | OUTPATIENT
Start: 2020-11-25 | End: 2020-11-25 | Stop reason: SURG

## 2020-11-25 RX ORDER — MIDAZOLAM HYDROCHLORIDE 1 MG/ML
1 INJECTION INTRAMUSCULAR; INTRAVENOUS
Status: DISCONTINUED | OUTPATIENT
Start: 2020-11-25 | End: 2020-11-25

## 2020-11-25 RX ORDER — DOCUSATE SODIUM 100 MG/1
100 CAPSULE, LIQUID FILLED ORAL 2 TIMES DAILY PRN
Status: DISCONTINUED | OUTPATIENT
Start: 2020-11-25 | End: 2020-11-26 | Stop reason: HOSPADM

## 2020-11-25 RX ADMIN — PROPOFOL 150 MG: 10 INJECTION, EMULSION INTRAVENOUS at 16:32

## 2020-11-25 RX ADMIN — SODIUM CHLORIDE, POTASSIUM CHLORIDE, SODIUM LACTATE AND CALCIUM CHLORIDE 9 ML/HR: 600; 310; 30; 20 INJECTION, SOLUTION INTRAVENOUS at 15:26

## 2020-11-25 RX ADMIN — FAMOTIDINE 20 MG: 10 INJECTION INTRAVENOUS at 15:25

## 2020-11-25 RX ADMIN — ONDANSETRON 4 MG: 2 INJECTION INTRAMUSCULAR; INTRAVENOUS at 16:49

## 2020-11-25 RX ADMIN — FENTANYL CITRATE 100 MCG: 50 INJECTION, SOLUTION INTRAMUSCULAR; INTRAVENOUS at 16:32

## 2020-11-25 RX ADMIN — TAZOBACTAM SODIUM AND PIPERACILLIN SODIUM 3.38 G: 375; 3 INJECTION, SOLUTION INTRAVENOUS at 03:43

## 2020-11-25 RX ADMIN — BACLOFEN 5 MG: 10 TABLET ORAL at 07:03

## 2020-11-25 RX ADMIN — SODIUM CHLORIDE 200 ML/HR: 9 INJECTION, SOLUTION INTRAVENOUS at 08:35

## 2020-11-25 RX ADMIN — HYDROMORPHONE HYDROCHLORIDE 1 MG: 1 INJECTION, SOLUTION INTRAMUSCULAR; INTRAVENOUS; SUBCUTANEOUS at 08:43

## 2020-11-25 RX ADMIN — HYDROMORPHONE HYDROCHLORIDE 0.5 MG: 1 INJECTION, SOLUTION INTRAMUSCULAR; INTRAVENOUS; SUBCUTANEOUS at 07:03

## 2020-11-25 RX ADMIN — HYDROMORPHONE HYDROCHLORIDE 0.5 MG: 1 INJECTION, SOLUTION INTRAMUSCULAR; INTRAVENOUS; SUBCUTANEOUS at 02:10

## 2020-11-25 RX ADMIN — HYDROMORPHONE HYDROCHLORIDE 1 MG: 1 INJECTION, SOLUTION INTRAMUSCULAR; INTRAVENOUS; SUBCUTANEOUS at 13:38

## 2020-11-25 RX ADMIN — ONDANSETRON 4 MG: 2 INJECTION INTRAMUSCULAR; INTRAVENOUS at 02:10

## 2020-11-25 RX ADMIN — MORPHINE SULFATE 2 MG: 2 INJECTION, SOLUTION INTRAMUSCULAR; INTRAVENOUS at 03:38

## 2020-11-25 RX ADMIN — TAZOBACTAM SODIUM AND PIPERACILLIN SODIUM 3.38 G: 375; 3 INJECTION, SOLUTION INTRAVENOUS at 08:43

## 2020-11-25 RX ADMIN — TAZOBACTAM SODIUM AND PIPERACILLIN SODIUM 3.38 G: 375; 3 INJECTION, SOLUTION INTRAVENOUS at 16:38

## 2020-11-25 RX ADMIN — LIDOCAINE HYDROCHLORIDE 50 MG: 10 INJECTION, SOLUTION INFILTRATION; PERINEURAL at 16:32

## 2020-11-25 RX ADMIN — DEXAMETHASONE SODIUM PHOSPHATE 8 MG: 4 INJECTION, SOLUTION INTRAMUSCULAR; INTRAVENOUS at 16:38

## 2020-11-25 RX ADMIN — ATRACURIUM BESYLATE 40 MG: 10 INJECTION, SOLUTION INTRAVENOUS at 16:32

## 2020-11-25 RX ADMIN — GLYCOPYRROLATE 0.3 MG: 0.2 INJECTION INTRAMUSCULAR; INTRAVENOUS at 17:37

## 2020-11-25 RX ADMIN — ONDANSETRON 4 MG: 2 INJECTION INTRAMUSCULAR; INTRAVENOUS at 03:38

## 2020-11-25 RX ADMIN — SODIUM CHLORIDE 1000 ML: 9 INJECTION, SOLUTION INTRAVENOUS at 01:56

## 2020-11-25 RX ADMIN — HYDROMORPHONE HYDROCHLORIDE 0.5 MG: 2 INJECTION, SOLUTION INTRAMUSCULAR; INTRAVENOUS; SUBCUTANEOUS at 18:35

## 2020-11-25 RX ADMIN — SODIUM CHLORIDE, PRESERVATIVE FREE 10 ML: 5 INJECTION INTRAVENOUS at 15:26

## 2020-11-25 RX ADMIN — LORAZEPAM 1 MG: 2 INJECTION INTRAMUSCULAR; INTRAVENOUS at 05:55

## 2020-11-25 RX ADMIN — LORAZEPAM 1 MG: 2 INJECTION INTRAMUSCULAR at 05:55

## 2020-11-25 RX ADMIN — NEOSTIGMINE 2 MG: 1 INJECTION INTRAVENOUS at 17:37

## 2020-11-25 NOTE — ANESTHESIA PROCEDURE NOTES
Airway  Urgency: elective    Date/Time: 11/25/2020 4:33 PM  Airway not difficult    General Information and Staff    Patient location during procedure: OR  CRNA: Tasia Sarkar CRNA    Indications and Patient Condition  Indications for airway management: airway protection    Preoxygenated: yes  MILS not maintained throughout  Mask difficulty assessment: 1 - vent by mask    Final Airway Details  Final airway type: endotracheal airway      Successful airway: ETT  Cuffed: yes   Successful intubation technique: direct laryngoscopy  Endotracheal tube insertion site: oral  Blade: Jameson  Blade size: 3  ETT size (mm): 7.0  Cormack-Lehane Classification: grade I - full view of glottis  Placement verified by: chest auscultation and capnometry   Cuff volume (mL): 5  Measured from: lips  ETT/EBT  to lips (cm): 20  Number of attempts at approach: 1  Assessment: lips, teeth, and gum same as pre-op and atraumatic intubation    Additional Comments  Negative epigastric sounds, Breath sound equal bilaterally with symmetric chest rise and fall

## 2020-11-25 NOTE — ANESTHESIA POSTPROCEDURE EVALUATION
Patient: Izzy Hernandez    Procedure Summary     Date: 11/25/20 Room / Location:  [facility] /  [facility]    Anesthesia Start: 1628 Anesthesia Stop: 1755    Procedure: CHOLECYSTECTOMY LAPAROSCOPIC INTRAOPERATIVE CHOLANGIOGRAM (N/A Abdomen) Diagnosis:     Surgeon: Dennis Zhou MD Provider: Yfn Valenzuela MD    Anesthesia Type: general ASA Status: 2          Anesthesia Type: general    Vitals  Vitals Value Taken Time   /79 11/25/20 1752   Temp     Pulse 74 11/25/20 1754   Resp     SpO2 100 % 11/25/20 1754   Vitals shown include unvalidated device data.        Post Anesthesia Care and Evaluation    Patient location during evaluation: PACU  Patient participation: complete - patient participated  Level of consciousness: awake and alert  Pain management: adequate  Airway patency: patent  Anesthetic complications: No anesthetic complications  PONV Status: none  Cardiovascular status: hemodynamically stable and acceptable  Respiratory status: nonlabored ventilation, acceptable and nasal cannula  Hydration status: acceptable

## 2020-11-25 NOTE — ANESTHESIA PREPROCEDURE EVALUATION
Anesthesia Evaluation     Patient summary reviewed and Nursing notes reviewed   history of anesthetic complications:  NPO Solid Status: > 8 hours  NPO Liquid Status: > 8 hours           Airway   Mallampati: II  TM distance: >3 FB  Neck ROM: full  No difficulty expected  Dental      Pulmonary - negative pulmonary ROS and normal exam   Cardiovascular - normal exam    (+) hypertension,       Neuro/Psych- negative ROS  GI/Hepatic/Renal/Endo      Musculoskeletal     Abdominal    Substance History      OB/GYN          Other                        Anesthesia Plan    ASA 2     general     intravenous induction     Anesthetic plan, all risks, benefits, and alternatives have been provided, discussed and informed consent has been obtained with: patient.    Plan discussed with CRNA.

## 2020-11-26 VITALS
BODY MASS INDEX: 32.95 KG/M2 | RESPIRATION RATE: 16 BRPM | WEIGHT: 205 LBS | HEART RATE: 87 BPM | DIASTOLIC BLOOD PRESSURE: 80 MMHG | HEIGHT: 66 IN | TEMPERATURE: 98.2 F | OXYGEN SATURATION: 96 % | SYSTOLIC BLOOD PRESSURE: 128 MMHG

## 2020-11-26 PROBLEM — K85.10 GALLSTONE PANCREATITIS: Status: RESOLVED | Noted: 2020-11-25 | Resolved: 2020-11-26

## 2020-11-26 PROBLEM — N39.0 UTI (URINARY TRACT INFECTION): Status: RESOLVED | Noted: 2020-11-25 | Resolved: 2020-11-26

## 2020-11-26 LAB
ALBUMIN SERPL-MCNC: 3.4 G/DL (ref 3.5–5.2)
ALBUMIN/GLOB SERPL: 1.5 G/DL
ALP SERPL-CCNC: 225 U/L (ref 39–117)
ALT SERPL W P-5'-P-CCNC: 232 U/L (ref 1–33)
ANION GAP SERPL CALCULATED.3IONS-SCNC: 12 MMOL/L (ref 5–15)
AST SERPL-CCNC: 118 U/L (ref 1–32)
BASOPHILS # BLD AUTO: 0.01 10*3/MM3 (ref 0–0.2)
BASOPHILS NFR BLD AUTO: 0.1 % (ref 0–1.5)
BILIRUB SERPL-MCNC: 0.5 MG/DL (ref 0–1.2)
BUN SERPL-MCNC: 10 MG/DL (ref 6–20)
BUN/CREAT SERPL: 14.7 (ref 7–25)
CALCIUM SPEC-SCNC: 8.4 MG/DL (ref 8.6–10.5)
CHLORIDE SERPL-SCNC: 107 MMOL/L (ref 98–107)
CO2 SERPL-SCNC: 21 MMOL/L (ref 22–29)
CREAT SERPL-MCNC: 0.68 MG/DL (ref 0.57–1)
DEPRECATED RDW RBC AUTO: 46.4 FL (ref 37–54)
EOSINOPHIL # BLD AUTO: 0 10*3/MM3 (ref 0–0.4)
EOSINOPHIL NFR BLD AUTO: 0 % (ref 0.3–6.2)
ERYTHROCYTE [DISTWIDTH] IN BLOOD BY AUTOMATED COUNT: 13 % (ref 12.3–15.4)
GFR SERPL CREATININE-BSD FRML MDRD: 102 ML/MIN/1.73
GLOBULIN UR ELPH-MCNC: 2.2 GM/DL
GLUCOSE SERPL-MCNC: 84 MG/DL (ref 65–99)
HCT VFR BLD AUTO: 28.4 % (ref 34–46.6)
HGB BLD-MCNC: 8.9 G/DL (ref 12–15.9)
IMM GRANULOCYTES # BLD AUTO: 0.03 10*3/MM3 (ref 0–0.05)
IMM GRANULOCYTES NFR BLD AUTO: 0.4 % (ref 0–0.5)
LYMPHOCYTES # BLD AUTO: 0.77 10*3/MM3 (ref 0.7–3.1)
LYMPHOCYTES NFR BLD AUTO: 11.3 % (ref 19.6–45.3)
MCH RBC QN AUTO: 30.5 PG (ref 26.6–33)
MCHC RBC AUTO-ENTMCNC: 31.3 G/DL (ref 31.5–35.7)
MCV RBC AUTO: 97.3 FL (ref 79–97)
MONOCYTES # BLD AUTO: 0.41 10*3/MM3 (ref 0.1–0.9)
MONOCYTES NFR BLD AUTO: 6 % (ref 5–12)
NEUTROPHILS NFR BLD AUTO: 5.57 10*3/MM3 (ref 1.7–7)
NEUTROPHILS NFR BLD AUTO: 82.2 % (ref 42.7–76)
NRBC BLD AUTO-RTO: 0 /100 WBC (ref 0–0.2)
PLATELET # BLD AUTO: 217 10*3/MM3 (ref 140–450)
PMV BLD AUTO: 11.2 FL (ref 6–12)
POTASSIUM SERPL-SCNC: 3.8 MMOL/L (ref 3.5–5.2)
PROT SERPL-MCNC: 5.6 G/DL (ref 6–8.5)
RBC # BLD AUTO: 2.92 10*6/MM3 (ref 3.77–5.28)
SODIUM SERPL-SCNC: 140 MMOL/L (ref 136–145)
WBC # BLD AUTO: 6.79 10*3/MM3 (ref 3.4–10.8)

## 2020-11-26 PROCEDURE — 25010000002 PIPERACILLIN SOD-TAZOBACTAM PER 1 G: Performed by: SURGERY

## 2020-11-26 PROCEDURE — 80053 COMPREHEN METABOLIC PANEL: CPT | Performed by: SURGERY

## 2020-11-26 PROCEDURE — 99239 HOSP IP/OBS DSCHRG MGMT >30: CPT | Performed by: INTERNAL MEDICINE

## 2020-11-26 PROCEDURE — G0378 HOSPITAL OBSERVATION PER HR: HCPCS

## 2020-11-26 PROCEDURE — 25010000002 ONDANSETRON PER 1 MG: Performed by: SURGERY

## 2020-11-26 PROCEDURE — 85025 COMPLETE CBC W/AUTO DIFF WBC: CPT | Performed by: SURGERY

## 2020-11-26 PROCEDURE — 25010000002 HYDROMORPHONE 1 MG/ML SOLUTION: Performed by: SURGERY

## 2020-11-26 RX ORDER — ONDANSETRON 4 MG/1
4 TABLET, FILM COATED ORAL EVERY 6 HOURS PRN
Qty: 30 TABLET | Refills: 0 | Status: SHIPPED | OUTPATIENT
Start: 2020-11-26 | End: 2020-12-16

## 2020-11-26 RX ORDER — CEPHALEXIN 500 MG/1
500 CAPSULE ORAL 4 TIMES DAILY
Qty: 8 CAPSULE | Refills: 0 | Status: SHIPPED | OUTPATIENT
Start: 2020-11-26 | End: 2020-11-28

## 2020-11-26 RX ORDER — IBUPROFEN 600 MG/1
600 TABLET ORAL ONCE
Status: COMPLETED | OUTPATIENT
Start: 2020-11-26 | End: 2020-11-26

## 2020-11-26 RX ORDER — PSEUDOEPHEDRINE HCL 30 MG
100 TABLET ORAL 2 TIMES DAILY PRN
Qty: 30 CAPSULE | Refills: 0 | Status: SHIPPED | OUTPATIENT
Start: 2020-11-26 | End: 2020-12-16

## 2020-11-26 RX ORDER — OXYCODONE HYDROCHLORIDE AND ACETAMINOPHEN 5; 325 MG/1; MG/1
2 TABLET ORAL EVERY 4 HOURS PRN
Start: 2020-11-26 | End: 2020-12-05

## 2020-11-26 RX ADMIN — TAZOBACTAM SODIUM AND PIPERACILLIN SODIUM 3.38 G: 375; 3 INJECTION, SOLUTION INTRAVENOUS at 03:13

## 2020-11-26 RX ADMIN — TAZOBACTAM SODIUM AND PIPERACILLIN SODIUM 3.38 G: 375; 3 INJECTION, SOLUTION INTRAVENOUS at 08:25

## 2020-11-26 RX ADMIN — ONDANSETRON 4 MG: 2 INJECTION INTRAMUSCULAR; INTRAVENOUS at 08:25

## 2020-11-26 RX ADMIN — IBUPROFEN 600 MG: 600 TABLET, FILM COATED ORAL at 03:37

## 2020-11-26 RX ADMIN — HYDROMORPHONE HYDROCHLORIDE 1 MG: 1 INJECTION, SOLUTION INTRAMUSCULAR; INTRAVENOUS; SUBCUTANEOUS at 08:24

## 2020-11-26 RX ADMIN — HYDROMORPHONE HYDROCHLORIDE 1 MG: 1 INJECTION, SOLUTION INTRAMUSCULAR; INTRAVENOUS; SUBCUTANEOUS at 00:07

## 2020-11-27 ENCOUNTER — READMISSION MANAGEMENT (OUTPATIENT)
Dept: CALL CENTER | Facility: HOSPITAL | Age: 30
End: 2020-11-27

## 2020-11-27 NOTE — OUTREACH NOTE
Prep Survey      Responses   [facility] facility patient discharged from?  [place]   Is LACE score < 7 ?  No   Eligibility  Readm Mgmt   Discharge diagnosis  Gallstone pancreatitis, UTI [s/p lap joe w/ IOC]   Does the patient have one of the following disease processes/diagnoses(primary or secondary)?  General Surgery   Does the patient have Home health ordered?  No   Is there a DME ordered?  No   Comments regarding appointments  Needs f/u scheduled   Prep survey completed?  Yes          Rebekah Malhotra RN

## 2020-11-30 ENCOUNTER — APPOINTMENT (OUTPATIENT)
Dept: ULTRASOUND IMAGING | Facility: HOSPITAL | Age: 30
End: 2020-11-30

## 2020-11-30 LAB
CYTO UR: NORMAL
LAB AP CASE REPORT: NORMAL
LAB AP CLINICAL INFORMATION: NORMAL
PATH REPORT.FINAL DX SPEC: NORMAL
PATH REPORT.GROSS SPEC: NORMAL

## 2020-12-04 ENCOUNTER — READMISSION MANAGEMENT (OUTPATIENT)
Dept: CALL CENTER | Facility: HOSPITAL | Age: 30
End: 2020-12-04

## 2020-12-04 NOTE — OUTREACH NOTE
General Surgery Week 2 Survey      Responses   [facility] patient discharged from?  [place]   Does the patient have one of the following disease processes/diagnoses(primary or secondary)?  General Surgery   Week 2 attempt successful?  No   Unsuccessful attempts  Attempt 1          Sergo Johnson RN

## 2020-12-08 ENCOUNTER — READMISSION MANAGEMENT (OUTPATIENT)
Dept: CALL CENTER | Facility: HOSPITAL | Age: 30
End: 2020-12-08

## 2020-12-08 NOTE — OUTREACH NOTE
General Surgery Week 2 Survey      Responses   [facility] patient discharged from?  [place]   Does the patient have one of the following disease processes/diagnoses(primary or secondary)?  General Surgery   Week 2 attempt successful?  No   Unsuccessful attempts  Attempt 2          Sushma Siu RN

## 2020-12-12 NOTE — PROGRESS NOTES
Subjective   Chief Complaint   Patient presents with   • Postpartum Care     Izzy Hernandez is a 30 y.o. year old  presenting to be seen for her postpartum visit.  She had a vaginal delivery.  Her son is doing well.    Since delivery she has not been sexually active.  She does not have concerns about post-partum blues/depression.   Bailey Score = 0  She is breast feeding and plans to continues for 6 month(s).  For ongoing contraception, her plans are undecided.    The following portions of the patient's history were reviewed and updated as appropriate:current medications and allergies    Social History    Tobacco Use      Smoking status: Never Smoker      Smokeless tobacco: Never Used      Review of Systems  Constitutional POS: nothing reported    NEG: anorexia or night sweats   Genitourinary POS: nothing reported    NEG: dysuria or hematuria      Gastointestinal POS: nothing reported    NEG: bloating, change in bowel habits, melena or reflux symptoms   Breast POS: nothing reported    NEG: persistent breast lump, skin dimpling or nipple discharge        Objective   /68   Resp 14   Wt 92.1 kg (203 lb)   LMP 2020 (Approximate)   Breastfeeding Yes   BMI 32.77 kg/m²     General:  well developed; well nourished  no acute distress   Abdomen: soft, non-tender; no masses  no umbilical or inguinal hernias are present  no hepato-splenomegaly   Pelvis: Clinical staff was present for exam  External genitalia:  normal appearance of the external genitalia including Bartholin's and [place]'s glands.  :  urethral meatus normal;  Vaginal:  normal pink mucosa without prolapse or lesions.  Cervix:  normal appearance.  Uterus:  normal size, shape and consistency.  Adnexa:  normal bimanual exam of the adnexa.  Rectal:  digital rectal exam not performed; anus visually normal appearing.          Assessment   1. Normal 6 week postpartum exam S/P vaginal delivery     Plan   1. BC options reviewed and  compared today: Depo-Provera, IUD - Mirena, Nexplanon and OCP (progestin only)   2. After considering these options she would like to start with a birth control pill and see how that goes  3. The importance of keeping all planned follow-up and taking all medications as prescribed was emphasized.  4. Follow up for annual exam ~ 8 months    New Medications Ordered This Visit   Medications   • Drospirenone (Slynd) 4 MG tablet     Sig: Take 1 tablet by mouth Daily.     Dispense:  28 tablet     Refill:  10          This note was electronically signed.    Isaias Wen M.D.  December 16, 2020    Note: Speech recognition transcription software may have been used to create portions of this document.  An attempt at proofreading has been made but errors in transcription could still be present.

## 2020-12-16 ENCOUNTER — POSTPARTUM VISIT (OUTPATIENT)
Dept: OBSTETRICS AND GYNECOLOGY | Facility: CLINIC | Age: 30
End: 2020-12-16

## 2020-12-16 VITALS
RESPIRATION RATE: 14 BRPM | BODY MASS INDEX: 32.77 KG/M2 | DIASTOLIC BLOOD PRESSURE: 68 MMHG | WEIGHT: 203 LBS | SYSTOLIC BLOOD PRESSURE: 122 MMHG

## 2020-12-16 PROBLEM — R10.11 RUQ PAIN: Status: RESOLVED | Noted: 2020-11-24 | Resolved: 2020-12-16

## 2020-12-16 PROCEDURE — 0503F POSTPARTUM CARE VISIT: CPT | Performed by: OBSTETRICS & GYNECOLOGY

## 2020-12-16 RX ORDER — DROSPIRENONE 4 MG/1
1 TABLET, FILM COATED ORAL DAILY
Qty: 28 TABLET | Refills: 10 | Status: SHIPPED | OUTPATIENT
Start: 2020-12-16 | End: 2020-12-29 | Stop reason: SDUPTHER

## 2020-12-29 ENCOUNTER — TELEPHONE (OUTPATIENT)
Dept: OBSTETRICS AND GYNECOLOGY | Facility: CLINIC | Age: 30
End: 2020-12-29

## 2020-12-29 RX ORDER — DROSPIRENONE 4 MG/1
1 TABLET, FILM COATED ORAL DAILY
Qty: 84 TABLET | Refills: 3 | Status: SHIPPED | OUTPATIENT
Start: 2020-12-29 | End: 2021-12-07

## 2020-12-29 NOTE — TELEPHONE ENCOUNTER
Done    New Medications Ordered This Visit   Medications   • Drospirenone (Slynd) 4 MG tablet     Sig: Take 1 tablet by mouth Daily.     Dispense:  84 tablet     Refill:  3

## 2020-12-29 NOTE — TELEPHONE ENCOUNTER
HECTOR    PT RETURNED CALL. SHE IS OKAY TO GO WITH THE RHIANNA TUBBS OPTION FOR THE BIRTH CONTROL.

## 2020-12-29 NOTE — TELEPHONE ENCOUNTER
Please call her and let her know we received notification from [facility] that her Slynd birth control is not covered and they suggested other options such as a progesterone only birth control pill (norethindrone).  She has a couple options.  I suspect when she stops at the pharmacy and presents her co-pay card if they were to run this as secondary insurance it often is cheaper.  If on the other hand that does not work if it is a cash price that will frequently bring it to $65 for 3-month supply.  Finally if she still wants to stick with this medicine, [facility] will do this for about half of that price and deliver it to her free of charge.  After talking to her about these options let me know what she wants to do.

## 2021-06-15 ENCOUNTER — LAB (OUTPATIENT)
Dept: LAB | Facility: HOSPITAL | Age: 31
End: 2021-06-15

## 2021-06-15 ENCOUNTER — OFFICE VISIT (OUTPATIENT)
Dept: INTERNAL MEDICINE | Facility: CLINIC | Age: 31
End: 2021-06-15

## 2021-06-15 VITALS
BODY MASS INDEX: 32.69 KG/M2 | SYSTOLIC BLOOD PRESSURE: 124 MMHG | WEIGHT: 203.4 LBS | RESPIRATION RATE: 14 BRPM | DIASTOLIC BLOOD PRESSURE: 78 MMHG | HEIGHT: 66 IN

## 2021-06-15 DIAGNOSIS — R74.8 ELEVATED LIVER ENZYMES: ICD-10-CM

## 2021-06-15 DIAGNOSIS — Z13.0 SCREENING FOR DEFICIENCY ANEMIA: ICD-10-CM

## 2021-06-15 DIAGNOSIS — Z86.006 H/O MELANOMA IN SITU: ICD-10-CM

## 2021-06-15 DIAGNOSIS — Z13.29 SCREENING FOR ENDOCRINE DISORDER: ICD-10-CM

## 2021-06-15 DIAGNOSIS — M79.601 RIGHT ARM PAIN: Primary | ICD-10-CM

## 2021-06-15 DIAGNOSIS — Z76.89 ENCOUNTER TO ESTABLISH CARE: ICD-10-CM

## 2021-06-15 LAB
ALBUMIN SERPL-MCNC: 4.2 G/DL (ref 3.5–5.2)
ALBUMIN/GLOB SERPL: 1.7 G/DL
ALP SERPL-CCNC: 62 U/L (ref 39–117)
ALT SERPL W P-5'-P-CCNC: 14 U/L (ref 1–33)
ANION GAP SERPL CALCULATED.3IONS-SCNC: 9.7 MMOL/L (ref 5–15)
AST SERPL-CCNC: 13 U/L (ref 1–32)
BASOPHILS # BLD AUTO: 0.01 10*3/MM3 (ref 0–0.2)
BASOPHILS NFR BLD AUTO: 0.2 % (ref 0–1.5)
BILIRUB SERPL-MCNC: 0.4 MG/DL (ref 0–1.2)
BUN SERPL-MCNC: 12 MG/DL (ref 6–20)
BUN/CREAT SERPL: 13.8 (ref 7–25)
CALCIUM SPEC-SCNC: 8.8 MG/DL (ref 8.6–10.5)
CHLORIDE SERPL-SCNC: 107 MMOL/L (ref 98–107)
CO2 SERPL-SCNC: 22.3 MMOL/L (ref 22–29)
CREAT SERPL-MCNC: 0.87 MG/DL (ref 0.57–1)
DEPRECATED RDW RBC AUTO: 40.4 FL (ref 37–54)
EOSINOPHIL # BLD AUTO: 0.09 10*3/MM3 (ref 0–0.4)
EOSINOPHIL NFR BLD AUTO: 2.1 % (ref 0.3–6.2)
ERYTHROCYTE [DISTWIDTH] IN BLOOD BY AUTOMATED COUNT: 12.7 % (ref 12.3–15.4)
GFR SERPL CREATININE-BSD FRML MDRD: 76 ML/MIN/1.73
GLOBULIN UR ELPH-MCNC: 2.5 GM/DL
GLUCOSE SERPL-MCNC: 96 MG/DL (ref 65–99)
HCT VFR BLD AUTO: 38.3 % (ref 34–46.6)
HGB BLD-MCNC: 12.6 G/DL (ref 12–15.9)
IMM GRANULOCYTES # BLD AUTO: 0.01 10*3/MM3 (ref 0–0.05)
IMM GRANULOCYTES NFR BLD AUTO: 0.2 % (ref 0–0.5)
LYMPHOCYTES # BLD AUTO: 1.43 10*3/MM3 (ref 0.7–3.1)
LYMPHOCYTES NFR BLD AUTO: 33.3 % (ref 19.6–45.3)
MCH RBC QN AUTO: 28.5 PG (ref 26.6–33)
MCHC RBC AUTO-ENTMCNC: 32.9 G/DL (ref 31.5–35.7)
MCV RBC AUTO: 86.7 FL (ref 79–97)
MONOCYTES # BLD AUTO: 0.34 10*3/MM3 (ref 0.1–0.9)
MONOCYTES NFR BLD AUTO: 7.9 % (ref 5–12)
NEUTROPHILS NFR BLD AUTO: 2.42 10*3/MM3 (ref 1.7–7)
NEUTROPHILS NFR BLD AUTO: 56.3 % (ref 42.7–76)
NRBC BLD AUTO-RTO: 0 /100 WBC (ref 0–0.2)
PLATELET # BLD AUTO: 210 10*3/MM3 (ref 140–450)
PMV BLD AUTO: 11.5 FL (ref 6–12)
POTASSIUM SERPL-SCNC: 4.6 MMOL/L (ref 3.5–5.2)
PROT SERPL-MCNC: 6.7 G/DL (ref 6–8.5)
RBC # BLD AUTO: 4.42 10*6/MM3 (ref 3.77–5.28)
SODIUM SERPL-SCNC: 139 MMOL/L (ref 136–145)
TSH SERPL DL<=0.05 MIU/L-ACNC: 1.76 UIU/ML (ref 0.27–4.2)
WBC # BLD AUTO: 4.3 10*3/MM3 (ref 3.4–10.8)

## 2021-06-15 PROCEDURE — 85025 COMPLETE CBC W/AUTO DIFF WBC: CPT

## 2021-06-15 PROCEDURE — 99214 OFFICE O/P EST MOD 30 MIN: CPT | Performed by: NURSE PRACTITIONER

## 2021-06-15 PROCEDURE — 84443 ASSAY THYROID STIM HORMONE: CPT

## 2021-06-15 PROCEDURE — 80053 COMPREHEN METABOLIC PANEL: CPT

## 2021-06-15 NOTE — PROGRESS NOTES
"Izzy Hernandez  1990  [id number]  Patient Care Team:  Emily Paul APRN as PCP - General (Internal Medicine)  Isaias Wen MD as Obstetrician (Obstetrics and Gynecology)    Izzy Hernandez is a 31 y.o. here today to establish care.    Chief Complaint   Patient presents with   • Establish Care       HPI:   Pt is here to establish care.    Acute problem daily intermittent right shoulder pain which radiates to mid upper arm. Pain with lateral arm raise, has full ROM with discomfort, no arm weakness.  No swelling.  This started about a week and a half ago.  Has been taking aleve for pain maybe a \"handful of times\".  She is right handed. No arm numbness or tingling. No neck pain.    Recent medical history includes vag delivery (son, Alfredo) on 11/2/20, no complications.  Induced at 37 weeks secondary to high blood pressure.  No gestational diabetes.  Was on blood pressure medications postpartum x2 weeks.  3 weeks postpartum had gallstone pancreatitis and cholecystectomy.  Prepregnancy weight 195 pounds.      Past Medical History:   Diagnosis Date   • Gestational hypertension, third trimester 10/29/2020   • H/O melanoma in situ - on back 06/2018     Past Surgical History:   Procedure Laterality Date   • CHOLECYSTECTOMY  11/25/2020   • CHOLECYSTECTOMY WITH INTRAOPERATIVE CHOLANGIOGRAM N/A 11/25/2020    Procedure: CHOLECYSTECTOMY LAPAROSCOPIC INTRAOPERATIVE CHOLANGIOGRAM;  Surgeon: Dennis Zhou MD;  Location: [facility];  Service: General;  Laterality: N/A;   • SKIN CANCER EXCISION  06/2018    MIS on back   • WISDOM TOOTH EXTRACTION  2012     Family History   Problem Relation Age of Onset   • Diabetes Father      Social History     Tobacco Use   Smoking Status Never Smoker   Smokeless Tobacco Never Used     No Known Allergies    Current Outpatient Medications:   •  Prenatal Vit-Fe Fumarate-FA (PRENATAL VITAMIN PO), Take 1 tablet by mouth Daily., Disp: , Rfl:   •  Drospirenone (Slynd) " "4 MG tablet, Take 1 tablet by mouth Daily., Disp: 84 tablet, Rfl: 3    Review of Systems   Constitutional: Negative.    HENT: Negative.    Eyes: Negative.    Respiratory: Negative.    Cardiovascular: Negative.    Gastrointestinal: Negative.    Endocrine: Negative.    Genitourinary: Negative.    Skin: Negative.    Psychiatric/Behavioral: Negative.        /78   Resp 14   Ht 167.6 cm (65.98\")   Wt 92.3 kg (203 lb 6.4 oz)   BMI 32.85 kg/m²     Physical Exam  Constitutional:       Appearance: Normal appearance. She is well-developed.   HENT:      Head: Normocephalic and atraumatic.      Comments: *wearing mask     Nose: Nose normal.   Eyes:      Conjunctiva/sclera: Conjunctivae normal.      Pupils: Pupils are equal, round, and reactive to light.   Cardiovascular:      Rate and Rhythm: Normal rate and regular rhythm.      Pulses: Normal pulses.      Heart sounds: Normal heart sounds.   Pulmonary:      Effort: Pulmonary effort is normal.      Breath sounds: Normal breath sounds.   Musculoskeletal:         General: Normal range of motion.      Right shoulder: Tenderness present. No swelling or deformity. Normal range of motion. Normal strength.      Left shoulder: Normal.      Right upper arm: Tenderness present. No edema or deformity.      Left upper arm: Normal.      Cervical back: Normal range of motion and neck supple.   Skin:     General: Skin is warm and dry.   Neurological:      General: No focal deficit present.      Mental Status: She is alert and oriented to person, place, and time.   Psychiatric:         Mood and Affect: Mood normal.         Behavior: Behavior normal.         Thought Content: Thought content normal.         Judgment: Judgment normal.         Results Review:  None    Assessment/Plan:  Patient Instructions   Problem List Items Addressed This Visit        Hematology and Neoplasia    H/O melanoma in situ      Other Visit Diagnoses     Right arm pain    -  Primary    referred to PT    " Relevant Orders    Ambulatory Referral to Physical Therapy Evaluate and treat    Screening for deficiency anemia        Sent for labs    Relevant Orders    CBC & Differential    Elevated liver enzymes        Sent for labs    Relevant Orders    Comprehensive Metabolic Panel    Screening for endocrine disorder        Sent for labs    Relevant Orders    TSH Rfx On Abnormal To Free T4    Encounter to establish care                 Diagnosis Plan   1. Right arm pain  Ambulatory Referral to Physical Therapy Evaluate and treat    referred to PT   2. H/O melanoma in situ     3. Screening for deficiency anemia  CBC & Differential    Sent for labs   4. Elevated liver enzymes  Comprehensive Metabolic Panel    Sent for labs   5. Screening for endocrine disorder  TSH Rfx On Abnormal To Free T4    Sent for labs   6. Encounter to establish care         There are no Patient Instructions on file for this visit.    Plan of care reviewed with patient at the conclusion of today's visit. Education was provided regarding diagnosis and management.  Patient verbalizes understanding of and agreement with management plan.    Return in about 6 months (around 12/15/2021) for Labs this visit.    * Please note that portions of this note were completed with a voice recognition program. Efforts were made to edit the dictation but occasionally words are transcribed.     DEEPIKA Milan

## 2021-12-06 RX ORDER — DROSPIRENONE 4 MG/1
1 TABLET, FILM COATED ORAL DAILY
Qty: 84 TABLET | Refills: 3 | Status: CANCELLED | OUTPATIENT
Start: 2021-12-06

## 2021-12-07 ENCOUNTER — OFFICE VISIT (OUTPATIENT)
Dept: OBSTETRICS AND GYNECOLOGY | Facility: CLINIC | Age: 31
End: 2021-12-07

## 2021-12-07 VITALS
BODY MASS INDEX: 33.1 KG/M2 | RESPIRATION RATE: 14 BRPM | DIASTOLIC BLOOD PRESSURE: 76 MMHG | SYSTOLIC BLOOD PRESSURE: 122 MMHG | WEIGHT: 205 LBS

## 2021-12-07 DIAGNOSIS — Z01.419 WELL WOMAN EXAM: Primary | ICD-10-CM

## 2021-12-07 DIAGNOSIS — Z71.85 VACCINE COUNSELING: ICD-10-CM

## 2021-12-07 PROCEDURE — 99395 PREV VISIT EST AGE 18-39: CPT | Performed by: OBSTETRICS & GYNECOLOGY

## 2021-12-07 NOTE — PROGRESS NOTES
Subjective   Chief Complaint   Patient presents with   • Gynecologic Exam     Izzy Hernandez is a 31 y.o. year old  presenting to be seen for her annual exam.  There are several issues she wants to talk about today.  She has had alternating diarrhea and constipation.  It is definitely been worse since the gallbladder was removed postpartum.  She also has systemic myalgias involving multiple joints especially involving the lower extremities.  She gets a good 8 hours of uninterrupted sleep.  She does have an appointment to see her primary care to discuss.    SEXUAL Hx:  She is currently sexually active.  In the past year there there has been NO new sexual partners.    Condoms are never used.  She would not like to be screened for STD's at today's exam.  Current birth control method: not using any form of contraception.  She is not trying to conceive but would be OK if she did get pregnant.  She is happy with her current method of contraception and does not want to discuss alternative methods of contraception.  MENSTRUAL Hx:  Patient's last menstrual period was 11/15/2021 (approximate).  In the past 6 months her cycles have been regular, predictable and occur monthly.  Her menstrual flow is typically normal.   Each month on average there are roughly 0 day(s) of very heavy flow.  Intermenstrual bleeding is absent.    Post-coital bleeding is absent.  Dysmenorrhea: is not affecting her activities of daily living  PMS: is not affecting her activities of daily living  Her cycles are not a source of concern for her that she wishes to discuss today.  HEALTH Hx:  She exercises regularly: yes.  She wears her seat belt: yes.  She has concerns about domestic violence: no.  OTHER THINGS SHE WANTS TO DISCUSS TODAY:  Nothing else    The following portions of the patient's history were reviewed and updated as appropriate:problem list, current medications, allergies, past family history, past medical history, past  social history and past surgical history.    Social History    Tobacco Use      Smoking status: Never Smoker      Smokeless tobacco: Never Used    Review of Systems  Constitutional POS: nothing reported    NEG: anorexia or night sweats   Genitourinary POS: nothing reported    NEG: dysuria or hematuria      Gastointestinal POS: see HPI    NEG: bloating, change in bowel habits, melena or reflux symptoms   Integument POS: nothing reported and she sees her dermatologist for routine skins exams    NEG: moles that are changing in size, shape, color or rashes   Breast POS: nothing reported    NEG: persistent breast lump, skin dimpling or nipple discharge        Objective   /76   Resp 14   Wt 93 kg (205 lb)   LMP 11/15/2021 (Approximate)   Breastfeeding No   BMI 33.10 kg/m²     General:  well developed; well nourished  no acute distress   Skin:  No suspicious lesions seen   Thyroid: normal to inspection and palpation   Breasts:  Examined in supine position  Symmetric without masses or skin dimpling  Nipples normal without inversion, lesions or discharge  There are no palpable axillary nodes   Abdomen: soft, non-tender; no masses  no umbilical or inguinal hernias are present  no hepato-splenomegaly   Pelvis: Clinical staff was present for exam  External genitalia:  normal appearance of the external genitalia including Bartholin's and [place]'s glands.  :  urethral meatus normal;  Vaginal:  normal pink mucosa without prolapse or lesions.  Cervix:  normal appearance.  Uterus:  normal size, shape and consistency.  Adnexa:  normal bimanual exam of the adnexa.  Rectal:  digital rectal exam not performed; anus visually normal appearing.        Assessment   1. Normal GYN exam  2. Pre-conceptional - she currently is taking sufficient folic acid  3. Alternating diarrhea and constipation most consistent with irritable bowel.  May be somewhat exacerbated by fatty food intake postcholecystectomy  4. She is up to date on all  relevant gynecologic and colorectal screenings     Plan   1. Pap was done today per patient request even though it has been less then 3 years since her last Pap smear.  If she does not receive the results of the Pap within 2 weeks  time, she was instructed to call to find out the results.  I explained to Izzy that the recommendations for Pap smear interval in a low risk patient's has lengthened to 3 years time.  I encouraged her to be seen yearly for a full physical exam including breast and pelvic exam even during the off years when PAP's will not be performed.  After discussing that the potential for non-coverage of PAP, an ABN was signed.  2. Her vaccine record was reviewed and updated.  3. Defer work-up as a relates to the myalgias and diarrhea/constipation to primary care  4. I discussed with Izzy that she may be behind on needed vaccinations for COVID booster.  She may be able to obtain these vaccinations at her local pharmacy OR speak about obtaining them with her primary care.  If she does obtain her vaccines, I have asked Izzy to let us know the date each vaccine was obtained so that her medical record could be updated in our system.  5. The importance of keeping all planned follow-up and taking all medications as prescribed was emphasized.  6. Follow up for annual exam 1 year           This note was electronically signed.    Isaias Wen M.D.  December 7, 2021    Part of this note may be an electronic transcription/translation of spoken language to printed text using the Dragon Dictation System.

## 2021-12-15 ENCOUNTER — LAB (OUTPATIENT)
Dept: LAB | Facility: HOSPITAL | Age: 31
End: 2021-12-15

## 2021-12-15 ENCOUNTER — OFFICE VISIT (OUTPATIENT)
Dept: INTERNAL MEDICINE | Facility: CLINIC | Age: 31
End: 2021-12-15

## 2021-12-15 VITALS
BODY MASS INDEX: 33.43 KG/M2 | DIASTOLIC BLOOD PRESSURE: 70 MMHG | SYSTOLIC BLOOD PRESSURE: 106 MMHG | HEART RATE: 84 BPM | TEMPERATURE: 97.7 F | HEIGHT: 66 IN | WEIGHT: 208 LBS

## 2021-12-15 DIAGNOSIS — R19.8 ALTERNATING CONSTIPATION AND DIARRHEA: Primary | ICD-10-CM

## 2021-12-15 DIAGNOSIS — F43.9 STRESS: ICD-10-CM

## 2021-12-15 DIAGNOSIS — R53.83 FATIGUE, UNSPECIFIED TYPE: ICD-10-CM

## 2021-12-15 DIAGNOSIS — M25.50 ARTHRALGIA, UNSPECIFIED JOINT: ICD-10-CM

## 2021-12-15 LAB
25(OH)D3 SERPL-MCNC: 26.5 NG/ML (ref 30–100)
BASOPHILS # BLD AUTO: 0.02 10*3/MM3 (ref 0–0.2)
BASOPHILS NFR BLD AUTO: 0.5 % (ref 0–1.5)
CRP SERPL-MCNC: <0.3 MG/DL (ref 0–0.5)
DEPRECATED RDW RBC AUTO: 37.3 FL (ref 37–54)
EOSINOPHIL # BLD AUTO: 0.08 10*3/MM3 (ref 0–0.4)
EOSINOPHIL NFR BLD AUTO: 1.9 % (ref 0.3–6.2)
ERYTHROCYTE [DISTWIDTH] IN BLOOD BY AUTOMATED COUNT: 12.2 % (ref 12.3–15.4)
ERYTHROCYTE [SEDIMENTATION RATE] IN BLOOD: 13 MM/HR (ref 0–20)
HCT VFR BLD AUTO: 37.7 % (ref 34–46.6)
HGB BLD-MCNC: 12.9 G/DL (ref 12–15.9)
IMM GRANULOCYTES # BLD AUTO: 0.01 10*3/MM3 (ref 0–0.05)
IMM GRANULOCYTES NFR BLD AUTO: 0.2 % (ref 0–0.5)
LYMPHOCYTES # BLD AUTO: 1.23 10*3/MM3 (ref 0.7–3.1)
LYMPHOCYTES NFR BLD AUTO: 29.1 % (ref 19.6–45.3)
MCH RBC QN AUTO: 29.2 PG (ref 26.6–33)
MCHC RBC AUTO-ENTMCNC: 34.2 G/DL (ref 31.5–35.7)
MCV RBC AUTO: 85.3 FL (ref 79–97)
MONOCYTES # BLD AUTO: 0.28 10*3/MM3 (ref 0.1–0.9)
MONOCYTES NFR BLD AUTO: 6.6 % (ref 5–12)
NEUTROPHILS NFR BLD AUTO: 2.6 10*3/MM3 (ref 1.7–7)
NEUTROPHILS NFR BLD AUTO: 61.7 % (ref 42.7–76)
NRBC BLD AUTO-RTO: 0 /100 WBC (ref 0–0.2)
PLATELET # BLD AUTO: 226 10*3/MM3 (ref 140–450)
PMV BLD AUTO: 11.4 FL (ref 6–12)
RBC # BLD AUTO: 4.42 10*6/MM3 (ref 3.77–5.28)
TSH SERPL DL<=0.05 MIU/L-ACNC: 1.79 UIU/ML (ref 0.27–4.2)
VIT B12 BLD-MCNC: 428 PG/ML (ref 211–946)
WBC NRBC COR # BLD: 4.22 10*3/MM3 (ref 3.4–10.8)

## 2021-12-15 PROCEDURE — 86038 ANTINUCLEAR ANTIBODIES: CPT

## 2021-12-15 PROCEDURE — 85025 COMPLETE CBC W/AUTO DIFF WBC: CPT

## 2021-12-15 PROCEDURE — 99214 OFFICE O/P EST MOD 30 MIN: CPT | Performed by: NURSE PRACTITIONER

## 2021-12-15 PROCEDURE — 84443 ASSAY THYROID STIM HORMONE: CPT

## 2021-12-15 PROCEDURE — 86140 C-REACTIVE PROTEIN: CPT

## 2021-12-15 PROCEDURE — 82607 VITAMIN B-12: CPT

## 2021-12-15 PROCEDURE — 82306 VITAMIN D 25 HYDROXY: CPT

## 2021-12-15 PROCEDURE — 85652 RBC SED RATE AUTOMATED: CPT

## 2021-12-15 NOTE — PATIENT INSTRUCTIONS
Stress, Adult  Stress is a normal reaction to life events. Stress is what you feel when life demands more than you are used to, or more than you think you can handle. Some stress can be useful, such as studying for a test or meeting a deadline at work. Stress that occurs too often or for too long can cause problems. It can affect your emotional health and interfere with relationships and normal daily activities. Too much stress can weaken your body's defense system (immune system) and increase your risk for physical illness. If you already have a medical problem, stress can make it worse.  What are the causes?  All sorts of life events can cause stress. An event that causes stress for one person may not be stressful for another person. Major life events, whether positive or negative, commonly cause stress. Examples include:  · Losing a job or starting a new job.  · Losing a loved one.  · Moving to a new town or home.  · Getting  or .  · Having a baby.  · Getting injured or sick.  Less obvious life events can also cause stress, especially if they occur day after day or in combination with each other. Examples include:  · Working long hours.  · Driving in traffic.  · Caring for children.  · Being in debt.  · Being in a difficult relationship.  What are the signs or symptoms?  Stress can cause emotional symptoms, including:  · Anxiety. This is feeling worried, afraid, on edge, overwhelmed, or out of control.  · Anger, including irritation or impatience.  · Depression. This is feeling sad, down, helpless, or guilty.  · Trouble focusing, remembering, or making decisions.  Stress can cause physical symptoms, including:  · Aches and pains. These may affect your head, neck, back, stomach, or other areas of your body.  · Tight muscles or a clenched jaw.  · Low energy.  · Trouble sleeping.  Stress can cause unhealthy behaviors, including:  · Eating to feel better (overeating) or skipping meals.  · Working too  much or putting off tasks.  · Smoking, drinking alcohol, or using drugs to feel better.  How is this diagnosed?  Stress is diagnosed through an assessment by your health care provider. He or she may diagnose this condition based on:  · Your symptoms and any stressful life events.  · Your medical history.  · Tests to rule out other causes of your symptoms.  Depending on your condition, your health care provider may refer you to a specialist for further evaluation.  How is this treated?    Stress management techniques are the recommended treatment for stress. Medicine is not typically recommended for the treatment of stress.  Techniques to reduce your reaction to stressful life events include:  · Stress identification. Monitor yourself for symptoms of stress and identify what causes stress for you. These skills may help you to avoid or prepare for stressful events.  · Time management. Set your priorities, keep a calendar of events, and learn to say no. Taking these actions can help you avoid making too many commitments.  Techniques for coping with stress include:  · Rethinking the problem. Try to think realistically about stressful events rather than ignoring them or overreacting. Try to find the positives in a stressful situation rather than focusing on the negatives.  · Exercise. Physical exercise can release both physical and emotional tension. The key is to find a form of exercise that you enjoy and do it regularly.  · Relaxation techniques. These relax the body and mind. The key is to find one or more that you enjoy and use the techniques regularly. Examples include:  ? Meditation, deep breathing, or progressive relaxation techniques.  ? Yoga or kelly chi.  ? Biofeedback, mindfulness techniques, or journaling.  ? Listening to music, being out in nature, or participating in other hobbies.  · Practicing a healthy lifestyle. Eat a balanced diet, drink plenty of water, limit or avoid caffeine, and get plenty of  sleep.  · Having a strong support network. Spend time with family, friends, or other people you enjoy being around. Express your feelings and talk things over with someone you trust.  Counseling or talk therapy with a mental health professional may be helpful if you are having trouble managing stress on your own.  Follow these instructions at home:  Lifestyle    · Avoid drugs.  · Do not use any products that contain nicotine or tobacco, such as cigarettes, e-cigarettes, and chewing tobacco. If you need help quitting, ask your health care provider.  · Limit alcohol intake to no more than 1 drink a day for nonpregnant women and 2 drinks a day for men. One drink equals 12 oz of beer, 5 oz of wine, or 1½ oz of hard liquor  · Do not use alcohol or drugs to relax.  · Eat a balanced diet that includes fresh fruits and vegetables, whole grains, lean meats, fish, eggs, and beans, and low-fat dairy. Avoid processed foods and foods high in added fat, sugar, and salt.  · Exercise at least 30 minutes on 5 or more days each week.  · Get 7-8 hours of sleep each night.    General instructions    · Practice stress management techniques as discussed with your health care provider.  · Drink enough fluid to keep your urine clear or pale yellow.  · Take over-the-counter and prescription medicines only as told by your health care provider.  · Keep all follow-up visits as told by your health care provider. This is important.    Contact a health care provider if:  · Your symptoms get worse.  · You have new symptoms.  · You feel overwhelmed by your problems and can no longer manage them on your own.  Get help right away if:  · You have thoughts of hurting yourself or others.  If you ever feel like you may hurt yourself or others, or have thoughts about taking your own life, get help right away. You can go to your nearest emergency department or call:  · Your local emergency services (911 in the [place]  · A suicide crisis helpline, such as  the National Suicide Prevention Lifeline at 1-744.900.2729. This is open 24 hours a day.  Summary  · Stress is a normal reaction to life events. It can cause problems if it happens too often or for too long.  · Practicing stress management techniques is the best way to treat stress.  · Counseling or talk therapy with a mental health professional may be helpful if you are having trouble managing stress on your own.  This information is not intended to replace advice given to you by your health care provider. Make sure you discuss any questions you have with your health care provider.  Document Revised: 07/17/2020 Document Reviewed: 02/07/2018  Elsevier Patient Education © 2021 [employer]

## 2021-12-15 NOTE — PROGRESS NOTES
Izzy Hernandez  1990  [id number]  Patient Care Team:  Emily Paul APRN as PCP - General (Internal Medicine)  Isaias Wen MD as Obstetrician (Obstetrics and Gynecology)    Izzy eHrnandez is a pleasant 31 y.o. female who presents for evaluation of Diarrhea, Stress, and Joint Pain    Chief Complaint   Patient presents with   • Diarrhea   • Stress   • Joint Pain       HPI:   Izzy has GI symptoms including constipation and diarrhea that developed after her joe 11/25/20 (postpartum).  Often has diarreha after eating but not always, sometimes triggered by certain foods like salad.No blood in stool    Joint pain:  Pain in bilat knees and ankles that developed postpartum.  Very sore, domi in am she struggles to get up and walk, also happens after prolonged sitting.  Occasionally uses nsaids.  No joint swelling.  She is not exercising regularly.  She has had a considerable amount of fatigue postpartum.  She is sleeping well uninterrupted.    She and her  and the baby are doing well but she is having some family stressors with her parents and is considering counseling.  Past Medical History:   Diagnosis Date   • Gestational hypertension, third trimester 10/29/2020   • H/O melanoma in situ - on back 06/2018     Past Surgical History:   Procedure Laterality Date   • CHOLECYSTECTOMY  11/25/2020   • CHOLECYSTECTOMY WITH INTRAOPERATIVE CHOLANGIOGRAM N/A 11/25/2020    Procedure: CHOLECYSTECTOMY LAPAROSCOPIC INTRAOPERATIVE CHOLANGIOGRAM;  Surgeon: Dennis Zhou MD;  Location: [facility];  Service: General;  Laterality: N/A;   • SKIN CANCER EXCISION  06/2018    MIS on back   • WISDOM TOOTH EXTRACTION  2012     Family History   Problem Relation Age of Onset   • Diabetes Father      Social History     Tobacco Use   Smoking Status Never Smoker   Smokeless Tobacco Never Used     No Known Allergies    Current Outpatient Medications:   •  Prenatal Vit-Fe Fumarate-FA (PRENATAL VITAMIN  "PO), Take 1 tablet by mouth Daily., Disp: , Rfl:     Review of Systems  /70 (BP Location: Left arm, Patient Position: Sitting, Cuff Size: Large Adult)   Pulse 84   Temp 97.7 °F (36.5 °C) (Temporal)   Ht 167.6 cm (65.98\")   Wt 94.3 kg (208 lb)   LMP 11/15/2021 (Approximate)   BMI 33.59 kg/m²     Physical Exam  Constitutional:       Appearance: She is well-developed.   HENT:      Head: Normocephalic and atraumatic.      Comments: *wearing mask  Eyes:      Conjunctiva/sclera: Conjunctivae normal.      Pupils: Pupils are equal, round, and reactive to light.   Cardiovascular:      Rate and Rhythm: Normal rate and regular rhythm.      Pulses: Normal pulses.      Heart sounds: Normal heart sounds.   Pulmonary:      Effort: Pulmonary effort is normal.      Breath sounds: Normal breath sounds.   Musculoskeletal:         General: Normal range of motion.      Cervical back: Normal range of motion and neck supple.      Right knee: Bony tenderness present. No swelling or deformity. Normal range of motion.      Left knee: Bony tenderness present. No swelling or deformity. Normal range of motion.      Right ankle: No swelling or deformity. Tenderness present. Normal range of motion.      Left ankle: No swelling or deformity. Tenderness present. Normal range of motion.   Skin:     General: Skin is warm and dry.   Neurological:      Mental Status: She is alert and oriented to person, place, and time.   Psychiatric:         Mood and Affect: Mood normal.         Behavior: Behavior normal.         Thought Content: Thought content normal.         Judgment: Judgment normal.         Procedures    Results Review:  None    PHQ-9 Total Score:      Assessment/Plan:  Diagnoses and all orders for this visit:    1. Alternating constipation and diarrhea (Primary)  Comments:  Stop prenatal vitamin first and see if this impacts her symptoms at all.  If you continue to have prominent diarrhea add fiber as discussed    2. Arthralgia, " unspecified joint  Comments:  Labs today  Orders:  -     TSH Rfx On Abnormal To Free T4; Future  -     Nuclear Antigen Antibody, IFA; Future  -     Sedimentation Rate; Future  -     C-reactive Protein; Future  -     CBC & Differential; Future    3. Fatigue, unspecified type  Comments:  Labs today, encourage regular exercise and good nutrition  Orders:  -     Vitamin B12; Future  -     Vitamin D 25 Hydroxy; Future    4. Stress  Comments:  Encourage you to see a therapist or counselor       Patient Instructions   Stress, Adult  Stress is a normal reaction to life events. Stress is what you feel when life demands more than you are used to, or more than you think you can handle. Some stress can be useful, such as studying for a test or meeting a deadline at work. Stress that occurs too often or for too long can cause problems. It can affect your emotional health and interfere with relationships and normal daily activities. Too much stress can weaken your body's defense system (immune system) and increase your risk for physical illness. If you already have a medical problem, stress can make it worse.  What are the causes?  All sorts of life events can cause stress. An event that causes stress for one person may not be stressful for another person. Major life events, whether positive or negative, commonly cause stress. Examples include:  · Losing a job or starting a new job.  · Losing a loved one.  · Moving to a new town or home.  · Getting  or .  · Having a baby.  · Getting injured or sick.  Less obvious life events can also cause stress, especially if they occur day after day or in combination with each other. Examples include:  · Working long hours.  · Driving in traffic.  · Caring for children.  · Being in debt.  · Being in a difficult relationship.  What are the signs or symptoms?  Stress can cause emotional symptoms, including:  · Anxiety. This is feeling worried, afraid, on edge, overwhelmed, or out  of control.  · Anger, including irritation or impatience.  · Depression. This is feeling sad, down, helpless, or guilty.  · Trouble focusing, remembering, or making decisions.  Stress can cause physical symptoms, including:  · Aches and pains. These may affect your head, neck, back, stomach, or other areas of your body.  · Tight muscles or a clenched jaw.  · Low energy.  · Trouble sleeping.  Stress can cause unhealthy behaviors, including:  · Eating to feel better (overeating) or skipping meals.  · Working too much or putting off tasks.  · Smoking, drinking alcohol, or using drugs to feel better.  How is this diagnosed?  Stress is diagnosed through an assessment by your health care provider. He or she may diagnose this condition based on:  · Your symptoms and any stressful life events.  · Your medical history.  · Tests to rule out other causes of your symptoms.  Depending on your condition, your health care provider may refer you to a specialist for further evaluation.  How is this treated?    Stress management techniques are the recommended treatment for stress. Medicine is not typically recommended for the treatment of stress.  Techniques to reduce your reaction to stressful life events include:  · Stress identification. Monitor yourself for symptoms of stress and identify what causes stress for you. These skills may help you to avoid or prepare for stressful events.  · Time management. Set your priorities, keep a calendar of events, and learn to say no. Taking these actions can help you avoid making too many commitments.  Techniques for coping with stress include:  · Rethinking the problem. Try to think realistically about stressful events rather than ignoring them or overreacting. Try to find the positives in a stressful situation rather than focusing on the negatives.  · Exercise. Physical exercise can release both physical and emotional tension. The key is to find a form of exercise that you enjoy and do it  regularly.  · Relaxation techniques. These relax the body and mind. The key is to find one or more that you enjoy and use the techniques regularly. Examples include:  ? Meditation, deep breathing, or progressive relaxation techniques.  ? Yoga or kelly chi.  ? Biofeedback, mindfulness techniques, or journaling.  ? Listening to music, being out in nature, or participating in other hobbies.  · Practicing a healthy lifestyle. Eat a balanced diet, drink plenty of water, limit or avoid caffeine, and get plenty of sleep.  · Having a strong support network. Spend time with family, friends, or other people you enjoy being around. Express your feelings and talk things over with someone you trust.  Counseling or talk therapy with a mental health professional may be helpful if you are having trouble managing stress on your own.  Follow these instructions at home:  Lifestyle    · Avoid drugs.  · Do not use any products that contain nicotine or tobacco, such as cigarettes, e-cigarettes, and chewing tobacco. If you need help quitting, ask your health care provider.  · Limit alcohol intake to no more than 1 drink a day for nonpregnant women and 2 drinks a day for men. One drink equals 12 oz of beer, 5 oz of wine, or 1½ oz of hard liquor  · Do not use alcohol or drugs to relax.  · Eat a balanced diet that includes fresh fruits and vegetables, whole grains, lean meats, fish, eggs, and beans, and low-fat dairy. Avoid processed foods and foods high in added fat, sugar, and salt.  · Exercise at least 30 minutes on 5 or more days each week.  · Get 7-8 hours of sleep each night.    General instructions    · Practice stress management techniques as discussed with your health care provider.  · Drink enough fluid to keep your urine clear or pale yellow.  · Take over-the-counter and prescription medicines only as told by your health care provider.  · Keep all follow-up visits as told by your health care provider. This is important.    Contact a  health care provider if:  · Your symptoms get worse.  · You have new symptoms.  · You feel overwhelmed by your problems and can no longer manage them on your own.  Get help right away if:  · You have thoughts of hurting yourself or others.  If you ever feel like you may hurt yourself or others, or have thoughts about taking your own life, get help right away. You can go to your nearest emergency department or call:  · Your local emergency services (911 in the [place]  · A suicide crisis helpline, such as the National Suicide Prevention Lifeline at 1-919.950.7162. This is open 24 hours a day.  Summary  · Stress is a normal reaction to life events. It can cause problems if it happens too often or for too long.  · Practicing stress management techniques is the best way to treat stress.  · Counseling or talk therapy with a mental health professional may be helpful if you are having trouble managing stress on your own.  This information is not intended to replace advice given to you by your health care provider. Make sure you discuss any questions you have with your health care provider.  Document Revised: 07/17/2020 Document Reviewed: 02/07/2018  [employer] Patient Education © 2021 [employer]      Plan of care reviewed with patient at the conclusion of today's visit. Education was provided regarding diagnosis, management and any prescribed or recommended OTC medications.  Patient verbalizes understanding of and agreement with management plan.    Return in about 1 year (around 12/15/2022) for Labs this visit.    Dictated Utilizing Dragon Dictation.    DEEPIKA Milan

## 2021-12-17 LAB
ANA TITR SER IF: POSITIVE {TITER}
CENTROMERE AB TITR SER IF: ABNORMAL {TITER}
Lab: ABNORMAL

## 2021-12-20 DIAGNOSIS — R76.8 POSITIVE ANA (ANTINUCLEAR ANTIBODY): Primary | ICD-10-CM

## 2021-12-20 DIAGNOSIS — M25.50 ARTHRALGIA, UNSPECIFIED JOINT: ICD-10-CM

## 2022-02-10 ENCOUNTER — LAB (OUTPATIENT)
Dept: LAB | Facility: HOSPITAL | Age: 32
End: 2022-02-10

## 2022-02-10 ENCOUNTER — TRANSCRIBE ORDERS (OUTPATIENT)
Dept: LAB | Facility: HOSPITAL | Age: 32
End: 2022-02-10

## 2022-02-10 DIAGNOSIS — R76.8 FALSE POSITIVE SEROLOGICAL TEST FOR SYPHILIS: ICD-10-CM

## 2022-02-10 DIAGNOSIS — M25.50 PAIN IN JOINT, MULTIPLE SITES: ICD-10-CM

## 2022-02-10 DIAGNOSIS — M25.50 PAIN IN JOINT, MULTIPLE SITES: Primary | ICD-10-CM

## 2022-02-10 LAB
ALBUMIN SERPL-MCNC: 4.6 G/DL (ref 3.5–5.2)
ALBUMIN/GLOB SERPL: 1.7 G/DL
ALP SERPL-CCNC: 49 U/L (ref 39–117)
ALT SERPL W P-5'-P-CCNC: 11 U/L (ref 1–33)
ANION GAP SERPL CALCULATED.3IONS-SCNC: 11 MMOL/L (ref 5–15)
AST SERPL-CCNC: 12 U/L (ref 1–32)
BASOPHILS # BLD AUTO: 0.02 10*3/MM3 (ref 0–0.2)
BASOPHILS NFR BLD AUTO: 0.6 % (ref 0–1.5)
BILIRUB SERPL-MCNC: 0.6 MG/DL (ref 0–1.2)
BUN SERPL-MCNC: 13 MG/DL (ref 6–20)
BUN/CREAT SERPL: 16.9 (ref 7–25)
CALCIUM SPEC-SCNC: 9 MG/DL (ref 8.6–10.5)
CHLORIDE SERPL-SCNC: 105 MMOL/L (ref 98–107)
CHROMATIN AB SERPL-ACNC: <10 IU/ML (ref 0–14)
CK SERPL-CCNC: 34 U/L (ref 20–180)
CO2 SERPL-SCNC: 22 MMOL/L (ref 22–29)
CREAT SERPL-MCNC: 0.77 MG/DL (ref 0.57–1)
CRP SERPL-MCNC: <0.3 MG/DL (ref 0–0.5)
DEPRECATED RDW RBC AUTO: 40.8 FL (ref 37–54)
EOSINOPHIL # BLD AUTO: 0.06 10*3/MM3 (ref 0–0.4)
EOSINOPHIL NFR BLD AUTO: 1.7 % (ref 0.3–6.2)
ERYTHROCYTE [DISTWIDTH] IN BLOOD BY AUTOMATED COUNT: 12.7 % (ref 12.3–15.4)
ERYTHROCYTE [SEDIMENTATION RATE] IN BLOOD: 10 MM/HR (ref 0–20)
FERRITIN SERPL-MCNC: 40.3 NG/ML (ref 13–150)
GFR SERPL CREATININE-BSD FRML MDRD: 87 ML/MIN/1.73
GLOBULIN UR ELPH-MCNC: 2.7 GM/DL
GLUCOSE SERPL-MCNC: 87 MG/DL (ref 65–99)
HCT VFR BLD AUTO: 41 % (ref 34–46.6)
HGB BLD-MCNC: 13.4 G/DL (ref 12–15.9)
IMM GRANULOCYTES # BLD AUTO: 0 10*3/MM3 (ref 0–0.05)
IMM GRANULOCYTES NFR BLD AUTO: 0 % (ref 0–0.5)
IRON 24H UR-MRATE: 97 MCG/DL (ref 37–145)
IRON SATN MFR SERPL: 24 % (ref 20–50)
LYMPHOCYTES # BLD AUTO: 1.23 10*3/MM3 (ref 0.7–3.1)
LYMPHOCYTES NFR BLD AUTO: 34.6 % (ref 19.6–45.3)
MCH RBC QN AUTO: 28.9 PG (ref 26.6–33)
MCHC RBC AUTO-ENTMCNC: 32.7 G/DL (ref 31.5–35.7)
MCV RBC AUTO: 88.6 FL (ref 79–97)
MONOCYTES # BLD AUTO: 0.29 10*3/MM3 (ref 0.1–0.9)
MONOCYTES NFR BLD AUTO: 8.2 % (ref 5–12)
NEUTROPHILS NFR BLD AUTO: 1.95 10*3/MM3 (ref 1.7–7)
NEUTROPHILS NFR BLD AUTO: 54.9 % (ref 42.7–76)
NRBC BLD AUTO-RTO: 0 /100 WBC (ref 0–0.2)
PLATELET # BLD AUTO: 216 10*3/MM3 (ref 140–450)
PMV BLD AUTO: 11.6 FL (ref 6–12)
POTASSIUM SERPL-SCNC: 4.3 MMOL/L (ref 3.5–5.2)
PROT SERPL-MCNC: 7.3 G/DL (ref 6–8.5)
RBC # BLD AUTO: 4.63 10*6/MM3 (ref 3.77–5.28)
SODIUM SERPL-SCNC: 138 MMOL/L (ref 136–145)
T4 FREE SERPL-MCNC: 1.25 NG/DL (ref 0.93–1.7)
TIBC SERPL-MCNC: 398 MCG/DL (ref 298–536)
TRANSFERRIN SERPL-MCNC: 267 MG/DL (ref 200–360)
TSH SERPL DL<=0.05 MIU/L-ACNC: 1.33 UIU/ML (ref 0.27–4.2)
WBC NRBC COR # BLD: 3.55 10*3/MM3 (ref 3.4–10.8)

## 2022-02-10 PROCEDURE — 86140 C-REACTIVE PROTEIN: CPT | Performed by: INTERNAL MEDICINE

## 2022-02-10 PROCEDURE — 84466 ASSAY OF TRANSFERRIN: CPT

## 2022-02-10 PROCEDURE — 36415 COLL VENOUS BLD VENIPUNCTURE: CPT | Performed by: INTERNAL MEDICINE

## 2022-02-10 PROCEDURE — 80053 COMPREHEN METABOLIC PANEL: CPT

## 2022-02-10 PROCEDURE — 86235 NUCLEAR ANTIGEN ANTIBODY: CPT

## 2022-02-10 PROCEDURE — 85025 COMPLETE CBC W/AUTO DIFF WBC: CPT

## 2022-02-10 PROCEDURE — 82728 ASSAY OF FERRITIN: CPT

## 2022-02-10 PROCEDURE — 84439 ASSAY OF FREE THYROXINE: CPT | Performed by: INTERNAL MEDICINE

## 2022-02-10 PROCEDURE — 86225 DNA ANTIBODY NATIVE: CPT

## 2022-02-10 PROCEDURE — 83540 ASSAY OF IRON: CPT

## 2022-02-10 PROCEDURE — 86431 RHEUMATOID FACTOR QUANT: CPT

## 2022-02-10 PROCEDURE — 86200 CCP ANTIBODY: CPT

## 2022-02-10 PROCEDURE — 84443 ASSAY THYROID STIM HORMONE: CPT

## 2022-02-10 PROCEDURE — 86038 ANTINUCLEAR ANTIBODIES: CPT

## 2022-02-10 PROCEDURE — 85652 RBC SED RATE AUTOMATED: CPT

## 2022-02-10 PROCEDURE — 82550 ASSAY OF CK (CPK): CPT

## 2022-02-11 LAB — CCP IGA+IGG SERPL IA-ACNC: 5 UNITS (ref 0–19)

## 2022-02-14 LAB
ANA TITR SER IF: POSITIVE {TITER}
CENTROMERE AB TITR SER IF: ABNORMAL {TITER}
CENTROMERE B AB SER-ACNC: 1.2 AI (ref 0–0.9)
CHROMATIN AB SERPL-ACNC: <0.2 AI (ref 0–0.9)
DSDNA AB SER-ACNC: <1 IU/ML (ref 0–9)
ENA JO1 AB SER-ACNC: <0.2 AI (ref 0–0.9)
ENA RNP AB SER-ACNC: <0.2 AI (ref 0–0.9)
ENA SCL70 AB SER-ACNC: <0.2 AI (ref 0–0.9)
ENA SM AB SER-ACNC: <0.2 AI (ref 0–0.9)
ENA SS-A AB SER-ACNC: <0.2 AI (ref 0–0.9)
ENA SS-B AB SER-ACNC: <0.2 AI (ref 0–0.9)
LABORATORY COMMENT REPORT: ABNORMAL
Lab: ABNORMAL
Lab: ABNORMAL

## 2022-06-11 PROBLEM — O09.299 HX OF PREECLAMPSIA, PRIOR PREGNANCY, CURRENTLY PREGNANT: Status: ACTIVE | Noted: 2022-06-11

## 2022-06-11 PROBLEM — Z34.80 PRENATAL CARE, SUBSEQUENT PREGNANCY: Status: ACTIVE | Noted: 2022-06-11

## 2022-06-14 ENCOUNTER — CLINICAL SUPPORT NO REQUIREMENTS (OUTPATIENT)
Dept: PREADMISSION TESTING | Facility: HOSPITAL | Age: 32
End: 2022-06-14

## 2022-06-14 ENCOUNTER — PREP FOR SURGERY (OUTPATIENT)
Dept: OTHER | Facility: HOSPITAL | Age: 32
End: 2022-06-14

## 2022-06-14 ENCOUNTER — OFFICE VISIT (OUTPATIENT)
Dept: OBSTETRICS AND GYNECOLOGY | Facility: CLINIC | Age: 32
End: 2022-06-14

## 2022-06-14 VITALS — WEIGHT: 201 LBS | RESPIRATION RATE: 14 BRPM | BODY MASS INDEX: 32.46 KG/M2

## 2022-06-14 DIAGNOSIS — O02.1 MISSED ABORTION: Primary | ICD-10-CM

## 2022-06-14 DIAGNOSIS — O02.1 MISSED ABORTION: ICD-10-CM

## 2022-06-14 LAB — SARS-COV-2 RNA PNL SPEC NAA+PROBE: NOT DETECTED

## 2022-06-14 PROCEDURE — C9803 HOPD COVID-19 SPEC COLLECT: HCPCS

## 2022-06-14 PROCEDURE — 99214 OFFICE O/P EST MOD 30 MIN: CPT | Performed by: OBSTETRICS & GYNECOLOGY

## 2022-06-14 PROCEDURE — U0004 COV-19 TEST NON-CDC HGH THRU: HCPCS

## 2022-06-14 NOTE — PROGRESS NOTES
Subjective   Chief Complaint   Patient presents with   • Follow-up       Izzy Hernandez is a 32 y.o. year old .  Patient's last menstrual period was 11/15/2021 (approximate).  She presents to initiate prenatal care with no real issue.  Ultrasound done in advance of the appointment shows embryonic demise with a crown-rump length of 10 mm, absent cardiac activity and a subchorionic hemorrhage    The following portions of the patient's history were reviewed and updated as appropriate:current medications and allergies    Social History    Tobacco Use      Smoking status: Never Smoker      Smokeless tobacco: Never Used         Objective   Resp 14   Wt 91.2 kg (201 lb)   LMP 11/15/2021 (Approximate)   Breastfeeding No   BMI 32.46 kg/m²     Lab Review   No data reviewed    Imaging   Pelvic ultrasound images independantly reviewed - CRL 10 mm with no FHT's and ANISA        Assessment   1. Missed   2. A+     Plan   1. Management options reviewed including expectant management or suction D&C  2. Causes for miscarriage reviewed and implications for subsequent pregnancies explained.  Given that she has one normal pregnancy and that 1 miscarriage, no additional risk should she choose to conceive again  3. Following completion of miscarriage would wait at least 1 normal cycle and continue with prenatal vitamins thereafter  4. Risks of expectant management including hemorrhage reviewed.  Risks of suction D including uterine perforation and retained products of conception reviewed.  5. After hearing this information, she wants to proceed with D&C  6. The importance of keeping all planned follow-up and taking all medications as prescribed was emphasized.         This note was electronically signed.    Isaias Wen M.D.  2022    Total time spent today with Izzy  was 30-39 minutes (level 4) - pathophysiology of her presenting problem along with plans for any diagnositc work-up and  treatment.    Part of this note may be an electronic transcription/translation of spoken language to printed text using the Dragon Dictation System.

## 2022-06-14 NOTE — H&P
Izzy Handley  : 1990  MRN: [id number]  CSN: [id number]    History and Physical    Subjective   Izzy Handley is a 32 y.o. year old  who present for suction D & C due to embryonic demise and missed .  Her blood type is A+.    Past Medical History:   Diagnosis Date   • H/O melanoma in situ - on back 2018   • Gestational hypertension, third trimester 10/29/2020     Past Surgical History:   Procedure Laterality Date   • WISDOM TOOTH EXTRACTION     • SKIN CANCER EXCISION  2018    MIS on back   • CHOLECYSTECTOMY WITH INTRAOPERATIVE CHOLANGIOGRAM N/A 2020    Procedure: CHOLECYSTECTOMY LAPAROSCOPIC INTRAOPERATIVE CHOLANGIOGRAM;  Surgeon: Dennis Zhou MD;  Location: [facility];  Service: General;  Laterality: N/A;     OB History    Para Term  AB Living   2 1 1 0 0 1   SAB IAB Ectopic Molar Multiple Live Births   0 0 0 0 0 1      # Outcome Date GA Lbr Akira/2nd Weight Sex Delivery Anes PTL Lv   2 Current            1 Term 20 37w0d 04:59 / 02:17 2935 g (6 lb 7.5 oz) M Vag-Vacuum EPI N ÁLVARO      Name: JOVANNA HANDLEY      Apgar1: 7  Apgar5: 9      Obstetric Comments   2020 - [facility]     Social History    Tobacco Use      Smoking status: Never Smoker      Smokeless tobacco: Never Used      Current Outpatient Medications:   •  Prenatal Vit-Fe Fumarate-FA (PRENATAL VITAMIN PO), Take 1 tablet by mouth Daily., Disp: , Rfl:     No Known Allergies    Review of Systems   Constitutional: Negative for chills, fever and unexpected weight change.   HENT: Negative for ear pain, facial swelling, sinus pressure, sneezing and sore throat.    Respiratory: Negative for cough, shortness of breath and wheezing.    Cardiovascular: Negative for chest pain and palpitations.   Hematological: Does not bruise/bleed easily.         Objective     Vital Signs: See nursing documentation   General: well developed; well nourished  no acute distress   Mental status: Alert and oriented    Heart: Not performed.   Lungs: breathing is unlabored   Abdomen: soft, non-tender; no masses  no umbilical or inguinal hernias are present  no hepato-splenomegaly   Pelvis: Clinical staff was present for exam        Assessment   1. Missed   2. Embryonic demise     Plan   1. Suction D & C  2. I have previously discussed with Izzy the risks of her suction D & C. Risks including intraoperative bleeding, uterine infection, perforation of the uterine cavity, failure to fully  move all the products of conception, laceration of the cervix, catheter induced urinary tract infections and the small risk for deep vein thrombosis were all explained. Additionally, the small risk for reoperation in the event of unanticipated bleeding or surgical injury was discussed.        Isaias Wen MD       (Pt's PCP is Emily Paul APRN)

## 2022-06-15 ENCOUNTER — OUTSIDE FACILITY SERVICE (OUTPATIENT)
Dept: OBSTETRICS AND GYNECOLOGY | Facility: CLINIC | Age: 32
End: 2022-06-15

## 2022-06-15 ENCOUNTER — LAB REQUISITION (OUTPATIENT)
Dept: LAB | Facility: HOSPITAL | Age: 32
End: 2022-06-15

## 2022-06-15 DIAGNOSIS — Z98.890 POST-OPERATIVE STATE: Primary | ICD-10-CM

## 2022-06-15 DIAGNOSIS — O02.1 MISSED ABORTION: ICD-10-CM

## 2022-06-15 PROCEDURE — 59812 TREATMENT OF MISCARRIAGE: CPT | Performed by: OBSTETRICS & GYNECOLOGY

## 2022-06-15 PROCEDURE — 88305 TISSUE EXAM BY PATHOLOGIST: CPT | Performed by: OBSTETRICS & GYNECOLOGY

## 2022-06-15 RX ORDER — HYDROCODONE BITARTRATE AND ACETAMINOPHEN 5; 325 MG/1; MG/1
1-2 TABLET ORAL EVERY 4 HOURS PRN
Qty: 12 TABLET | Refills: 0 | Status: SHIPPED | OUTPATIENT
Start: 2022-06-15 | End: 2022-06-20

## 2022-06-25 NOTE — PROGRESS NOTES
Subjective   Chief Complaint   Patient presents with   • Post-op     Izzy Hernandez is a 32 y.o. year old  presenting to be seen for her post-operative visit.  Currently she reports no problems with eating, bowel movements, voiding, or wound drainage and pain is well controlled.    The pathology results from her procedure are in Izzy's record and are benign.    To do hope to try to conceive as soon as it is feasible.  She remains on a prenatal vitamin.    OTHER THINGS SHE WANTS TO DISCUSS TODAY:  Nothing else    The following portions of the patient's history were reviewed and updated as appropriate:current medications and allergies       Objective   /74   Resp 14   Wt 91.2 kg (201 lb)   Breastfeeding No   BMI 32.46 kg/m²     General:  well developed; well nourished  no acute distress   Abdomen: soft, non-tender; no masses  no umbilical or inguinal hernias are present  no hepato-splenomegaly   Pelvis: Clinical staff was present for exam  External genitalia:  normal appearance of the external genitalia including Bartholin's and [place]'s glands.  :  urethral meatus normal;  Vaginal:  normal pink mucosa without prolapse or lesions.  Cervix:  normal appearance.  Uterus:  normal size, shape and consistency.  Adnexa:  normal bimanual exam of the adnexa.  Rectal:  digital rectal exam not performed; anus visually normal appearing.          Assessment   1. S/P suction D & C     Plan   1. May return to full activity with no restrictions  2. Folic acid for the prevention of neural tube defects was discussed.  At least 0.4 mg should be taken preconceptionally to reduce the risk.  It was explained that this may reduce the baseline incidence of neural tube defect by as much as 65%.  Folic acid can be found in OTC multivitamins, OTC prenatal vitamins or breakfast cereal that that contains 100% of the RDA of folate.  3. Would wait for 1 normal spontaneous menses to try to conceive again  4. The  importance of keeping all planned follow-up and taking all medications as prescribed was emphasized.  5. Follow up PRN .         This note was electronically signed.    Isaias Wen M.D.  June 28, 2022    Part of this note may be an electronic transcription/translation of spoken language to printed text using the Dragon Dictation System.

## 2022-06-28 ENCOUNTER — OFFICE VISIT (OUTPATIENT)
Dept: OBSTETRICS AND GYNECOLOGY | Facility: CLINIC | Age: 32
End: 2022-06-28

## 2022-06-28 VITALS
BODY MASS INDEX: 32.46 KG/M2 | WEIGHT: 201 LBS | RESPIRATION RATE: 14 BRPM | DIASTOLIC BLOOD PRESSURE: 74 MMHG | SYSTOLIC BLOOD PRESSURE: 116 MMHG

## 2022-06-28 DIAGNOSIS — Z98.890 POST-OPERATIVE STATE: Primary | ICD-10-CM

## 2022-06-28 PROCEDURE — 99024 POSTOP FOLLOW-UP VISIT: CPT | Performed by: OBSTETRICS & GYNECOLOGY

## 2022-12-13 ENCOUNTER — OFFICE VISIT (OUTPATIENT)
Dept: OBSTETRICS AND GYNECOLOGY | Facility: CLINIC | Age: 32
End: 2022-12-13

## 2022-12-13 VITALS
DIASTOLIC BLOOD PRESSURE: 78 MMHG | BODY MASS INDEX: 34.06 KG/M2 | RESPIRATION RATE: 14 BRPM | SYSTOLIC BLOOD PRESSURE: 118 MMHG | WEIGHT: 211 LBS

## 2022-12-13 DIAGNOSIS — Z71.85 VACCINE COUNSELING: ICD-10-CM

## 2022-12-13 DIAGNOSIS — Z01.419 WELL WOMAN EXAM: Primary | ICD-10-CM

## 2022-12-13 DIAGNOSIS — N39.46 MIXED URGE AND STRESS INCONTINENCE: ICD-10-CM

## 2022-12-13 DIAGNOSIS — R14.0 BLOATING: ICD-10-CM

## 2022-12-13 PROBLEM — Z86.006 H/O MELANOMA IN SITU: Status: RESOLVED | Noted: 2018-06-01 | Resolved: 2022-12-13

## 2022-12-13 PROCEDURE — 99395 PREV VISIT EST AGE 18-39: CPT | Performed by: OBSTETRICS & GYNECOLOGY

## 2022-12-13 NOTE — PROGRESS NOTES
Subjective   Chief Complaint   Patient presents with   • Gynecologic Exam     Izzy Hernandez is a 32 y.o. year old  presenting to be seen for her annual exam.  Plans to see [facility] shortly to try and conceive.  Did IUI last time with success.    Just moved into a new home in the [place] area.  It was a new build.  Still working through the details and punch lists but overall very happy.    SEXUAL Hx:  She is currently sexually active.  In the past year there there has been NO new sexual partners.    Condoms are never used.  She would not like to be screened for STD's at today's exam.  Current birth control method: not using any form of contraception because she is currently trying to conceive.  She is happy with her current method of contraception and does not want to discuss alternative methods of contraception.  MENSTRUAL Hx:  Patient's last menstrual period was 2022 (approximate).  In the past 6 months her cycles have been regular, predictable and occur monthly.  Her menstrual flow is typically normal.   Each month on average there are roughly 0 day(s) of very heavy flow.  Intermenstrual bleeding is absent.    Post-coital bleeding is absent.  Dysmenorrhea: is not affecting her activities of daily living  PMS: is not affecting her activities of daily living  Her cycles are not a source of concern for her that she wishes to discuss today.  HEALTH Hx:  She exercises regularly: yes.  She wears her seat belt: yes.  She has concerns about domestic violence: no.  OTHER THINGS SHE WANTS TO DISCUSS TODAY:  Nothing else    The following portions of the patient's history were reviewed and updated as appropriate:problem list, current medications, allergies, past family history, past medical history, past social history and past surgical history.    Social History    Tobacco Use      Smoking status: Never      Smokeless tobacco: Never    Review of Systems  Constitutional POS: nothing reported    NEG:  anorexia or night sweats   Genitourinary POS: RONN is present but it IS NOT effecting her ADL's, frequency, nocturia and it IS effecting her daily living.  She is interested in considering pelvic floor physical therapy.    NEG: dysuria or hematuria      Gastointestinal POS: bloating and constipation (chronic)    NEG: change in bowel habits, melena or reflux symptoms   Integument POS: nothing reported    NEG: moles that are changing in size, shape, color or rashes   Breast POS: nothing reported    NEG: persistent breast lump, skin dimpling or nipple discharge        Objective   /78   Resp 14   Wt 95.7 kg (211 lb)   LMP 12/01/2022 (Approximate)   BMI 34.06 kg/m²     General:  well developed; well nourished  no acute distress   Skin:  No suspicious lesions seen   Thyroid: normal to inspection and palpation   Breasts:  Examined in supine position  Symmetric without masses or skin dimpling  Nipples normal without inversion, lesions or discharge  There are no palpable axillary nodes   Abdomen: soft, non-tender; no masses  no umbilical or inguinal hernias are present  no hepato-splenomegaly   Pelvis: Clinical staff was present for exam  External genitalia:  normal appearance of the external genitalia including Bartholin's and [place]'s glands.  :  urethral meatus normal;  Vaginal:  normal pink mucosa without prolapse or lesions.  Cervix:  normal appearance.  Uterus:  normal size, shape and consistency.  Adnexa:  normal bimanual exam of the adnexa.  Rectal:  digital rectal exam not performed; anus visually normal appearing.        Assessment   1. Normal GYN exam  2. Mixed urinary incontinence with nocturia impacting activities of daily living.  Patient interested in beginning pelvic floor physical therapy.  3. Constipation with bloating.  This is a longstanding problem and it does require limited additional work-up.  Plans to see primary care about this tomorrow.  4. She is up to date on all relevant gynecologic  and colorectal screenings     Plan   1. Pap was not done today.  I explained to Izzy that the recommendations for Pap smear interval in a low risk patient has lengthened to 3 years time.  I told Izzy she still needs to be seen in our office yearly for a full physical including breast and pelvic exam.  2. Ultrasound needs to be done after her next menses.   3. Referral has been made to [facility] therapy  4. Her vaccine record was reviewed and updated.  5. I discussed with Izzy that she may be behind on needed vaccinations for COVID booster.  She may be able to obtain these vaccinations at her local pharmacy OR speak about obtaining them with her primary care.  If she does obtain her vaccines, I have asked Izzy to let us know the date each vaccine was obtained so that her medical record could be updated in our system.  6. The importance of keeping all planned follow-up and taking all medications as prescribed was emphasized.  7. Follow up for annual exam 1 year if U/S normal           This note was electronically signed.    Isaias Wen M.D.  December 13, 2022    Part of this note may be an electronic transcription/translation of spoken language to printed text using the Dragon Dictation System.

## 2022-12-14 ENCOUNTER — OFFICE VISIT (OUTPATIENT)
Dept: INTERNAL MEDICINE | Facility: CLINIC | Age: 32
End: 2022-12-14

## 2022-12-14 ENCOUNTER — LAB (OUTPATIENT)
Dept: LAB | Facility: HOSPITAL | Age: 32
End: 2022-12-14

## 2022-12-14 VITALS
TEMPERATURE: 98 F | HEIGHT: 67 IN | HEART RATE: 80 BPM | BODY MASS INDEX: 32.96 KG/M2 | SYSTOLIC BLOOD PRESSURE: 104 MMHG | WEIGHT: 210 LBS | DIASTOLIC BLOOD PRESSURE: 70 MMHG

## 2022-12-14 DIAGNOSIS — Z13.29 SCREENING FOR ENDOCRINE DISORDER: ICD-10-CM

## 2022-12-14 DIAGNOSIS — Z13.220 LIPID SCREENING: ICD-10-CM

## 2022-12-14 DIAGNOSIS — Z00.00 ROUTINE MEDICAL EXAM: Primary | ICD-10-CM

## 2022-12-14 DIAGNOSIS — Z13.0 SCREENING FOR DEFICIENCY ANEMIA: ICD-10-CM

## 2022-12-14 DIAGNOSIS — R19.8 ALTERNATING CONSTIPATION AND DIARRHEA: ICD-10-CM

## 2022-12-14 DIAGNOSIS — K30 FUNCTIONAL DYSPEPSIA: ICD-10-CM

## 2022-12-14 LAB
ALBUMIN SERPL-MCNC: 4.4 G/DL (ref 3.5–5.2)
ALBUMIN/GLOB SERPL: 1.8 G/DL
ALP SERPL-CCNC: 47 U/L (ref 39–117)
ALT SERPL W P-5'-P-CCNC: 11 U/L (ref 1–33)
ANION GAP SERPL CALCULATED.3IONS-SCNC: 7 MMOL/L (ref 5–15)
AST SERPL-CCNC: 16 U/L (ref 1–32)
BASOPHILS # BLD AUTO: 0.02 10*3/MM3 (ref 0–0.2)
BASOPHILS NFR BLD AUTO: 0.5 % (ref 0–1.5)
BILIRUB SERPL-MCNC: 0.4 MG/DL (ref 0–1.2)
BUN SERPL-MCNC: 14 MG/DL (ref 6–20)
BUN/CREAT SERPL: 17.1 (ref 7–25)
CALCIUM SPEC-SCNC: 9 MG/DL (ref 8.6–10.5)
CHLORIDE SERPL-SCNC: 105 MMOL/L (ref 98–107)
CHOLEST SERPL-MCNC: 151 MG/DL (ref 0–200)
CO2 SERPL-SCNC: 26 MMOL/L (ref 22–29)
CREAT SERPL-MCNC: 0.82 MG/DL (ref 0.57–1)
DEPRECATED RDW RBC AUTO: 40.6 FL (ref 37–54)
EGFRCR SERPLBLD CKD-EPI 2021: 98.2 ML/MIN/1.73
EOSINOPHIL # BLD AUTO: 0.1 10*3/MM3 (ref 0–0.4)
EOSINOPHIL NFR BLD AUTO: 2.3 % (ref 0.3–6.2)
ERYTHROCYTE [DISTWIDTH] IN BLOOD BY AUTOMATED COUNT: 12.6 % (ref 12.3–15.4)
GLOBULIN UR ELPH-MCNC: 2.4 GM/DL
GLUCOSE SERPL-MCNC: 97 MG/DL (ref 65–99)
HCT VFR BLD AUTO: 39.7 % (ref 34–46.6)
HDLC SERPL-MCNC: 58 MG/DL (ref 40–60)
HGB BLD-MCNC: 13.3 G/DL (ref 12–15.9)
IMM GRANULOCYTES # BLD AUTO: 0.01 10*3/MM3 (ref 0–0.05)
IMM GRANULOCYTES NFR BLD AUTO: 0.2 % (ref 0–0.5)
LDLC SERPL CALC-MCNC: 79 MG/DL (ref 0–100)
LDLC/HDLC SERPL: 1.36 {RATIO}
LYMPHOCYTES # BLD AUTO: 1.19 10*3/MM3 (ref 0.7–3.1)
LYMPHOCYTES NFR BLD AUTO: 27.7 % (ref 19.6–45.3)
MCH RBC QN AUTO: 29.5 PG (ref 26.6–33)
MCHC RBC AUTO-ENTMCNC: 33.5 G/DL (ref 31.5–35.7)
MCV RBC AUTO: 88 FL (ref 79–97)
MONOCYTES # BLD AUTO: 0.36 10*3/MM3 (ref 0.1–0.9)
MONOCYTES NFR BLD AUTO: 8.4 % (ref 5–12)
NEUTROPHILS NFR BLD AUTO: 2.61 10*3/MM3 (ref 1.7–7)
NEUTROPHILS NFR BLD AUTO: 60.9 % (ref 42.7–76)
NRBC BLD AUTO-RTO: 0 /100 WBC (ref 0–0.2)
PLATELET # BLD AUTO: 234 10*3/MM3 (ref 140–450)
PMV BLD AUTO: 11.2 FL (ref 6–12)
POTASSIUM SERPL-SCNC: 4.4 MMOL/L (ref 3.5–5.2)
PROT SERPL-MCNC: 6.8 G/DL (ref 6–8.5)
RBC # BLD AUTO: 4.51 10*6/MM3 (ref 3.77–5.28)
SODIUM SERPL-SCNC: 138 MMOL/L (ref 136–145)
TRIGL SERPL-MCNC: 72 MG/DL (ref 0–150)
TSH SERPL DL<=0.05 MIU/L-ACNC: 1.1 UIU/ML (ref 0.27–4.2)
VLDLC SERPL-MCNC: 14 MG/DL (ref 5–40)
WBC NRBC COR # BLD: 4.29 10*3/MM3 (ref 3.4–10.8)

## 2022-12-14 PROCEDURE — 99395 PREV VISIT EST AGE 18-39: CPT | Performed by: NURSE PRACTITIONER

## 2022-12-14 PROCEDURE — 80053 COMPREHEN METABOLIC PANEL: CPT

## 2022-12-14 PROCEDURE — 80061 LIPID PANEL: CPT

## 2022-12-14 PROCEDURE — 85025 COMPLETE CBC W/AUTO DIFF WBC: CPT

## 2022-12-14 PROCEDURE — 84443 ASSAY THYROID STIM HORMONE: CPT

## 2022-12-14 NOTE — PROGRESS NOTES
Izzy Hernandez  1990  [id number]  Patient Care Team:  Emily Paul APRN as PCP - General (Internal Medicine)  Isaias Wen MD as Obstetrician (Obstetrics and Gynecology)    Izzy Hernandez is a 32 y.o. pleasant female here today for annual physical.  Chief Complaint   Patient presents with   • Annual Exam       HPI:   Izzy is here for routine physical.  She is doing well overall.    Miscarriage  She reports she had a miscarriage in the summer of 2021. She notes this was her second pregnancy. She states she has a 2-year-old son. She notes she had to do fertility treatments with him. She states that they got pregnant naturally in the summer and then lost it. She states that she will try again in 01/2023.    GI: The patient states that she is doing well. She states that she is still having some gastric issues. She notes that some days it is diarrhea and some days it is constipation. She states that she can not figure out what is triggering it. She reports that she tried to cut out gluten to see if that helped some of the issues. She notes that she is taking fiber every day. She notes that she started fiber after her appointment last year in 12/2023. She states that it has improved a little bit. She notes that she is using prenatal vitamins.    The patient states that she has heartburn consistently. She reports that she takes a Tums before bed and then goes to sleep. She states that it usually effects her in the evening. She notes that she has not tried probiotics in the past. She states that she thinks this all started after her cholecystectomy.  She states that she has been working out 2 to 3 times a week. She states that she has a .  She states that she has not lost any weight. She reports that she has done programs with a dietitian (Laxmi Tate) a couple of years ago.  She was successful on that program.  She states that she did really well with it.     Health  "maintenance  The patient states that she is having menstrual cycles every month. She states that they are heavier than before she had a baby. She states that her gynecologist is sending her for an ultrasound next month, 01/2023.    Past Medical History:   Diagnosis Date   • Gestational hypertension, third trimester 10/29/2020   • H/O melanoma in situ - on back 06/2018     Past Surgical History:   Procedure Laterality Date   • CHOLECYSTECTOMY WITH INTRAOPERATIVE CHOLANGIOGRAM N/A 11/25/2020    Procedure: CHOLECYSTECTOMY LAPAROSCOPIC INTRAOPERATIVE CHOLANGIOGRAM;  Surgeon: Dennis Zhou MD;  Location: [facility];  Service: General;  Laterality: N/A;   • D & C WITH SUCTION  06/15/2022   • SKIN CANCER EXCISION  06/2018    MIS on back   • WISDOM TOOTH EXTRACTION  2012     Family History   Problem Relation Age of Onset   • Diabetes Father      Social History     Tobacco Use   Smoking Status Never   Smokeless Tobacco Never     No Known Allergies    Current Outpatient Medications:   •  Prenatal Vit-Fe Fumarate-FA (PRENATAL VITAMIN PO), Take 1 tablet by mouth Daily., Disp: , Rfl:     Review of Systems   Review of systems was completed, and pertinent findings are noted in the HPI.      /70 (BP Location: Left arm, Patient Position: Sitting, Cuff Size: Large Adult)   Pulse 80   Temp 98 °F (36.7 °C) (Temporal)   Ht 170 cm (66.93\")   Wt 95.3 kg (210 lb)   LMP 12/01/2022 (Approximate)   BMI 32.96 kg/m²     Physical Exam  Vitals reviewed.   Constitutional:       Appearance: Normal appearance. She is well-developed. She is obese.   HENT:      Head: Normocephalic.      Comments: *wearing mask     Right Ear: External ear normal.      Left Ear: External ear normal.   Eyes:      Conjunctiva/sclera: Conjunctivae normal.      Pupils: Pupils are equal, round, and reactive to light.   Cardiovascular:      Rate and Rhythm: Normal rate and regular rhythm.      Pulses: Normal pulses.      Heart sounds: Normal heart sounds. "   Pulmonary:      Effort: Pulmonary effort is normal.      Breath sounds: Normal breath sounds.   Abdominal:      General: Abdomen is flat. Bowel sounds are normal.      Palpations: Abdomen is soft.   Musculoskeletal:         General: Normal range of motion.      Cervical back: Normal range of motion and neck supple.   Skin:     General: Skin is warm and dry.   Neurological:      Mental Status: She is alert and oriented to person, place, and time.   Psychiatric:         Mood and Affect: Mood normal.         Behavior: Behavior normal.         Thought Content: Thought content normal.         Judgment: Judgment normal.         Results Review:  None    PHQ-9 Total Score: 0         Assessment/Plan:    1. Functional dyspepsia  Comments:  I advised the patient to try IB guard daily.  Samples given.  Continue Tums as needed.    2. Health maintenance  Comments:  The patient will have fasting laboratory work performed today.    3. Weight loss  Comments:  I advised patient to see her dietitian again. I advised patient to track her meals.       Diagnoses and all orders for this visit:    1. Routine medical exam (Primary)    2. Functional dyspepsia    3. Alternating constipation and diarrhea    4. Screening for deficiency anemia  -     CBC & Differential; Future  -     Comprehensive Metabolic Panel; Future    5. Lipid screening  -     Lipid Panel; Future    6. Screening for endocrine disorder  -     TSH Rfx On Abnormal To Free T4; Future         Patient Instructions   Preventive Care 21-39 Years Old, Female  Preventive care refers to lifestyle choices and visits with your health care provider that can promote health and wellness. Preventive care visits are also called wellness exams.  What can I expect for my preventive care visit?  Counseling  During your preventive care visit, your health care provider may ask about your:  1. Medical history, including:  ? Past medical problems.  ? Family medical history.  ? Pregnancy  history.  2. Current health, including:  ? Menstrual cycle.  ? Method of birth control.  ? Emotional well-being.  ? Home life and relationship well-being.  ? Sexual activity and sexual health.  3. Lifestyle, including:  ? Alcohol, nicotine or tobacco, and drug use.  ? Access to firearms.  ? Diet, exercise, and sleep habits.  ? Work and work environment.  ? Sunscreen use.  ? Safety issues such as seatbelt and bike helmet use.  Physical exam  Your health care provider may check your:  • Height and weight. These may be used to calculate your BMI (body mass index). BMI is a measurement that tells if you are at a healthy weight.  • Waist circumference. This measures the distance around your waistline. This measurement also tells if you are at a healthy weight and may help predict your risk of certain diseases, such as type 2 diabetes and high blood pressure.  • Heart rate and blood pressure.  • Body temperature.  • Skin for abnormal spots.  What immunizations do I need?  Vaccines are usually given at various ages, according to a schedule. Your health care provider will recommend vaccines for you based on your age, medical history, and lifestyle or other factors, such as travel or where you work.  What tests do I need?  Screening  Your health care provider may recommend screening tests for certain conditions. This may include:  • Pelvic exam and Pap test.  • Lipid and cholesterol levels.  • Diabetes screening. This is done by checking your blood sugar (glucose) after you have not eaten for a while (fasting).  • Hepatitis B test.  • Hepatitis C test.  • HIV (human immunodeficiency virus) test.  • STI (sexually transmitted infection) testing, if you are at risk.  • BRCA-related cancer screening. This may be done if you have a family history of breast, ovarian, tubal, or peritoneal cancers.  Talk with your health care provider about your test results, treatment options, and if necessary, the need for more tests.  Follow  these instructions at home:  Eating and drinking    1. Eat a healthy diet that includes fresh fruits and vegetables, whole grains, lean protein, and low-fat dairy products.  2. Take vitamin and mineral supplements as recommended by your health care provider.  3. Do not drink alcohol if:  ? Your health care provider tells you not to drink.  ? You are pregnant, may be pregnant, or are planning to become pregnant.  4. If you drink alcohol:  ? Limit how much you have to 0-1 drink a day.  ? Know how much alcohol is in your drink. In the [place] one drink equals one 12 oz bottle of beer (355 mL), one 5 oz glass of wine (148 mL), or one 1½ oz glass of hard liquor (44 mL).  Lifestyle  1. Brush your teeth every morning and night with fluoride toothpaste. Floss one time each day.  2. Exercise for at least 30 minutes 5 or more days each week.  3. Do not use any products that contain nicotine or tobacco. These products include cigarettes, chewing tobacco, and vaping devices, such as e-cigarettes. If you need help quitting, ask your health care provider.  4. Do not use drugs.  5. If you are sexually active, practice safe sex. Use a condom or other form of protection to prevent STIs.  6. If you do not wish to become pregnant, use a form of birth control. If you plan to become pregnant, see your health care provider for a prepregnancy visit.  7. Find healthy ways to manage stress, such as:  ? Meditation, yoga, or listening to music.  ? Journaling.  ? Talking to a trusted person.  ? Spending time with friends and family.  8. Minimize exposure to UV radiation to reduce your risk of skin cancer.  Safety  1. Always wear your seat belt while driving or riding in a vehicle.  2. Do not drive:  ? If you have been drinking alcohol. Do not ride with someone who has been drinking.  ? If you have been using any mind-altering substances or drugs.  ? While texting.  ? When you are tired or distracted.  3. Wear a helmet and other protective  equipment during sports activities.  4. If you have firearms in your house, make sure you follow all gun safety procedures.  5. Seek help if you have been physically or sexually abused.  What's next?  • Go to your health care provider once a year for an annual wellness visit.  • Ask your health care provider how often you should have your eyes and teeth checked.  • Stay up to date on all vaccines.  This information is not intended to replace advice given to you by your health care provider. Make sure you discuss any questions you have with your health care provider.  Document Revised: 06/15/2022 Document Reviewed: 06/15/2022  [employer] Patient Education © 2022 [employer] Inc.        Counseling/Anticipatory Guidance:   Plan of care reviewed with patient at the conclusion of today's visit. Education was provided in regards to diagnosis, diet and exercise, cervical cancer screening, self breast exams, breast cancer screening, and the importance of yearly mammograms.   Nutrition, family planning/contraception, physical activity, healthy weight,ways to reduce stress, adequate sleep, injury prevention, misuse of tobacco, alcohol and drugs, sexual behavior and STD's, dental health, mental health, and immunizations.    Management and any prescribed or recommended OTC medications.  Patient verbalizes understanding of and agreement with management plan.    Return in about 1 year (around 12/14/2023) for Medicare Wellness, Labs this visit.    Dictated Utilizing Dragon Dictation.      DEEPIKA Milan      Transcribed from ambient dictation for DEEPIKA Milan by Billy Walker.  12/14/22   10:38 EST    Patient or patient representative verbalized consent to the visit recording.  I have personally performed the services described in this document as transcribed by the above individual, and it is both accurate and complete.

## 2022-12-14 NOTE — PATIENT INSTRUCTIONS
Preventive Care 21-39 Years Old, Female  Preventive care refers to lifestyle choices and visits with your health care provider that can promote health and wellness. Preventive care visits are also called wellness exams.  What can I expect for my preventive care visit?  Counseling  During your preventive care visit, your health care provider may ask about your:  Medical history, including:  Past medical problems.  Family medical history.  Pregnancy history.  Current health, including:  Menstrual cycle.  Method of birth control.  Emotional well-being.  Home life and relationship well-being.  Sexual activity and sexual health.  Lifestyle, including:  Alcohol, nicotine or tobacco, and drug use.  Access to firearms.  Diet, exercise, and sleep habits.  Work and work environment.  Sunscreen use.  Safety issues such as seatbelt and bike helmet use.  Physical exam  Your health care provider may check your:  Height and weight. These may be used to calculate your BMI (body mass index). BMI is a measurement that tells if you are at a healthy weight.  Waist circumference. This measures the distance around your waistline. This measurement also tells if you are at a healthy weight and may help predict your risk of certain diseases, such as type 2 diabetes and high blood pressure.  Heart rate and blood pressure.  Body temperature.  Skin for abnormal spots.  What immunizations do I need?  Vaccines are usually given at various ages, according to a schedule. Your health care provider will recommend vaccines for you based on your age, medical history, and lifestyle or other factors, such as travel or where you work.  What tests do I need?  Screening  Your health care provider may recommend screening tests for certain conditions. This may include:  Pelvic exam and Pap test.  Lipid and cholesterol levels.  Diabetes screening. This is done by checking your blood sugar (glucose) after you have not eaten for a while (fasting).  Hepatitis B  test.  Hepatitis C test.  HIV (human immunodeficiency virus) test.  STI (sexually transmitted infection) testing, if you are at risk.  BRCA-related cancer screening. This may be done if you have a family history of breast, ovarian, tubal, or peritoneal cancers.  Talk with your health care provider about your test results, treatment options, and if necessary, the need for more tests.  Follow these instructions at home:  Eating and drinking    Eat a healthy diet that includes fresh fruits and vegetables, whole grains, lean protein, and low-fat dairy products.  Take vitamin and mineral supplements as recommended by your health care provider.  Do not drink alcohol if:  Your health care provider tells you not to drink.  You are pregnant, may be pregnant, or are planning to become pregnant.  If you drink alcohol:  Limit how much you have to 0-1 drink a day.  Know how much alcohol is in your drink. In the [place] one drink equals one 12 oz bottle of beer (355 mL), one 5 oz glass of wine (148 mL), or one 1½ oz glass of hard liquor (44 mL).  Lifestyle  Brush your teeth every morning and night with fluoride toothpaste. Floss one time each day.  Exercise for at least 30 minutes 5 or more days each week.  Do not use any products that contain nicotine or tobacco. These products include cigarettes, chewing tobacco, and vaping devices, such as e-cigarettes. If you need help quitting, ask your health care provider.  Do not use drugs.  If you are sexually active, practice safe sex. Use a condom or other form of protection to prevent STIs.  If you do not wish to become pregnant, use a form of birth control. If you plan to become pregnant, see your health care provider for a prepregnancy visit.  Find healthy ways to manage stress, such as:  Meditation, yoga, or listening to music.  Journaling.  Talking to a trusted person.  Spending time with friends and family.  Minimize exposure to UV radiation to reduce your risk of skin  cancer.  Safety  Always wear your seat belt while driving or riding in a vehicle.  Do not drive:  If you have been drinking alcohol. Do not ride with someone who has been drinking.  If you have been using any mind-altering substances or drugs.  While texting.  When you are tired or distracted.  Wear a helmet and other protective equipment during sports activities.  If you have firearms in your house, make sure you follow all gun safety procedures.  Seek help if you have been physically or sexually abused.  What's next?  Go to your health care provider once a year for an annual wellness visit.  Ask your health care provider how often you should have your eyes and teeth checked.  Stay up to date on all vaccines.  This information is not intended to replace advice given to you by your health care provider. Make sure you discuss any questions you have with your health care provider.  Document Revised: 06/15/2022 Document Reviewed: 06/15/2022  Elsevier Patient Education © 2022 [employer] Inc.

## 2023-04-21 ENCOUNTER — TRANSCRIBE ORDERS (OUTPATIENT)
Dept: LAB | Facility: HOSPITAL | Age: 33
End: 2023-04-21
Payer: COMMERCIAL

## 2023-04-21 ENCOUNTER — LAB (OUTPATIENT)
Dept: LAB | Facility: HOSPITAL | Age: 33
End: 2023-04-21
Payer: COMMERCIAL

## 2023-04-21 DIAGNOSIS — Z32.00 PREGNANCY EXAMINATION OR TEST, PREGNANCY UNCONFIRMED: Primary | ICD-10-CM

## 2023-04-21 DIAGNOSIS — Z32.00 PREGNANCY EXAMINATION OR TEST, PREGNANCY UNCONFIRMED: ICD-10-CM

## 2023-04-21 LAB — HCG INTACT+B SERPL-ACNC: 487 MIU/ML

## 2023-04-21 PROCEDURE — 84702 CHORIONIC GONADOTROPIN TEST: CPT

## 2023-04-21 PROCEDURE — 36415 COLL VENOUS BLD VENIPUNCTURE: CPT

## 2023-04-21 PROCEDURE — 84144 ASSAY OF PROGESTERONE: CPT

## 2023-04-22 LAB — PROGEST SERPL-MCNC: 25.1 NG/ML

## 2023-04-24 ENCOUNTER — TRANSCRIBE ORDERS (OUTPATIENT)
Dept: LAB | Facility: HOSPITAL | Age: 33
End: 2023-04-24
Payer: COMMERCIAL

## 2023-04-24 ENCOUNTER — LAB (OUTPATIENT)
Dept: LAB | Facility: HOSPITAL | Age: 33
End: 2023-04-24
Payer: COMMERCIAL

## 2023-04-24 DIAGNOSIS — Z32.01 PREGNANCY EXAMINATION OR TEST, POSITIVE RESULT: Primary | ICD-10-CM

## 2023-04-24 DIAGNOSIS — Z32.01 PREGNANCY EXAMINATION OR TEST, POSITIVE RESULT: ICD-10-CM

## 2023-04-24 LAB — HCG INTACT+B SERPL-ACNC: 1434 MIU/ML

## 2023-04-24 PROCEDURE — 36415 COLL VENOUS BLD VENIPUNCTURE: CPT

## 2023-04-24 PROCEDURE — 84702 CHORIONIC GONADOTROPIN TEST: CPT

## 2023-06-03 PROBLEM — O09.299 HX OF PREECLAMPSIA, PRIOR PREGNANCY, CURRENTLY PREGNANT: Status: ACTIVE | Noted: 2023-06-03

## 2023-06-03 PROBLEM — Z34.80 PRENATAL CARE, SUBSEQUENT PREGNANCY: Status: ACTIVE | Noted: 2023-06-03

## 2023-06-06 ENCOUNTER — INITIAL PRENATAL (OUTPATIENT)
Dept: OBSTETRICS AND GYNECOLOGY | Facility: CLINIC | Age: 33
End: 2023-06-06
Payer: COMMERCIAL

## 2023-06-06 VITALS — SYSTOLIC BLOOD PRESSURE: 114 MMHG | WEIGHT: 209 LBS | DIASTOLIC BLOOD PRESSURE: 72 MMHG | BODY MASS INDEX: 32.8 KG/M2

## 2023-06-06 DIAGNOSIS — Z34.81 PRENATAL CARE, SUBSEQUENT PREGNANCY IN FIRST TRIMESTER: Primary | ICD-10-CM

## 2023-06-06 DIAGNOSIS — O09.299 HX OF PREECLAMPSIA, PRIOR PREGNANCY, CURRENTLY PREGNANT: ICD-10-CM

## 2023-06-06 LAB
2ND TRIMESTER 4 SCREEN SERPL-IMP: NORMAL
AFP INTERP SERPL-IMP: NORMAL
EXTERNAL NIPT: NORMAL

## 2023-06-06 PROCEDURE — 0501F PRENATAL FLOW SHEET: CPT | Performed by: OBSTETRICS & GYNECOLOGY

## 2023-06-06 RX ORDER — ASPIRIN 81 MG/1
81 TABLET ORAL DAILY
Start: 2023-06-06

## 2023-06-06 RX ORDER — LANOLIN ALCOHOL/MO/W.PET/CERES
50 CREAM (GRAM) TOPICAL DAILY
COMMUNITY

## 2023-06-06 NOTE — PROGRESS NOTES
Subjective   Chief Complaint   Patient presents with    Initial Prenatal Visit       Izzy Hernandez is a 33 y.o. year old .  Patient's last menstrual period was 2023.  She presents to be seen to initiate prenatal care.     Social History    Tobacco Use      Smoking status: Never      Smokeless tobacco: Never      The following portions of the patient's history were reviewed and updated as appropriate:vital signs, allergies, current medications, past medical history, past social history, past surgical history, and problem list.    Objective   Lab Review   No data reviewed    Imaging   Pelvic ultrasound report    Assessment & Plan   ASSESSMENT  33 y.o. year old  at 11w0d  Supervision of low risk pregnancy    PLAN  The problem list for pregnancy was initiated today  Tests ordered today:  Orders Placed This Encounter   Procedures    Urine Culture - Urine, Urine, Clean Catch     Standing Status:   Future    Chlamydia trachomatis, Neisseria gonorrhoeae, PCR w/ confirmation - Urine, Urine, Clean Catch     Standing Status:   Future     Order Specific Question:   Release to patient     Answer:   Routine Release    Obstetric Panel     Standing Status:   Future     Order Specific Question:   Release to patient     Answer:   Routine Release    HIV-1 / O / 2 Ag / Antibody 4th Generation     Standing Status:   Future     Order Specific Question:   Release to patient     Answer:   Routine Release    Urine Drug Screen - Urine, Clean Catch     Please confirm all positive UDS     Standing Status:   Future     Order Specific Question:   Release to patient     Answer:   Routine Release     Testing for GC / Chlamydia / trichomonas was done today  Genetic testing reviewed: NIPT (Panorama)  exercise in pregnancy, nutrition in pregnancy, weight gain in pregnancy, work and travel restrictions during pregnancy, list of OTC medications acceptable in pregnancy, and call coverage groups    Total time spent today  with Izzy  was 30-39 minutes (level 4).  Off this time, 100% was spent face-to-face time coordinating care, answering her questions and counseling regarding exercise in pregnancy, nutrition in pregnancy, weight gain in pregnancy, work and travel restrictions during pregnancy, list of OTC medications acceptable in pregnancy and call coverage groups.    Follow up: 4 week(s)       This note was electronically signed.    Isaias Wen MD  June 6, 2023

## 2023-06-07 ENCOUNTER — LAB (OUTPATIENT)
Dept: LAB | Facility: HOSPITAL | Age: 33
End: 2023-06-07
Payer: COMMERCIAL

## 2023-06-07 DIAGNOSIS — Z34.81 PRENATAL CARE, SUBSEQUENT PREGNANCY IN FIRST TRIMESTER: ICD-10-CM

## 2023-06-07 LAB
ABO GROUP BLD: NORMAL
AMPHET+METHAMPHET UR QL: NEGATIVE
AMPHETAMINES UR QL: NEGATIVE
BARBITURATES UR QL SCN: NEGATIVE
BASOPHILS # BLD AUTO: 0.01 10*3/MM3 (ref 0–0.2)
BASOPHILS NFR BLD AUTO: 0.2 % (ref 0–1.5)
BENZODIAZ UR QL SCN: NEGATIVE
BLD GP AB SCN SERPL QL: NEGATIVE
BUPRENORPHINE SERPL-MCNC: NEGATIVE NG/ML
CANNABINOIDS SERPL QL: NEGATIVE
COCAINE UR QL: NEGATIVE
DEPRECATED RDW RBC AUTO: 38.5 FL (ref 37–54)
EOSINOPHIL # BLD AUTO: 0.06 10*3/MM3 (ref 0–0.4)
EOSINOPHIL NFR BLD AUTO: 1 % (ref 0.3–6.2)
ERYTHROCYTE [DISTWIDTH] IN BLOOD BY AUTOMATED COUNT: 12 % (ref 12.3–15.4)
FENTANYL UR-MCNC: NEGATIVE NG/ML
HCT VFR BLD AUTO: 39.3 % (ref 34–46.6)
HGB BLD-MCNC: 13.3 G/DL (ref 12–15.9)
IMM GRANULOCYTES # BLD AUTO: 0.02 10*3/MM3 (ref 0–0.05)
IMM GRANULOCYTES NFR BLD AUTO: 0.3 % (ref 0–0.5)
LYMPHOCYTES # BLD AUTO: 1.39 10*3/MM3 (ref 0.7–3.1)
LYMPHOCYTES NFR BLD AUTO: 24.1 % (ref 19.6–45.3)
MCH RBC QN AUTO: 29.5 PG (ref 26.6–33)
MCHC RBC AUTO-ENTMCNC: 33.8 G/DL (ref 31.5–35.7)
MCV RBC AUTO: 87.1 FL (ref 79–97)
METHADONE UR QL SCN: NEGATIVE
MONOCYTES # BLD AUTO: 0.34 10*3/MM3 (ref 0.1–0.9)
MONOCYTES NFR BLD AUTO: 5.9 % (ref 5–12)
NEUTROPHILS NFR BLD AUTO: 3.95 10*3/MM3 (ref 1.7–7)
NEUTROPHILS NFR BLD AUTO: 68.5 % (ref 42.7–76)
NRBC BLD AUTO-RTO: 0 /100 WBC (ref 0–0.2)
OPIATES UR QL: NEGATIVE
OXYCODONE UR QL SCN: NEGATIVE
PCP UR QL SCN: NEGATIVE
PLATELET # BLD AUTO: 233 10*3/MM3 (ref 140–450)
PMV BLD AUTO: 11.8 FL (ref 6–12)
PROPOXYPH UR QL: NEGATIVE
RBC # BLD AUTO: 4.51 10*6/MM3 (ref 3.77–5.28)
RH BLD: POSITIVE
TRICYCLICS UR QL SCN: NEGATIVE
WBC NRBC COR # BLD: 5.77 10*3/MM3 (ref 3.4–10.8)

## 2023-06-07 PROCEDURE — 86803 HEPATITIS C AB TEST: CPT

## 2023-06-07 PROCEDURE — 36415 COLL VENOUS BLD VENIPUNCTURE: CPT

## 2023-06-07 PROCEDURE — 87086 URINE CULTURE/COLONY COUNT: CPT

## 2023-06-07 PROCEDURE — 86762 RUBELLA ANTIBODY: CPT

## 2023-06-07 PROCEDURE — 80081 OBSTETRIC PANEL INC HIV TSTG: CPT

## 2023-06-07 PROCEDURE — 87591 N.GONORRHOEAE DNA AMP PROB: CPT

## 2023-06-07 PROCEDURE — 80307 DRUG TEST PRSMV CHEM ANLYZR: CPT

## 2023-06-07 PROCEDURE — 87491 CHLMYD TRACH DNA AMP PROBE: CPT

## 2023-06-08 LAB
BACTERIA SPEC AEROBE CULT: NO GROWTH
HBV SURFACE AG SERPL QL IA: NORMAL
HCV AB SER DONR QL: NORMAL
HIV1+2 AB SER QL: NORMAL
RPR SER QL: NORMAL
RUBV IGG SERPL IA-ACNC: 4.69 INDEX

## 2023-06-13 LAB
C TRACH RRNA SPEC QL NAA+PROBE: NEGATIVE
N GONORRHOEA RRNA SPEC QL NAA+PROBE: NEGATIVE

## 2023-08-11 ENCOUNTER — ROUTINE PRENATAL (OUTPATIENT)
Dept: OBSTETRICS AND GYNECOLOGY | Facility: CLINIC | Age: 33
End: 2023-08-11
Payer: COMMERCIAL

## 2023-08-11 VITALS — DIASTOLIC BLOOD PRESSURE: 68 MMHG | BODY MASS INDEX: 33.12 KG/M2 | WEIGHT: 211 LBS | SYSTOLIC BLOOD PRESSURE: 112 MMHG

## 2023-08-11 DIAGNOSIS — O09.299 HX OF PREECLAMPSIA, PRIOR PREGNANCY, CURRENTLY PREGNANT: ICD-10-CM

## 2023-08-11 DIAGNOSIS — Z34.82 PRENATAL CARE, SUBSEQUENT PREGNANCY IN SECOND TRIMESTER: Primary | ICD-10-CM

## 2023-08-11 RX ORDER — OMEPRAZOLE 20 MG/1
20 CAPSULE, DELAYED RELEASE ORAL DAILY
Start: 2023-08-11

## 2023-08-11 NOTE — PROGRESS NOTES
Chief Complaint   Patient presents with    Routine Prenatal Visit       HPI: Izzy is a  currently at 20w3d who today reports the following:  Contractions - No; Leaking - No; Vaginal bleeding - No; Heartburn - improved as compared to the prior visit.    ROS:  GI: Nausea - improved as compared to the prior visit ; Constipation - No; Diarrhea - No    Neuro: Headache - No ; Visual change - No    Respiratory: Cough - No; SOB - No; fever - No     EXAM:  Vitals: See prenatal flowsheet   Abdomen: See prenatal flowsheet   Urine glucose/protein: See prenatal flowsheet   Pelvic: See prenatal flowsheet     Lab Results   Component Value Date    ABO A 2023    RH Positive 2023    ABSCRN Negative 2023       MDM:  Impression: Supervision of high risk pregnancy  GERD in pregnancy   Tests ordered/done today: U/S for anatomic screening - anatomy was not completely seen today   Counseling: Prenatal labs reviewed  Need to continue with PNV's  OTC medical options for her GERD   Tests ordered today for her next visit:   U/S to complete the anatomic screening

## 2023-09-14 ENCOUNTER — ROUTINE PRENATAL (OUTPATIENT)
Dept: OBSTETRICS AND GYNECOLOGY | Facility: CLINIC | Age: 33
End: 2023-09-14
Payer: COMMERCIAL

## 2023-09-14 VITALS — DIASTOLIC BLOOD PRESSURE: 70 MMHG | SYSTOLIC BLOOD PRESSURE: 118 MMHG | BODY MASS INDEX: 33.9 KG/M2 | WEIGHT: 216 LBS

## 2023-09-14 DIAGNOSIS — Z34.82 PRENATAL CARE, SUBSEQUENT PREGNANCY IN SECOND TRIMESTER: Primary | ICD-10-CM

## 2023-09-14 DIAGNOSIS — Z71.85 VACCINE COUNSELING: ICD-10-CM

## 2023-09-14 PROCEDURE — 0502F SUBSEQUENT PRENATAL CARE: CPT | Performed by: OBSTETRICS & GYNECOLOGY

## 2023-09-14 NOTE — PROGRESS NOTES
Chief Complaint   Patient presents with    Routine Prenatal Visit       HPI: Izzy is a  currently at 25w2d who today reports the following:  Contractions - No; Leaking - No; Vaginal bleeding - No; Heartburn - improved as compared to the prior visit.    ROS:  GI: Nausea - improved as compared to the prior visit ; Constipation - No; Diarrhea - No    Neuro: Headache - No ; Visual change - No    Respiratory: Cough - No; SOB - No; fever - No     EXAM:  Vitals: See prenatal flowsheet   Abdomen: See prenatal flowsheet   Urine glucose/protein: See prenatal flowsheet   Pelvic: See prenatal flowsheet     Lab Results   Component Value Date    ABO A 2023    RH Positive 2023    ABSCRN Negative 2023       MDM:  Impression: Supervision of high risk pregnancy  Prior PIH on baby ASA  GERD in pregnancy   Tests ordered/done today: U/S to complete the anatomic screening - anatomy completely seen today   Counseling: Prenatal labs reviewed  Need to continue with PNV's  Childbirth classes  Flu vaccine recommended at any gestational age  Pediatrician selection   Tests ordered today for her next visit:   GCT  HgB

## 2023-09-25 NOTE — PROGRESS NOTES
Chief Complaint   Patient presents with   • Routine Prenatal Visit       HPI: Izzy is a  currently at 28w2d who today reports the following:  Contractions - No; Leaking - No; Vaginal bleeding - No; Heartburn - No.    ROS:  GI: Nausea - No ; Constipation - No; Diarrhea - No    Neuro: Headache - No ; Visual change - No    Respiratory: Cough - No; SOB - No; fever - No     EXAM:  Vitals: See prenatal flowsheet   Abdomen: See prenatal flowsheet   Urine glucose/protein: See prenatal flowsheet   Pelvic: See prenatal flowsheet     Lab Results   Component Value Date    ABO A 2020    RH Positive 2020    ABSCRN Negative 2020       MDM:  Impression: 1. Supervision of high risk pregnancy  2. Anemia in pregnancy   Tests ordered/done today: 1. none   Counselin. Prenatal labs reviewed  2. Need to continue with PNV's  3. Questions answered regarding COVID-19 and revision of office schedule of visits due to pandemic  4. Flu vaccine  5. TDAP vaccination   Tests ordered today for her next visit:   none        not applicable

## 2023-10-05 ENCOUNTER — LAB (OUTPATIENT)
Dept: LAB | Facility: HOSPITAL | Age: 33
End: 2023-10-05
Payer: COMMERCIAL

## 2023-10-05 ENCOUNTER — ROUTINE PRENATAL (OUTPATIENT)
Dept: OBSTETRICS AND GYNECOLOGY | Facility: CLINIC | Age: 33
End: 2023-10-05
Payer: COMMERCIAL

## 2023-10-05 VITALS — BODY MASS INDEX: 35.03 KG/M2 | SYSTOLIC BLOOD PRESSURE: 120 MMHG | DIASTOLIC BLOOD PRESSURE: 74 MMHG | WEIGHT: 223.2 LBS

## 2023-10-05 DIAGNOSIS — Z34.82 PRENATAL CARE, SUBSEQUENT PREGNANCY IN SECOND TRIMESTER: Primary | ICD-10-CM

## 2023-10-05 DIAGNOSIS — Z34.82 PRENATAL CARE, SUBSEQUENT PREGNANCY, SECOND TRIMESTER: Primary | ICD-10-CM

## 2023-10-05 DIAGNOSIS — Z34.82 PRENATAL CARE, SUBSEQUENT PREGNANCY IN SECOND TRIMESTER: ICD-10-CM

## 2023-10-05 PROBLEM — O99.019 MATERNAL ANEMIA IN PREGNANCY, ANTEPARTUM: Status: ACTIVE | Noted: 2023-10-05

## 2023-10-05 LAB
GLUCOSE 1H P 100 G GLC PO SERPL-MCNC: 124 MG/DL (ref 65–139)
GLUCOSE UR STRIP-MCNC: NEGATIVE MG/DL
HCT VFR BLD AUTO: 31 % (ref 34–46.6)
HGB BLD-MCNC: 10.7 G/DL (ref 12–15.9)
PROT UR STRIP-MCNC: NEGATIVE MG/DL

## 2023-10-05 PROCEDURE — 82950 GLUCOSE TEST: CPT

## 2023-10-05 PROCEDURE — 85014 HEMATOCRIT: CPT

## 2023-10-05 PROCEDURE — 85018 HEMOGLOBIN: CPT

## 2023-10-05 PROCEDURE — 82962 GLUCOSE BLOOD TEST: CPT

## 2023-10-05 PROCEDURE — 36415 COLL VENOUS BLD VENIPUNCTURE: CPT

## 2023-10-05 RX ORDER — DOCUSATE SODIUM 100 MG/1
100 CAPSULE, LIQUID FILLED ORAL 2 TIMES DAILY PRN
COMMUNITY

## 2023-10-05 RX ORDER — FERROUS SULFATE 325(65) MG
325 TABLET ORAL
Qty: 60 TABLET | Refills: 4 | Status: SHIPPED | OUTPATIENT
Start: 2023-10-05 | End: 2024-03-03

## 2023-10-05 NOTE — PROGRESS NOTES
Chief Complaint   Patient presents with    Routine Prenatal Visit     No c/c       HPI: Izzy is a  currently at 28w2d who today reports the following:  Contractions - No; Leaking - No; Vaginal bleeding - No; Heartburn - No.    ROS:  GI: Nausea - No ; Constipation - No; Diarrhea - No    Neuro: Headache - No ; Visual change - No    Respiratory: Cough - No; SOB - No; fever - No     EXAM:  Vitals: See prenatal flowsheet   Abdomen: See prenatal flowsheet   Urine glucose/protein: See prenatal flowsheet   Pelvic: See prenatal flowsheet     Lab Results   Component Value Date    ABO A 2023    RH Positive 2023    ABSCRN Negative 2023       MDM:  Impression: Supervision of high risk pregnancy  Prior PIH - on baby ASA   Tests ordered/done today: GCT  HgB   Counseling: Prenatal labs reviewed  Need to continue with PNV's  No additional counseling given - she has no specific complaints or concerns   Tests ordered today for her next visit:   none

## 2023-10-19 ENCOUNTER — ROUTINE PRENATAL (OUTPATIENT)
Dept: OBSTETRICS AND GYNECOLOGY | Facility: CLINIC | Age: 33
End: 2023-10-19
Payer: COMMERCIAL

## 2023-10-19 VITALS — DIASTOLIC BLOOD PRESSURE: 68 MMHG | WEIGHT: 227.2 LBS | BODY MASS INDEX: 35.66 KG/M2 | SYSTOLIC BLOOD PRESSURE: 112 MMHG

## 2023-10-19 DIAGNOSIS — O09.299 HX OF PREECLAMPSIA, PRIOR PREGNANCY, CURRENTLY PREGNANT: ICD-10-CM

## 2023-10-19 DIAGNOSIS — Z34.83 PRENATAL CARE, SUBSEQUENT PREGNANCY IN THIRD TRIMESTER: Primary | ICD-10-CM

## 2023-10-19 DIAGNOSIS — O99.019 MATERNAL ANEMIA IN PREGNANCY, ANTEPARTUM: ICD-10-CM

## 2023-10-19 PROBLEM — O26.03 EXCESS WEIGHT GAIN IN PREGNANCY, THIRD TRIMESTER: Status: ACTIVE | Noted: 2023-10-19

## 2023-10-19 LAB
GLUCOSE UR STRIP-MCNC: NEGATIVE MG/DL
PROT UR STRIP-MCNC: NEGATIVE MG/DL

## 2023-10-19 PROCEDURE — 0502F SUBSEQUENT PRENATAL CARE: CPT | Performed by: OBSTETRICS & GYNECOLOGY

## 2023-10-19 NOTE — PROGRESS NOTES
Chief Complaint   Patient presents with    Routine Prenatal Visit     Struggling with nausea after taking iron, sometimes manageable sometimes not as much       HPI: Izzy is a  currently at 31w2d who today reports the following:  Contractions - No; Leaking - No; Vaginal bleeding -  No; Swelling of extremities - No.    ROS:  GI: Nausea - No; Constipation - No; Diarrhea - No    Neuro: Headache - No; Visual change - No    Respiratory: Cough - No; SOB - No; fever - No     EXAM:  Vitals: See prenatal flowsheet   Abdomen: See prenatal flowsheet   Urine glucose/protein: See prenatal flowsheet   Pelvic: See prenatal flowsheet   MDM:   Impression: Supervision of high risk pregnancy  Anemia in pregnancy  Excess weight gain in pregnancy  GERD in pregnancy - stable with meds   Tests done today: none   Topics discussed: Continue with PNV's  Prenatal labs reviewed  Watch weight  No additional counseling given - she has no specific complaints or concerns   Tests scheduled today for her next visit:   none

## 2023-10-29 ENCOUNTER — E-VISIT (OUTPATIENT)
Dept: FAMILY MEDICINE CLINIC | Facility: TELEHEALTH | Age: 33
End: 2023-10-29
Payer: COMMERCIAL

## 2023-10-29 RX ORDER — DEXTROMETHORPHAN HYDROBROMIDE AND PROMETHAZINE HYDROCHLORIDE 15; 6.25 MG/5ML; MG/5ML
5 SYRUP ORAL 3 TIMES DAILY PRN
Qty: 118 ML | Refills: 0 | Status: SHIPPED | OUTPATIENT
Start: 2023-10-29

## 2023-10-29 RX ORDER — FLUTICASONE PROPIONATE 50 MCG
1 SPRAY, SUSPENSION (ML) NASAL DAILY
Qty: 16 ML | Refills: 0 | Status: SHIPPED | OUTPATIENT
Start: 2023-10-29

## 2023-10-29 NOTE — EXTERNAL PATIENT INSTRUCTIONS
Note   I have prescribed you the following: Promethazine DM-this is a cough syrup. May cause drowsiness. Flonase-this is a nasal steroid that will help with congestion, runny nose, post nasal drip, sinus and allergy symptoms. You may continue over-the-counter medicine approved by your OB/GYN, alternate prescribed cough medicine with over-the-counter but do not take simultaneously.   Diagnosis   Common cold   My name is DEEPIKA Fisher, and I'm a healthcare provider at [facility]. I reviewed your interview, and I see that you may have a cold.   To prevent the spread of illness to others, stay home and away from other people as much as possible while you're sick. When you need to be around other people, consider wearing a face mask.   Here are the main things to know about your current symptoms:    Colds get better on their own.    You can use the medications recommended here to help ease your symptoms.   Based on what you told me in your interview, I haven't prescribed antibiotics. Antibiotics fight bacteria, not viruses. They don't help when you have a viral infection like the common cold. Antibiotics could even make you feel worse as they can cause or worsen nausea, diarrhea, and stomach pain. The following factors suggest that a virus is causing your symptoms:    You've had symptoms for less than 10 days. A bacterial infection is suspected when you've had symptoms longer than 10 days without improvement.    You haven't had a fever. Bacterial infections can cause a high fever.    Your nasal discharge is thin.    Your nasal discharge is clear or white.    In addition to your sore throat, you have other cold symptoms like a stuffy nose or cough. This suggests a viral infection, rather than a bacterial infection such as strep throat.   Pregnancy and breastfeeding   The medications in this treatment plan are safe to take while pregnant.   About your diagnosis   The common cold is a viral infection of the  respiratory tract, which includes the nose, throat, breathing passages, and lungs. These are the key things to know about colds:    There is no vaccine to prevent colds and no cure for a cold.    Some medications can lessen your symptoms.    You can spread a cold to other people.    Over 200 different viruses can cause the common cold. That's why you can get another cold after you've just had one.   Common symptoms include low-grade fever, sneezing, runny nose, congestion, sore throat, and cough. You may also notice fatigue, body aches, difficulty sleeping, or decreased appetite.   What to expect   You should feel better within 5 to 7 days and be back to normal within 14 days.   When to seek care   Call us at [phone]   with any sudden or unexpected symptoms.    Fever that measures over 103F or continues for more than 3 days.    Your headache worsens.    Your throat pain worsens and makes swallowing extremely difficult or impossible.    Hoarse voice or loss of voice lasting longer than 2 weeks.    Your sinus pain continues for 10 days or more, without improvement.    More than 5 episodes of diarrhea in a day.    More than 5 episodes of vomiting in a day.    Coughing up red or bloody mucus.    Severe shortness of breath.    Severe stomach pain.    Symptoms that get better for a few days, and then suddenly get worse.    Severe chest pain   Other treatment    Rest! Your body needs rest to recover and fight the virus.    Drink plenty of water to stay hydrated.    Use a clean humidifier or a cool-mist vaporizer in your room at night. Breathing humid air may help with nasal congestion and cough.    Try non-prescription saline nasal sprays to help your nasal symptoms.    Try using a Neti Pot to flush out your stuffy nose and sinuses. Neti Pots are available at any drugstore without a prescription.    Gargle with salt water several times a day to help your throat feel better. Cough drops and throat lozenges may  provide extra relief. A teaspoon of honey stirred into warm water or weak tea can help soothe a sore throat and cough.    Avoid smoke and air pollution. Smoke can make infections worse.   Prevention    Avoid close contact with other people when you're sick.    Cover your mouth and nose when you cough or sneeze. Use a tissue or cough into your elbow. Make sure that used tissues go directly into the trash.    Avoid touching your eyes, nose, or mouth while you're sick.    Wash your hands often, especially after coughing, sneezing, or blowing your nose. If soap and water are not available, use an alcohol-based hand .    If you or someone in your home or workplace is sick, disinfect commonly used items. This includes door handles, tables, computers, remotes, and pens.    Coronavirus (COVID-19) information   Common symptoms of COVID-19 include fever, cough, shortness of breath, fatigue, muscle or body aches, headaches, new loss of sense of taste or smell, sore throat, stuffy or runny nose, nausea or vomiting, and diarrhea. Most people who get COVID-19 have mild symptoms and can rest at home until they get better. Elderly people and those with chronic medical problems may be at risk for more serious complications.   FAQs about the COVID-19 vaccine   There are four authorized COVID-19 vaccines: Bernard & Bernard's José Vaccine (J&J/José), Moderna, Novavax, and Pfizer-ONEHOPE (Pfizer). The J&J/José and Novavax vaccines are approved for use in people aged 18 and older. The Moderna and Pfizer vaccines are approved for those aged 6 months and older. All four are available at no cost. Even if you don't have health insurance, you can still get the COVID-19 vaccine for free.   Which vaccine is the best? Which vaccine should I get?   All four vaccines are highly effective. Even if you get COVID-19 after being vaccinated, all of the vaccines help prevent severe disease, hospitalization, and complications.   Most  people should get whichever vaccine is first available to them. However, women younger than 50 years old should consider the rare risk of blood clots with low platelets after vaccination with the J&J/José vaccine. This risk hasn't been seen with the other three vaccines.   Are the vaccines safe?   Yes. Hundreds of millions of people in the [place] have already safely received COVID-19 vaccines under the most intense safety monitoring in the history of the [place].   Do I need the vaccine if I've already had COVID?   Yes. Vaccination helps protect you even if you've already had COVID.   If you had COVID-19 and had symptoms, wait to get vaccinated until you've recovered and completed your isolation period.   If you tested positive for COVID-19 but did not have symptoms, you can get vaccinated after 5 full days have passed since you had a positive test, as long as you don't develop symptoms.   How many doses of the vaccine do I need?   Visit www.cdc.gov/coronavirus/2019-ncov/vaccines/stay-up-to-date.html   to find out how to stay up to date with your COVID-19 vaccines.   I'm immunocompromised. How many doses of the vaccine do I need?   For information on how immunocompromised people can stay up to date with their COVID-19 vaccines, visit www.cdc.gov/coronavirus/2019-ncov/vaccines/recommendations/immuno.html  .   What are the common side effects of the vaccine?   A sore arm, tiredness, headache, and muscle pain may occur within two days of getting the vaccine and last a day or two. For the Moderna or Pfizer vaccines, side effects are more common after the second dose. People over the age of 55 are less likely to have side effects than younger people.   After I'm up to date on vaccines, can I still get or spread COVID?   Yes, you can still get COVID, but your disease should be milder. And your risk of serious illness, hospitalization, and complications will be much lower, especially if you're up to date. Unfortunately, you can  still spread COVID if you've been vaccinated. That's why it's important to follow isolation guidelines if you get sick or test positive.   After I'm up to date on vaccines, can I go back to normal?   You should still wear a mask indoors in public if:    It's required by laws, rules, regulations, or local guidance.    You have a weakened immune system.    Your age puts you at increased risk of severe disease.    You have a medical condition that puts you at increased risk of severe disease.    Someone in your household has a weakened immune system, is at increased risk for severe disease, or is unvaccinated.    You're in an area of high transmission.   Where can I get a COVID-19 vaccine?   Visit [facility]'s website for more information. To find a COVID-19 vaccination site near you, visit www.[place].gov/  , call [phone]  , or text your zip code to 379617 ([employer]). Message and data rates may apply.   What about travel?   Travel increases your risk of exposure to COVID-19. For more information, see www.cdc.gov/coronavirus/2019-ncov/travelers/index.html  .   I've had close contact with someone who has COVID. Do I need to quarantine, and if so, for how long?   For the most current answer, including a calculator to determine whether you need to stay home and for how long, visit www.cdc.gov/coronavirus/2019-ncov/your-health/quarantine-isolation.html  .   I've tested positive for COVID. How long do I need to isolate?   For the latest recommendations, including a calculator to determine how long you need to stay home, visit www.cdc.gov/coronavirus/2019-ncov/your-health/quarantine-isolation.html  .   What if I develop symptoms that might be from COVID?   For the latest recommendations on what to do if you're sick, including when to seek emergency care, visit www.cdc.gov/coronavirus/2019-ncov/if-you-are-sick/steps-when-sick.html  .    Flu vaccine information   Who should get a flu vaccine?   Everyone 6 months of  age and older should get a yearly flu vaccine.   When should I get vaccinated?   You should get a flu vaccine by the end of October. Once you're vaccinated, it takes about two weeks for antibodies to develop and protect you against the flu. That's why it's important to get vaccinated as soon as possible.   After October, is it too late to get vaccinated?   No. You should still get vaccinated. As long as the flu viruses are still in your community, flu vaccines will remain available, even into January of next year or later.   Why do I need a flu vaccine EVERY year?   Flu viruses are constantly changing, so flu vaccines are usually updated from one season to the next. Your protection from the flu vaccine also lessens over time.   Is the flu vaccine safe?   Yes. Over the last 50 years, hundreds of millions of Americans have safely received the flu vaccines.   What are the side effects of flu vaccines?   You CANNOT get the flu from a flu vaccine. Common side effects of the flu shot include soreness, redness and/or swelling where the shot was given, low grade fever, and aches. Common side effects of the nasal spray flu vaccine for adults include runny nose, headaches, sore throat, and cough. For children, side effects include wheezing, vomiting, muscle aches, and fever.   Does the flu vaccine increase your risk of getting COVID-19?   No. There is no evidence that getting a flu vaccine increases your risk of getting COVID-19.   Is it safe to get the flu vaccine along with a COVID-19 vaccine?   Yes. It's safe to get the flu vaccine with a COVID-19 vaccine or booster.   Contact your healthcare provider TODAY for details on when and where to get your flu vaccine.   Your provider   Your diagnosis was provided by DEEPIKA Fisher, a member of your trusted care team at [facility].   If you have any questions, call us at [phone]  .

## 2023-10-29 NOTE — E-VISIT TREATED
Chief Complaint: Cold, flu, COVID, sinus, hay fever, or seasonal allergies   Patient introduction   Patient is 33-year-old female with cough, congestion, nasal discharge, itchy nose or sneezing, sore throat, and headache that started 3 to 5 days ago. Regarding date of symptom onset, patient writes: 10/24/2023.   COVID-19 testing history, vaccination status, and exposure:    Has not been tested for COVID-19 since symptom onset.    Patient took a self-test during the interview. Test result was negative.    Received 2 doses of the COVID-19 vaccine (Moderna, Moderna). Received their most recent dose of the vaccine more than 14 days ago.    No high-risk (household) exposure to COVID-19 within the last 14 days.    No travel outside local community within the last 14 days.   Risk factors for severe disease from COVID-19 infection    BMI >= 25.    Pregnancy.   Warning. The following may warrant further investigation:    Patient is pregnant.   General presentation   Symptoms came on gradually.   Fever:    No fever.   Sinus and nasal symptoms:    Nasal discharge.    Itchy nose or sneezing.    Clear nasal drainage.    Nasal drainage is thin.    Postnasal drip.    Sinus pain or pressure on or around the forehead, eyes, and nose.    Patient first noticed sinus pain less than 5 days ago.    Sinus pain is worse with Valsalva.    No history of unhealed nasal septal ulcer/nasal wound.    No history of antibiotic treatment for sinus infection in the last year.    No history of deviated septum or nasal polyps.   Throat symptoms:    Sore throat. Pain is bilateral but worse on the left side.    Has discomfort when swallowing but can swallow liquids and solid foods.   Head and body aches:    Headache described as moderate (4 to 6 on a scale of 1 to 10).    No sweats.    No chills.    No myalgia.    No fatigue.   Cough:    Cough is worse in the morning and at night/while sleeping.    Cough is mildly productive of sputum.    Describes  color of sputum as clear.   Wheezing and shortness of breath:    No COPD diagnosis.    No asthma diagnosis.    No wheezing.    No shortness of breath.    No previous albuterol inhaler use for URIs, bronchitis, or pneumonia.    No previous steroid inhaler use for URIs, bronchitis, or pneumonia.   Chest pain:    No chest pain.   Ear symptoms:    Current symptoms include pressure in the ear(s).   Dizziness:    No dizziness.   Allergies:    No history of allergies.   Flu exposure:    No recent known exposure to a person with a confirmed flu diagnosis.    Received a flu vaccine in the last 2 to 4 weeks.   Patient is taking over-the-counter medications for current symptoms, including acetaminophen, diphenhydramine, guaifenesin, loratadine, and omeprazole.   Review of red flags/alarm symptoms:    No changes in alertness or awareness.    No symptoms suggesting airway obstruction.    No symptoms suggesting intracranial hemorrhage.    No decreased urination.   Risk factors for antibiotic resistance:    Close contact with a child in .   Pregnancy/menstrual status/breastfeeding:    Patient is pregnant.    Not breastfeeding.   Self-exam:    Height: 167 centimeters    Weight: 101.6 kilograms    No tonsillar edema.    Tonsils appear normal.    No palatal petechiae.    Neck lymph nodes feel normal.   Recent antibiotic use:    Has not taken antibiotics for similar symptoms within the past month.   Current medications   Currently taking docusate sodium 100 MG capsule, famotidine 20 MG tablet, ferrous sulfate 325 (65 FE) MG tablet, PRENATAL VITAMIN PO, omeprazole 20 MG capsule, and aspirin 81 MG EC tablet.   Medication allergies   None.   Medication contraindication review   Because patient is pregnant, the following medications will not be prescribed: baloxavir, doxycycline hyclate, doxycycline monohydrate, fluconazole, ibuprofen, acetaminophen-diphenhydramine-phenylephrine, aspirin-chlorpheniramine-phenylephrine, and  pseudoephedrine.   No history of anaphylactic reaction to beta-lactam antibiotics; aspirin triad; blood dyscrasia; bone marrow depression; catecholamine-releasing paraganglioma; coronary artery disease; coagulation disorder; congenital long QT syndrome; depression; electrolyte abnormalities; fungal infection; GI bleeding; GI obstruction; G6PD deficiency; heart arrhythmia; hypertension; mononucleosis; myasthenia; recent myocardial infarction; NSAID-induced asthma/urticaria; Parkinson's disease; pheochromocytoma; porphyria; Reye syndrome; seizure disorder; ulcerative colitis; and urinary retention.   No history of metoclopramide-associated dystonic reaction and tardive dyskinesia.   No known history of amoxicillin-clavulanate-associated cholestatic jaundice or hepatic impairment.   No known history of azithromycin-associated cholestatic jaundice or hepatic impairment.   Past medical history   Immune conditions: No immunocompromising conditions.   No history of cancer.   Social history   Never smoked tobacco.   High-risk household contacts:    Household contact under the age of 5.   Patient did not request an excuse note.   Assessment   Acute nasopharyngitis (common cold).   This is the likely diagnosis based on patient's interview responses, including:    Symptom profile    Gradual onset of symptoms   Plan   Medications:   No medications prescribed.   Education:    Condition and causes    Prevention    Treatment and self-care    When to call provider   ----------   Electronically signed by DEEPIKA Fisher on 2023-10-29 at 08:45AM   ----------   Patient Interview Transcript:   Which of these symptoms are bothering you? Select all that apply.    Cough    Stuffed-up nose or sinuses    Runny nose    Itchy nose or sneezing    Sore throat    Headache   Not selected:    Shortness of breath    Itchy or watery eyes    Loss of smell or taste    Hoarse voice or loss of voice    Fever    Sweats    Chills    Muscle or body  aches    Fatigue or tiredness    Nausea or vomiting    Diarrhea    I don't have any of these symptoms   When did your current symptoms start? Select one.    3 to 5 days ago   Not selected:    Less than 48 hours ago    6 to 9 days ago    10 to 14 days ago    2 to 3 weeks ago    3 to 4 weeks ago    More than a month ago   Do you know the exact date your symptoms started? If so, enter the date as MM/DD/YY. Select one.    Yes (specify): 10/24/2023   Not selected:    No   Did your symptoms come on suddenly or gradually? Select one.    Gradually   Not selected:    Suddenly    I'm not sure   Since your current symptoms started, have you had a viral test for COVID-19? - This includes home self-tests as well as nose swab or saliva tests done at a doctor's office, lab, or testing site. - It does NOT include antibody tests, which use a blood sample to test for past infection. Select one.    No   Not selected:    Yes   Taking a home COVID test can help your provider give you the best care. - If you have a COVID test kit, take the test now before continuing this interview. - If you choose not to take a test or don't have one, you should still continue this interview. Your provider can still help you get care. Do you have a COVID test kit? Select one.    Yes, and I'll take a test now   Not selected:    Yes, but I prefer not to take a test now    No, I don't have a test kit   What is the result of the COVID-19 home test you just took? Select one.    Negative   Not selected:    Positive   Has anyone in your household tested positive for COVID-19 in the past 14 days? Select one.    No   Not selected:    Yes   Have you gotten the COVID-19 vaccine? Select one.    Yes   Not selected:    No   How many total doses of the COVID-19 vaccine have you gotten? This includes boosters as well as additional doses for those who are immunocompromised. Select one.    2 doses   Not selected:    1 dose    3 doses    4 doses    5 doses    6 doses   1st  dose    Moderna   Not selected:    J&J/CoaLogixavax    Pfizer   2nd dose    Moderna   Not selected:    J&J/CoaLogixavax    Pfizer   When did you get your most recent dose of the COVID-19 vaccine?    More than 14 days ago   Not selected:    Less than 48 hours (2 days) ago    48 to 72 hours (3 days) ago    3 to 5 days ago    5 to 7 days ago    7 to 14 days ago   In the last 14 days, have you traveled outside of your local community? This includes travel by car, RV, bus, train, or plane. Travel increases your chances of getting and spreading COVID-19. Select one.    No   Not selected:    Yes   You mentioned having a headache. On a scale of 1 to 10, how severe is your headache pain? Select one.    Moderate (4 to 6)   Not selected:    Mild (1 to 3)    Severe (7 to 9)    Unbearable (10)    The worst headache of my life (10+)   Do you cough so hard that it's made you gag or vomit? By gag, we mean has your coughing made you choke or dry heave? Select all that apply.    Yes, my coughing has made me gag   Not selected:    Yes, my coughing has made me vomit    No   When is your cough the worst? Select all that apply.    In the morning, or when I wake up    At nighttime, or while I'm sleeping   Not selected:    During the day    I haven't noticed a difference depending on time of day   Are you coughing up mucus or phlegm? Select one.    Yes, a little   Not selected:    No, my cough is dry    Yes, a lot   What color is most of the mucus or phlegm that you're coughing up? Select one.    Clear   Not selected:    White/frothy    Yellow or yellowish    Green or greenish    Red or pink    I'm not sure   Do you feel sinus pain or pressure in any of these areas?    In my forehead    Around my eyes    Behind my nose   Not selected:    In my cheeks    In my upper teeth or jaw    No   When did you first notice your sinus pain or pressure? Select one.    Less than 5 days ago   Not selected:    5 to 9 days ago    10 to 14 days  "ago    2 to 4 weeks ago    1 month ago or longer   Does coughing, sneezing, or leaning forward make your sinuses feel worse? Select one.    Yes   Not selected:    No   What color is your nasal drainage? Select one.    Clear   Not selected:    White    Yellow or yellowish    Green or greenish    My nose is stuffed but not draining or running   Is your nasal drainage thick or thin? Select one.    Thin   Not selected:    Thick   Is there any drainage (mucus) going down the back of your throat? This kind of drainage is also called \"postnasal drip.\" It can cause frequent throat clearing. Select one.    Yes   Not selected:    No, not that I know of   Can you swallow liquids and solid foods? A sore throat may be painful when swallowing, but it shouldn't prevent you from swallowing. Select one.    Yes, but it's uncomfortable   Not selected:    Yes, with ease    Yes, but it's painful    It's hard to swallow anything because it feels like liquids and food get stuck in my throat    No, I can't swallow anything, liquid or solid foods   Is your throat pain worse on one side than the other? Select one.    Yes, it's worse on the left side   Not selected:    Yes, it's worse on the right side    No, it's the same on both sides   Which of these best describes your throat pain? Select one.    Pain on both sides, but worse on the left   Not selected:    Severe pain on the left, and little to no pain on the right   Since your symptoms started, have you felt dizzy? Select one.    No   Not selected:    Yes, but I can continue with my regular daily activities    Yes, and it makes it hard to stand, walk, or do daily activities   Do you have chest pain? You might also feel it as discomfort, aching, tightness, or squeezing in the chest. Select one.    No   Not selected:    Yes   Have you urinated at least 3 times in the last 24 hours? Select one.    Yes   Not selected:    No   Changes in alertness or awareness may mean you need emergency " care. Since your symptoms started, have you had any of these? Select all that apply.    None of the above   Not selected:    Confusion    Slurred speech    Not knowing where you are or what day it is    Difficulty staying conscious    Fainting or passing out   Do your symptoms include a whistling sound, or wheezing, when you breathe? Select one.    No   Not selected:    Yes   Do you have any of these symptoms in your ear(s)? Select all that apply.    Pressure   Not selected:    Pain    Fullness    Crackling or popping    Plugged or blocked sensation    None of the above   Are your tonsils larger than usual?    No, not that I can tell   Not selected:    Yes    I've had my tonsils removed   Is there any white or yellow pus on your tonsils?    No, not that I can see   Not selected:    Yes   Are there red spots on the roof of your mouth or the back of your throat?    No, not that I can see   Not selected:    Yes   Are your glands/lymph nodes swollen, or does it hurt when you touch them?    No, not that I can tell   Not selected:    Yes   In the past week, has anyone around you (such as at school, work, or home) had a confirmed diagnosis of the flu? A confirmed diagnosis means that a nose swab was done to verify a flu infection. Select all that apply.    No, not that I know of   Not selected:    I live with someone who has the flu    I've been within touching distance of someone who has the flu    I've walked by, or sat about 3 feet away from, someone who has the flu    I've been in the same building as someone who has the flu   Have you ever been diagnosed with asthma? Select one.    No   Not selected:    Yes   Have you ever been prescribed albuterol to use for wheezing, cough, or shortness of breath caused by a cold, bronchitis, or pneumonia? Albuterol (ProAir, Proventil, Ventolin) is prescribed as an inhaler or a solution to be used with a nebulizer machine. Select one.    No, not that I know of   Not selected:     Yes   Have you ever been prescribed a steroid inhaler to use for wheezing, cough, or shortness of breath caused by a cold, bronchitis, or pneumonia? Some examples of steroid inhalers include Pulmicort, Flovent, Qvar, and Alvesco. Select one.    No, not that I know of   Not selected:    Yes   Have you ever been diagnosed with chronic obstructive pulmonary disease (COPD)? Select one.    No, not that I know of   Not selected:    Yes   In the past month, have you taken antibiotics for similar symptoms? Examples of antibiotics include amoxicillin, amoxicillin-clavulanate (Augmentin), penicillin, cefdinir (Omnicef), doxycycline, and clindamycin (Cleocin). Select one.    No   Not selected:    Yes (specify)   In the last year, how many times were you treated with antibiotics for a sinus infection? Select one.    None   Not selected:    1 to 3 times    4 or more times   Have you been diagnosed with a deviated septum or nasal polyps? The nose is divided into two nostrils by the septum. A crooked septum is called a deviated septum. Nasal polyps are growths inside the nose or sinuses. Select one.    No, not that I know of   Not selected:    Yes, but I had surgery to treat them    Yes, I have a deviated septum    Yes, I have nasal polyps    Yes, I have a deviated septum and nasal polyps   Do you have a sore inside your nose that won't heal? Select one.    No, not that I know of   Not selected:    Yes   Do you have allergies (pollen, dust mites, mold, animal dander)? Select one.    No, not that I know of   Not selected:    Yes   Have you had a flu shot this season? Select one.    Yes, 2 to 4 weeks ago   Not selected:    Yes, less than 2 weeks ago    Yes, 1 to 3 months ago    Yes, 3 to 6 months ago    Yes, more than 6 months ago    No   Are you pregnant? Select one.    Yes   Not selected:    No   Are you breastfeeding? Select one.    No   Not selected:    Yes   The flu and COVID-19 can be more serious for people in certain  groups. The next few questions help us figure out if you or anyone you live with is at higher risk for complications from these infections. Do any of these statements apply to you? Select all that apply.    None of the above   Not selected:    I'm     I'm     I'm Black    I'm  or    Do you smoke tobacco? Select one.    No   Not selected:    Yes, every day    Yes, some days    No, I quit   Do you have any of these conditions? Select all that apply.    None of the above   Not selected:    Chronic lung disease, such as cystic fibrosis or interstitial fibrosis    Heart disease, such as congenital heart disease, congestive heart failure, or coronary artery disease    Disorder of the brain, spinal cord, or nerves and muscles, such as dementia, cerebral palsy, epilepsy, muscular dystrophy, or developmental delay    Metabolic disorder or mitochondrial disease    Cerebrovascular disease, such as stroke or another condition affecting the blood vessels or blood supply to the brain    Down syndrome    Mood disorder, including depression or schizophrenia spectrum disorders    Substance use disorder, such as alcohol, opioid, or cocaine use disorder    Tuberculosis   Do you live in a group care setting? Examples include: - Nursing home - Residential care - Psychiatric treatment facility - Group home - [facility] - [facility] and [facility] - Homeless shelter - Foster care setting Select one.    No   Not selected:    Yes   Are you a healthcare worker? Select one.    No   Not selected:    Yes   People with a very high body mass index (BMI) are at higher risk for developing complications from the flu and severe illness from COVID-19. To determine your BMI, we need to know your weight and height. Please enter your weight (in pounds).    Weight   Please enter your height.    Height   Do you have any of these conditions that can affect the immune system? Scroll to see all options. Select all that  apply.    None of these   Not selected:    History of bone marrow transplant    Chronic kidney disease    Chronic liver disease (including cirrhosis)    HIV/AIDS    Inflammatory bowel disease (Crohn's disease or ulcerative colitis)    Lupus    Moderate to severe plaque psoriasis    Multiple sclerosis    Rheumatoid arthritis    Sickle cell anemia    Alpha or beta thalassemia    History of solid organ transplant (kidney, liver, or heart)    History of spleen removal    An autoimmune disorder not listed here (specify)    A condition requiring treatment with long-term use of oral steroids (such as prednisone, prednisolone, or dexamethasone) (specify)   Have you ever been diagnosed with cancer? Select one.    No   Not selected:    Yes, I have cancer now    Yes, but I'm in remission   Do any of these apply to you? Select all that apply.    I'm in close contact with a child in    Not selected:    I've been hospitalized within the last 5 days    I have diabetes    None of the above   The flu and COVID-19 can be more serious for people in certain groups. Do any of these apply to the people who live with you? Select all that apply.    Under age 5   Not selected:    Over age 65            Black     or     Pregnant    Has given birth, had a miscarriage, had a pregnancy loss, or had an  in the last 2 weeks    None of the above   Does any member of your household have any of these medical conditions? Select all that apply.    None of the above   Not selected:    Asthma    Disorders of the brain, spinal cord, or nerves and muscles, such as dementia, cerebral palsy, epilepsy, muscular dystrophy, or developmental delay    Chronic lung disease, such as COPD or cystic fibrosis    Heart disease, such as congenital heart disease, congestive heart failure, or coronary artery disease    Cerebrovascular disease, such as stroke or another condition affecting the blood vessels or blood  supply to the brain    Blood disorders, such as sickle cell disease    Diabetes    Metabolic disorders such as inherited metabolic disorders or mitochondrial disease    Kidney disorders    Liver disorders    Weakened immune system due to illness or medications such as chemotherapy or steroids    Children under the age of 19 who are on long-term aspirin therapy    Extreme obesity (BMI > 40)   Do you have any of these conditions? Scroll to see all options. Select all that apply.    None of the above   Not selected:    Aspirin triad (also known as Samter's triad or ASA triad)    Asthma or hives from taking aspirin or other NSAIDs, such as ibuprofen or naproxen    Blockage or narrowing of the blood vessels of the heart    Blood clotting disorder    Blood dyscrasia, such anemia, leukemia, lymphoma, or myeloma    Bone marrow depression    Catecholamine-releasing paraganglioma    Congenital long QT syndrome    Depression    Difficulty urinating or completely emptying your bladder    Uncorrected electrolyte abnormalities    Fungal infection    Gastrointestinal (GI) bleeding    Gastrointestinal (GI) obstruction    G6PD deficiency    Recent heart attack    High blood pressure    Irregular heartbeat or heart rhythm    Mononucleosis (mono)    Myasthenia gravis    Parkinson's disease    Pheochromocytoma    Reye syndrome    Seizure disorder    Thyroid disease    Ulcerative colitis   Have you ever had either of these conditions? Select all that apply.    No   Not selected:    Metoclopramide-associated dystonic reaction    Tardive dyskinesia   Just a few more questions about medications, and then you're finished. Have you used any non-prescription medications or nasal sprays for your current symptoms? Examples include saline sprays, decongestants, NyQuil, and Tylenol. Select one.    Yes   Not selected:    No   Which of these non-prescription medications have you tried? Scroll to see all options. Select all that apply.     Acetaminophen (Tylenol)    Diphenhydramine (Benadryl)    Guaifenesin (Mucinex)    Loratadine (Alavert, Claritin)    Omeprazole (Prilosec)   Not selected:    Budesonide (Rhinocort)    Cetirizine (Zyrtec)    Chlorpheniramine (Aller-chlor, Chlor-Trimeton)    Cromolyn (NasalCrom)    Dextromethorphan (Delsym, Robitussin, Vicks DayQuil Cough)    Fexofenadine (Allegra)    Fluticasone (Flonase)    Guaifenesin/dextromethorphan (Delsym DM, Mucinex DM, Robitussin DM)    Ibuprofen (Advil, Motrin, Midol)    Ketotifen (Alaway, Zaditor)    Naphazoline-pheniramine (Naphcon-A, Opcon-A, Visine-A)    Oxymetazoline (Afrin)    Phenylephrine (Sudafed PE)    Triamcinolone (Nasacort)    None of the above   Have you taken any monoamine oxidase inhibitor (MAOI) medications in the last 14 days? Examples include rasagiline (Azilect), selegiline (Eldepryl, Zelapar), isocarboxazid (Marplan), phenelzine (Nardil), and tranylcypromine (Parnate). Select one.    No, not that I know of   Not selected:    Yes   Do you take Kynmobi or Apokyn (apomorphine)? Select one.    No   Not selected:    Yes   Are you still taking these medications listed in your medical record? If you're not taking any of these, click Next. Select all that apply.    docusate sodium 100 MG capsule    famotidine 20 MG tablet    ferrous sulfate 325 (65 FE) MG tablet    PRENATAL VITAMIN PO    omeprazole 20 MG capsule    aspirin 81 MG EC tablet   Are you taking any other medications, vitamins, or supplements? Select one.    No   Not selected:    Yes   Have you ever had an allergic or bad reaction to any medication? Select one.    No   Not selected:    Yes   Are you allergic to milk or to the proteins found in milk (for example, whey or casein)? A milk allergy is different from lactose intolerance. Select one.    No, not that I know of   Not selected:    Yes   Have you ever had jaundice or liver problems as a result of taking amoxicillin-clavulanate (Augmentin)? Jaundice is a condition  in which the skin and the whites of the eyes turn yellow. Select all that apply.    No, not that I know of   Not selected:    Yes, jaundice    Yes, liver problems   Have you ever had jaundice or liver problems as a result of taking azithromycin (Zithromax, Zmax)? Jaundice is a condition in which the skin and the whites of the eyes turn yellow. Select all that apply.    No, not that I know of   Not selected:    Yes, jaundice    Yes, liver problems   Do you need a doctor's note? A doctor's note confirms that you received care today and states when you can return to school or work. It does not contain information about your diagnosis or treatment plan. Your provider will make the final decision on whether to give you a doctor's note and for how long. Doctor's notes CANNOT be backdated. We can't provide medical leave paperwork through this type of visit. If more paperwork is needed to request time off, contact your primary care provider. Select one.    No   Not selected:    Today only (1 day)    Today and tomorrow (2 days)    3 days    5 days    7 days    10 days    14 days   Is there anything you'd like to add about your symptoms? Please limit your comments to the symptoms asked about in this interview. If you include comments about other concerns, your provider may recommend that you be seen in person.   The patient did not enter any additional information.   ----------   Medical history   Medical history data does not currently exist for this patient.

## 2023-10-31 ENCOUNTER — TELEPHONE (OUTPATIENT)
Dept: OBSTETRICS AND GYNECOLOGY | Facility: CLINIC | Age: 33
End: 2023-10-31
Payer: COMMERCIAL

## 2023-10-31 NOTE — TELEPHONE ENCOUNTER
HECTOR        Patient called stating that when she went to bed last night she felt like she couldn't breathe. She continued to feel the same throughout the night and didn't get much sleep.  She took her blood pressure and it was running about 135/86.  She feels a little better today but still feels like she has to catch her breath at times.  Please times.

## 2023-10-31 NOTE — TELEPHONE ENCOUNTER
At this point if she is feeling better and her shortness of air is improving and her blood pressure remains okay, I do not think she needs anything done.  If she starts to feel worse let me know and we can get her back in to be seen

## 2023-11-03 ENCOUNTER — LAB (OUTPATIENT)
Dept: LAB | Facility: HOSPITAL | Age: 33
End: 2023-11-03
Payer: COMMERCIAL

## 2023-11-03 ENCOUNTER — ROUTINE PRENATAL (OUTPATIENT)
Dept: OBSTETRICS AND GYNECOLOGY | Facility: CLINIC | Age: 33
End: 2023-11-03
Payer: COMMERCIAL

## 2023-11-03 ENCOUNTER — TELEPHONE (OUTPATIENT)
Dept: OBSTETRICS AND GYNECOLOGY | Facility: CLINIC | Age: 33
End: 2023-11-03

## 2023-11-03 VITALS — WEIGHT: 227 LBS | BODY MASS INDEX: 35.63 KG/M2 | DIASTOLIC BLOOD PRESSURE: 70 MMHG | SYSTOLIC BLOOD PRESSURE: 116 MMHG

## 2023-11-03 DIAGNOSIS — Z34.83 PRENATAL CARE, SUBSEQUENT PREGNANCY IN THIRD TRIMESTER: Primary | ICD-10-CM

## 2023-11-03 DIAGNOSIS — O09.299 HX OF PREECLAMPSIA, PRIOR PREGNANCY, CURRENTLY PREGNANT: ICD-10-CM

## 2023-11-03 DIAGNOSIS — O99.019 MATERNAL ANEMIA IN PREGNANCY, ANTEPARTUM: ICD-10-CM

## 2023-11-03 DIAGNOSIS — R05.2 SUBACUTE COUGH: ICD-10-CM

## 2023-11-03 LAB — HGB BLD-MCNC: 10.5 G/DL (ref 12–15.9)

## 2023-11-03 PROCEDURE — 85018 HEMOGLOBIN: CPT

## 2023-11-03 RX ORDER — PROMETHAZINE HYDROCHLORIDE AND CODEINE PHOSPHATE 6.25; 1 MG/5ML; MG/5ML
5 SYRUP ORAL EVERY 4 HOURS PRN
Qty: 118 ML | Refills: 0 | Status: SHIPPED | OUTPATIENT
Start: 2023-11-03

## 2023-11-03 RX ORDER — PHENYLEPHRINE HCL/PROMETH HCL 5-6.25MG/5
5 SYRUP ORAL EVERY 4 HOURS PRN
Qty: 120 ML | Refills: 0 | Status: SHIPPED | OUTPATIENT
Start: 2023-11-03 | End: 2023-11-03

## 2023-11-03 RX ORDER — PROMETHAZINE HYDROCHLORIDE, PHENYLEPHRINE HYDROCHLORIDE AND CODEINE PHOSPHATE 6.25; 5; 1 MG/5ML; MG/5ML; MG/5ML
5 SOLUTION ORAL EVERY 4 HOURS PRN
Qty: 118 ML | Refills: 0 | Status: SHIPPED | OUTPATIENT
Start: 2023-11-03 | End: 2023-11-03 | Stop reason: SDUPTHER

## 2023-11-03 NOTE — PROGRESS NOTES
Chief Complaint   Patient presents with    Routine Prenatal Visit       HPI: Izzy is a  currently at 32w3d who today reports the following:  Contractions - No; Leaking - No; Vaginal bleeding -  No; Swelling of extremities - No.  Upper respiratory drainage still persists and cough at night is getting worse    ROS:  GI: Nausea - No; Constipation - No; Diarrhea - No    Neuro: Headache - No; Visual change - No    Respiratory: Cough - No; SOB - No; fever - No     EXAM:  Vitals: See prenatal flowsheet   Abdomen: See prenatal flowsheet   Urine glucose/protein: See prenatal flowsheet   Pelvic: See prenatal flowsheet   MDM:   Impression: Supervision of low risk pregnancy  Anemia in pregnancy  GERD in pregnancy  Viral URI with cough   Tests done today: HgB   Topics discussed: Continue with PNV's  Prenatal labs reviewed  Breast feeding   Tests scheduled today for her next visit:   none      New Medications Ordered This Visit   Medications    Promethazine-Phenyleph-Codeine 6.25-5-10 MG/5ML syrup syrup     Sig: Take 5 mL by mouth Every 4 (Four) Hours As Needed (cough).     Dispense:  118 mL     Refill:  0

## 2023-11-03 NOTE — TELEPHONE ENCOUNTER
Have resent to pharmacy at [facility]    New Medications Ordered This Visit   Medications    Promethazine-Phenyleph-Codeine 6.25-5-10 MG/5ML syrup syrup     Sig: Take 5 mL by mouth Every 4 (Four) Hours As Needed (cough).     Dispense:  118 mL     Refill:  0

## 2023-11-03 NOTE — TELEPHONE ENCOUNTER
Iza called from [facility].    They don't carry promethezine with codeine in it.    Please send the prescription to [facility] retail pharmacy [facility]    I called pt and this is where she prefers.    thanks

## 2023-11-06 ENCOUNTER — TELEPHONE (OUTPATIENT)
Dept: OBSTETRICS AND GYNECOLOGY | Facility: CLINIC | Age: 33
End: 2023-11-06
Payer: COMMERCIAL

## 2023-11-06 NOTE — TELEPHONE ENCOUNTER
PT RETURNING CALL FROM [facility] ABOUT LAB RESULTS.. I COULD NOT FIND ANYTHING IN CHART TO READ TO HER SO TOLD HER SOMEONE WOULD CALL BACK TO GIVE HER THOSE RESULTS.

## 2023-11-16 ENCOUNTER — ROUTINE PRENATAL (OUTPATIENT)
Dept: OBSTETRICS AND GYNECOLOGY | Facility: CLINIC | Age: 33
End: 2023-11-16
Payer: COMMERCIAL

## 2023-11-16 VITALS — WEIGHT: 226 LBS | SYSTOLIC BLOOD PRESSURE: 126 MMHG | DIASTOLIC BLOOD PRESSURE: 74 MMHG | BODY MASS INDEX: 35.4 KG/M2

## 2023-11-16 DIAGNOSIS — Z34.83 PRENATAL CARE, SUBSEQUENT PREGNANCY IN THIRD TRIMESTER: Primary | ICD-10-CM

## 2023-11-16 DIAGNOSIS — O99.019 MATERNAL ANEMIA IN PREGNANCY, ANTEPARTUM: ICD-10-CM

## 2023-11-16 DIAGNOSIS — O09.299 HX OF PREECLAMPSIA, PRIOR PREGNANCY, CURRENTLY PREGNANT: ICD-10-CM

## 2023-11-16 LAB
GLUCOSE UR STRIP-MCNC: NEGATIVE MG/DL
PROT UR STRIP-MCNC: NEGATIVE MG/DL

## 2023-11-16 NOTE — PROGRESS NOTES
Chief Complaint   Patient presents with    Routine Prenatal Visit       HPI: Izzy is a  currently at 34w2d who today reports the following:  Contractions - No; Leaking - No; Vaginal bleeding -  No; Swelling of extremities - No.    ROS:  GI: Nausea - No; Constipation - No; Diarrhea - No    Neuro: Headache - No; Visual change - No    Respiratory: Cough - No; SOB - No; fever - No     EXAM:  Vitals: See prenatal flowsheet   Abdomen: See prenatal flowsheet   Urine glucose/protein: See prenatal flowsheet   Pelvic: See prenatal flowsheet   MDM:   Impression: Supervision of high risk pregnancy  Anemia in pregnancy  URI on ABx  Prior PIH - on baby ASA   Tests done today: none   Topics discussed: Continue with PNV's  Prenatal labs reviewed  No additional counseling given - she has no specific complaints or concerns   Tests scheduled today for her next visit:   none

## 2023-11-30 ENCOUNTER — ROUTINE PRENATAL (OUTPATIENT)
Dept: OBSTETRICS AND GYNECOLOGY | Facility: CLINIC | Age: 33
End: 2023-11-30
Payer: COMMERCIAL

## 2023-11-30 ENCOUNTER — LAB (OUTPATIENT)
Dept: LAB | Facility: HOSPITAL | Age: 33
End: 2023-11-30
Payer: COMMERCIAL

## 2023-11-30 VITALS — DIASTOLIC BLOOD PRESSURE: 70 MMHG | BODY MASS INDEX: 35.87 KG/M2 | WEIGHT: 229 LBS | SYSTOLIC BLOOD PRESSURE: 122 MMHG

## 2023-11-30 DIAGNOSIS — O99.019 MATERNAL ANEMIA IN PREGNANCY, ANTEPARTUM: ICD-10-CM

## 2023-11-30 DIAGNOSIS — L29.9 ITCHING: ICD-10-CM

## 2023-11-30 DIAGNOSIS — Z34.83 PRENATAL CARE, SUBSEQUENT PREGNANCY IN THIRD TRIMESTER: Primary | ICD-10-CM

## 2023-11-30 PROBLEM — O09.299 HX OF PREECLAMPSIA, PRIOR PREGNANCY, CURRENTLY PREGNANT: Status: RESOLVED | Noted: 2023-06-03 | Resolved: 2023-11-30

## 2023-11-30 LAB
ALBUMIN SERPL-MCNC: 3.4 G/DL (ref 3.5–5.2)
ALBUMIN/GLOB SERPL: 1.3 G/DL
ALP SERPL-CCNC: 113 U/L (ref 39–117)
ALT SERPL W P-5'-P-CCNC: 13 U/L (ref 1–33)
ANION GAP SERPL CALCULATED.3IONS-SCNC: 10.1 MMOL/L (ref 5–15)
AST SERPL-CCNC: 12 U/L (ref 1–32)
BILE AC SERPL-SCNC: 2 UMOL/L (ref 0–10)
BILIRUB SERPL-MCNC: 0.3 MG/DL (ref 0–1.2)
BUN SERPL-MCNC: 6 MG/DL (ref 6–20)
BUN/CREAT SERPL: 8.6 (ref 7–25)
CALCIUM SPEC-SCNC: 8.5 MG/DL (ref 8.6–10.5)
CHLORIDE SERPL-SCNC: 106 MMOL/L (ref 98–107)
CO2 SERPL-SCNC: 23.9 MMOL/L (ref 22–29)
CREAT SERPL-MCNC: 0.7 MG/DL (ref 0.57–1)
DEPRECATED RDW RBC AUTO: 40.3 FL (ref 37–54)
EGFRCR SERPLBLD CKD-EPI 2021: 118 ML/MIN/1.73
ERYTHROCYTE [DISTWIDTH] IN BLOOD BY AUTOMATED COUNT: 12.5 % (ref 12.3–15.4)
GLOBULIN UR ELPH-MCNC: 2.7 GM/DL
GLUCOSE SERPL-MCNC: 100 MG/DL (ref 65–99)
GP B STREP RRNA SPEC QL PROBE: NEGATIVE
HCT VFR BLD AUTO: 31.5 % (ref 34–46.6)
HGB BLD-MCNC: 11 G/DL (ref 12–15.9)
MCH RBC QN AUTO: 31.2 PG (ref 26.6–33)
MCHC RBC AUTO-ENTMCNC: 34.9 G/DL (ref 31.5–35.7)
MCV RBC AUTO: 89.2 FL (ref 79–97)
PLATELET # BLD AUTO: 166 10*3/MM3 (ref 140–450)
PMV BLD AUTO: 12.4 FL (ref 6–12)
POTASSIUM SERPL-SCNC: 4.1 MMOL/L (ref 3.5–5.2)
PROT SERPL-MCNC: 6.1 G/DL (ref 6–8.5)
RBC # BLD AUTO: 3.53 10*6/MM3 (ref 3.77–5.28)
SODIUM SERPL-SCNC: 140 MMOL/L (ref 136–145)
WBC NRBC COR # BLD AUTO: 8.95 10*3/MM3 (ref 3.4–10.8)

## 2023-11-30 PROCEDURE — 80053 COMPREHEN METABOLIC PANEL: CPT

## 2023-11-30 PROCEDURE — 82239 BILE ACIDS TOTAL: CPT

## 2023-11-30 PROCEDURE — 36415 COLL VENOUS BLD VENIPUNCTURE: CPT

## 2023-11-30 PROCEDURE — 85027 COMPLETE CBC AUTOMATED: CPT

## 2023-11-30 RX ORDER — HYDROXYZINE 50 MG/1
50 TABLET, FILM COATED ORAL 3 TIMES DAILY PRN
Qty: 90 TABLET | Refills: 0 | Status: SHIPPED | OUTPATIENT
Start: 2023-11-30

## 2023-11-30 NOTE — PROGRESS NOTES
Chief Complaint   Patient presents with    Routine Prenatal Visit       HPI: Izzy is a  currently at 36w2d who today reports the following:  Contractions - No; Leaking - No; Vaginal bleeding -  No; Swelling of extremities - YES.  Has noticed more nausea that in the weeks prior.  Heartburn intermittently is a problem.  Also noticing diffuse itching but not limited to the hands or feet.    ROS:  GI: Nausea - YES; Constipation - No; Diarrhea - No    Neuro: Headache - No; Visual change - No    Respiratory: Cough - No; SOB - No; fever - No     EXAM:  Vitals: See prenatal flowsheet   Abdomen: See prenatal flowsheet   Urine glucose/protein: See prenatal flowsheet   Pelvic: See prenatal flowsheet   MDM:   Impression: Supervision of low risk pregnancy  Anemia in pregnancy stable on prescription iron  Nausea most likely related to silent acid reflux  Itching   Tests done today: GBS testing  Bile acid panel   Topics discussed: Continue with PNV's  Prenatal labs reviewed  Elective induction at 39 weeks offered  Assuming bile acids are normal would treat the itching with Atarax   Tests scheduled today for her next visit:   none      New Medications Ordered This Visit   Medications    hydrOXYzine (ATARAX) 50 MG tablet     Sig: Take 1 tablet by mouth 3 (Three) Times a Day As Needed for Itching.     Dispense:  90 tablet     Refill:  0

## 2023-12-04 ENCOUNTER — DOCUMENTATION (OUTPATIENT)
Dept: OBSTETRICS AND GYNECOLOGY | Facility: CLINIC | Age: 33
End: 2023-12-04
Payer: COMMERCIAL

## 2023-12-07 ENCOUNTER — ROUTINE PRENATAL (OUTPATIENT)
Dept: OBSTETRICS AND GYNECOLOGY | Facility: CLINIC | Age: 33
End: 2023-12-07
Payer: COMMERCIAL

## 2023-12-07 VITALS — BODY MASS INDEX: 35.71 KG/M2 | DIASTOLIC BLOOD PRESSURE: 70 MMHG | WEIGHT: 228 LBS | SYSTOLIC BLOOD PRESSURE: 100 MMHG

## 2023-12-07 DIAGNOSIS — Z34.83 PRENATAL CARE, SUBSEQUENT PREGNANCY IN THIRD TRIMESTER: Primary | ICD-10-CM

## 2023-12-07 LAB
GLUCOSE UR STRIP-MCNC: NEGATIVE MG/DL
PROT UR STRIP-MCNC: NEGATIVE MG/DL

## 2023-12-07 NOTE — PROGRESS NOTES
Chief Complaint   Patient presents with    Routine Prenatal Visit     No c/c       HPI: Izzy is a  currently at 37w2d who today reports the following:  Contractions - No; Leaking - No; Vaginal bleeding -  No; Swelling of extremities - No.    ROS:  GI: Nausea - No; Constipation - No; Diarrhea - No    Neuro: Headache - No; Visual change - No    Respiratory: Cough - No; SOB - No; fever - No     EXAM:  Vitals: See prenatal flowsheet   Abdomen: See prenatal flowsheet   Urine glucose/protein: See prenatal flowsheet   Pelvic: See prenatal flowsheet   MDM:   Impression: Supervision of low risk pregnancy  Anemia in pregnancy   Tests done today: none   Topics discussed: Continue with PNV's  Prenatal labs reviewed  Offered IOL @ 39 weeks - she would like it to be done if possible   Tests scheduled today for her next visit:   none

## 2023-12-08 ENCOUNTER — HOSPITAL ENCOUNTER (OUTPATIENT)
Facility: HOSPITAL | Age: 33
Discharge: HOME | End: 2023-12-08
Attending: OBSTETRICS & GYNECOLOGY | Admitting: OBSTETRICS & GYNECOLOGY
Payer: COMMERCIAL

## 2023-12-08 VITALS
HEART RATE: 88 BPM | DIASTOLIC BLOOD PRESSURE: 94 MMHG | RESPIRATION RATE: 16 BRPM | TEMPERATURE: 97.7 F | SYSTOLIC BLOOD PRESSURE: 138 MMHG | HEIGHT: 67 IN | WEIGHT: 228 LBS | BODY MASS INDEX: 35.79 KG/M2

## 2023-12-08 PROCEDURE — G0463 HOSPITAL OUTPT CLINIC VISIT: HCPCS

## 2023-12-08 PROCEDURE — 99213 OFFICE O/P EST LOW 20 MIN: CPT | Performed by: OBSTETRICS & GYNECOLOGY

## 2023-12-08 PROCEDURE — 59025 FETAL NON-STRESS TEST: CPT | Performed by: OBSTETRICS & GYNECOLOGY

## 2023-12-09 NOTE — H&P
[facility]  Obstetric History and Physical    Chief Complaint   Patient presents with    Back Pain       HPI:      Patient is a 33 y.o. female  currently at 37w3d, who presents with intermittent lower back pain and some abdominal tightening.  She was 3 cm in the office this week and with her last pregnancy, she was induced at 37 weeks due to GHTN and was 5 cm on admission.   She was nervous that she may be in labour.  She denies vaginal leaking and bleeding.    + FM.   Since she has been here, her lower back pain and contractions have all but subsided and she had no cervical change.   She denies headache, vision changes and RUQ pain.         The following portions of the patients history were reviewed and updated as appropriate: current medications, allergies, past medical history, past surgical history, past family history, past social history and problem list .       Prenatal Information:   Prenatal Labs  Lab Results   Component Value Date    HEPBSAG Non-Reactive 2023    ABO A 2023    RH Positive 2023    ABSCRN Negative 2023    HEPCVIRUSABY Non-Reactive 2023         External Prenatal Results       Pregnancy Outside Results - Transcribed From Office Records - See Scanned Records For Details       Test Value Date Time    ABO  A  23 1059    Rh  Positive  23 1059    Antibody Screen  Negative  23 1059    Varicella IgG       Rubella  4.69 index 23 1059    Hgb  11.0 g/dL 23 0909       10.5 g/dL 23 0903       10.7 g/dL 10/05/23 0950       13.3 g/dL 23 1059    Hct  31.5 % 23 0909       31.0 % 10/05/23 0950       39.3 % 23 1059    Glucose Fasting GTT       Glucose Tolerance Test 1 hour       Glucose Tolerance Test 3 hour       Gonorrhea (discrete)  Negative  23 1059    Chlamydia (discrete)  Negative  23 1059    RPR  Non-Reactive  23 1059    VDRL       Syphilis Antibody       HBsAg  Non-Reactive  23 1059     Herpes Simplex Virus PCR       Herpes Simplex VIrus Culture       HIV  Non-Reactive  23 1059    Hep C RNA Quant PCR       Hep C Antibody  Non-Reactive  23 1059    AFP       Group B Strep ^ negative  23     GBS Susceptibility to Clindamycin       GBS Susceptibility to Erythromycin       Fetal Fibronectin       Genetic Testing, Maternal Blood                 Drug Screening       Test Value Date Time    Urine Drug Screen       Amphetamine Screen  Negative  23 1059    Barbiturate Screen  Negative  23 1059    Benzodiazepine Screen  Negative  23 1059    Methadone Screen  Negative  23 1059    Phencyclidine Screen  Negative  23 1059    Opiates Screen  Negative  23 1059    THC Screen  Negative  23 1059    Cocaine Screen       Propoxyphene Screen  Negative  23 1059    Buprenorphine Screen  Negative  23 1059    Methamphetamine Screen       Oxycodone Screen  Negative  23 1059    Tricyclic Antidepressants Screen  Negative  23 1059              Legend    ^: Historical                              Past OB History:       OB History    Para Term  AB Living   3 1 1 0 1 1   SAB IAB Ectopic Molar Multiple Live Births   1 0 0 0 0 1      # Outcome Date GA Lbr Akira/2nd Weight Sex Delivery Anes PTL Lv   3 Current            2 SAB 2022           1 Term 20 37w0d 04:59 / 02:17 2935 g (6 lb 7.5 oz) M Vag-Vacuum EPI N ÁLVARO      Name: Alfredo      Apgar1: 7  Apgar5: 9      Obstetric Comments   2020 - Alfredo       Past Medical History: Past Medical History:   Diagnosis Date    Gestational hypertension, third trimester 10/29/2020    H/O melanoma in situ - on back 2018      Past Surgical History Past Surgical History:   Procedure Laterality Date    CHOLECYSTECTOMY WITH INTRAOPERATIVE CHOLANGIOGRAM N/A 2020    Procedure: CHOLECYSTECTOMY LAPAROSCOPIC INTRAOPERATIVE CHOLANGIOGRAM;  Surgeon: Dennis Zhou MD;  Location: [facility] OR;  Service:  General;  Laterality: N/A;    D & C WITH SUCTION  06/15/2022    SKIN CANCER EXCISION  2018    MIS on back    WISDOM TOOTH EXTRACTION        Family History: Family History   Problem Relation Age of Onset    Diabetes Father       Social History:  reports that she has never smoked. She has never used smokeless tobacco.   reports that she does not currently use alcohol.   reports no history of drug use.        General ROS: Denies fever, HA, shortness of air, muscle weakness, and rashes    Objective       Vital Signs Range for the last 24 hours  Temperature: Temp:  [97.7 °F (36.5 °C)] 97.7 °F (36.5 °C)   Temp Source: Temp src: Oral   BP: BP: (130-138)/(81-94) 138/94   Pulse: Heart Rate:  [88] 88   Respirations: Resp:  [16] 16   SPO2:     O2 Amount (l/min):     O2 Devices     Weight: Weight:  [103 kg (228 lb)] 103 kg (228 lb)     Physical Examination: Alert and oriented X 3  Chest - clear to auscultation, no wheezes, rales or rhonchi, symmetric air entry  Heart - normal rate, S1, S2, no murmurs  Abdomen - no rebound tenderness noted  bowel sounds normal  Gravid uterus, No RUQ pain, No guarding  Extremities - Non-tender bilaterally        Cervix: Exam by:  JIM    Dilation:  3   Effacement:  70   Station:  -2       Fetal Heart Rate Assessment   Method:     Beats/min:     Baseline:  150s   Varibility:  mod   Accels:  y   Decels:  n   Tracing Category:  1     Uterine Assessment   Method:     Frequency (min):  2-3 per hour    Ctx Count in 10 min:     Duration:     Intensity:     Intensity by IUPC:     Resting Tone:     Resting Tone by IUPC:     Ara Units:       Izzy Hernandez, danny  at 37w3d with an BETHANY of 2023, by Last Menstrual Period,  Non stress test.    Indication False labour  Start time 2210  End time 2240  15 x 15 accelerations x 2  Yes  No decelerations  Baseline   150s  Reactive NST  Yes            Assessment:  1.  Intrauterine pregnancy at 37w3d weeks gestation with reactive fetal  status.    2.  False labour not ruptured    Plan:  1. FHTs  Reactive NST  2. No cervical change or signs and symptoms of SROM or labour  3. Reviewed labour guidelines  4. All questions have been answered.  5. Discharge home with routine OB follow-up      Eloy Hendrickson MD  12/8/2023  22:49 EST

## 2023-12-14 ENCOUNTER — ROUTINE PRENATAL (OUTPATIENT)
Dept: OBSTETRICS AND GYNECOLOGY | Facility: CLINIC | Age: 33
End: 2023-12-14
Payer: COMMERCIAL

## 2023-12-14 VITALS — DIASTOLIC BLOOD PRESSURE: 80 MMHG | WEIGHT: 231 LBS | BODY MASS INDEX: 36.18 KG/M2 | SYSTOLIC BLOOD PRESSURE: 136 MMHG

## 2023-12-14 DIAGNOSIS — Z34.83 PRENATAL CARE, SUBSEQUENT PREGNANCY IN THIRD TRIMESTER: Primary | ICD-10-CM

## 2023-12-14 DIAGNOSIS — O99.019 MATERNAL ANEMIA IN PREGNANCY, ANTEPARTUM: ICD-10-CM

## 2023-12-14 LAB
GLUCOSE UR STRIP-MCNC: NEGATIVE MG/DL
PROT UR STRIP-MCNC: NEGATIVE MG/DL

## 2023-12-14 NOTE — PROGRESS NOTES
Chief Complaint   Patient presents with    Routine Prenatal Visit       HPI: Izzy is a  currently at 38w2d who today reports the following:  Contractions - No; Leaking - No; Vaginal bleeding -  No; Swelling of extremities - No.    ROS:  GI: Nausea - No; Constipation - No; Diarrhea - No    Neuro: Headache - No; Visual change - No    Respiratory: Cough - No; SOB - No; fever - No     EXAM:  Vitals: See prenatal flowsheet   Abdomen: See prenatal flowsheet   Urine glucose/protein: See prenatal flowsheet   Pelvic: See prenatal flowsheet   MDM:   Impression: Supervision of low risk pregnancy  Anemia in pregnancy   Tests done today: none   Topics discussed: Continue with PNV's  Prenatal labs reviewed  Labor signs and symptoms  Membranes stripped   Tests scheduled today for her next visit:   none

## 2023-12-15 ENCOUNTER — PREP FOR SURGERY (OUTPATIENT)
Dept: OTHER | Facility: HOSPITAL | Age: 33
End: 2023-12-15
Payer: COMMERCIAL

## 2023-12-15 ENCOUNTER — HOSPITAL ENCOUNTER (OUTPATIENT)
Facility: HOSPITAL | Age: 33
Setting detail: OBSERVATION
Discharge: HOME | End: 2023-12-15
Attending: OBSTETRICS & GYNECOLOGY | Admitting: OBSTETRICS & GYNECOLOGY
Payer: COMMERCIAL

## 2023-12-15 VITALS
TEMPERATURE: 97.6 F | WEIGHT: 231 LBS | HEART RATE: 106 BPM | HEIGHT: 67 IN | DIASTOLIC BLOOD PRESSURE: 89 MMHG | SYSTOLIC BLOOD PRESSURE: 135 MMHG | BODY MASS INDEX: 36.26 KG/M2

## 2023-12-15 PROBLEM — Z34.93 THIRD TRIMESTER PREGNANCY: Status: ACTIVE | Noted: 2023-12-15

## 2023-12-15 PROCEDURE — G0378 HOSPITAL OBSERVATION PER HR: HCPCS

## 2023-12-15 PROCEDURE — 59025 FETAL NON-STRESS TEST: CPT

## 2023-12-15 PROCEDURE — G0463 HOSPITAL OUTPT CLINIC VISIT: HCPCS

## 2023-12-15 NOTE — H&P
"Psychiatric  Obstetric History and Physical    Referring Provider: Isaias Wen MD      Chief Complaint   Patient presents with    Contractions     12/15/23 0730         Subjective     Patient is a 33 y.o. female  currently at 38w3d, who presents with complaint of contractions.  Patient reports mild to moderate contractions 730 this morning without leaking of fluid or vaginal bleeding.  Patient denies recent trauma, fever, reports normal fetal activity.  Patient seen by Dr. Wen yesterday.   course uncomplicated to date.  After arrival, patient reports decreasing uterine activity.  After prolonged observation no cervical change and fetal tracing reassuring.        The following portions of the patients history were reviewed and updated as appropriate: current medications, allergies, past medical history, past surgical history, past family history, past social history, and problem list .       Prenatal Information:   Maternal Prenatal Labs  Blood Type No results found for: \"ABO\"   Rh Status No results found for: \"RH\"   Antibody Screen No results found for: \"ABSCRN\"   Gonnorhea No results found for: \"GCCX\"   Chlamydia No results found for: \"CLAMYDCU\"   RPR No results found for: \"RPR\"   Syphilis Antibody No results found for: \"SYPHILIS\"   Rubella No results found for: \"RUBELLAIGGIN\"   Hepatitis B Surface Antigen No results found for: \"HEPBSAG\"   HIV-1 Antibody No results found for: \"LABHIV1\"   Hepatitis C Antibody No results found for: \"HEPCAB\"   Rapid Urin Drug Screen No results found for: \"AMPMETHU\", \"BARBITSCNUR\", \"LABBENZSCN\", \"LABMETHSCN\", \"LABOPIASCN\", \"THCURSCR\", \"COCAINEUR\", \"AMPHETSCREEN\", \"PROPOXSCN\", \"BUPRENORSCNU\", \"METAMPSCNUR\", \"OXYCODONESCN\", \"TRICYCLICSCN\"   Group B Strep Culture No results found for: \"GBSANTIGEN\"           External Prenatal Results       Pregnancy Outside Results - Transcribed From Office Records - See Scanned Records For Details       Test Value Date Time "    ABO  A  23 1059    Rh  Positive  23 1059    Antibody Screen  Negative  23 1059    Varicella IgG       Rubella  4.69 index 23 1059    Hgb  11.0 g/dL 23 0909       10.5 g/dL 23 0903       10.7 g/dL 10/05/23 0950       13.3 g/dL 23 1059    Hct  31.5 % 23 0909       31.0 % 10/05/23 0950       39.3 % 23 1059    Glucose Fasting GTT       Glucose Tolerance Test 1 hour       Glucose Tolerance Test 3 hour       Gonorrhea (discrete)  Negative  23 1059    Chlamydia (discrete)  Negative  23 1059    RPR  Non-Reactive  23 1059    VDRL       Syphilis Antibody       HBsAg  Non-Reactive  23 1059    Herpes Simplex Virus PCR       Herpes Simplex VIrus Culture       HIV  Non-Reactive  23 1059    Hep C RNA Quant PCR       Hep C Antibody  Non-Reactive  23 1059    AFP       Group B Strep ^ negative  23     GBS Susceptibility to Clindamycin       GBS Susceptibility to Erythromycin       Fetal Fibronectin       Genetic Testing, Maternal Blood                 Drug Screening       Test Value Date Time    Urine Drug Screen       Amphetamine Screen  Negative  23 1059    Barbiturate Screen  Negative  23 1059    Benzodiazepine Screen  Negative  23 1059    Methadone Screen  Negative  23 1059    Phencyclidine Screen  Negative  23 1059    Opiates Screen  Negative  23 1059    THC Screen  Negative  23 1059    Cocaine Screen       Propoxyphene Screen  Negative  23 1059    Buprenorphine Screen  Negative  23 1059    Methamphetamine Screen       Oxycodone Screen  Negative  23 1059    Tricyclic Antidepressants Screen  Negative  23 1059              Legend    ^: Historical                              Past OB History:       OB History    Para Term  AB Living   3 1 1 0 1 1   SAB IAB Ectopic Molar Multiple Live Births   1 0 0 0 0 1      # Outcome Date GA Lbr Akira/2nd Weight Sex  Delivery Anes PTL Lv   3 Current            2 SAB 06/2022           1 Term 11/02/20 37w0d 04:59 / 02:17 2935 g (6 lb 7.5 oz) M Vag-Vacuum EPI N ÁLVARO      Name: Alfredo      Apgar1: 7  Apgar5: 9      Obstetric Comments   2020 - Alfredo       Past Medical History: Past Medical History:   Diagnosis Date    Gestational hypertension     Gestational hypertension, third trimester 10/29/2020    H/O melanoma in situ - on back 06/2018      Past Surgical History Past Surgical History:   Procedure Laterality Date    CHOLECYSTECTOMY WITH INTRAOPERATIVE CHOLANGIOGRAM N/A 11/25/2020    Procedure: CHOLECYSTECTOMY LAPAROSCOPIC INTRAOPERATIVE CHOLANGIOGRAM;  Surgeon: Dennis Zhou MD;  Location: [facility];  Service: General;  Laterality: N/A;    D & C WITH SUCTION  06/15/2022    SKIN CANCER EXCISION  06/2018    MIS on back    WISDOM TOOTH EXTRACTION  2012      Family History: Family History   Problem Relation Age of Onset    Diabetes Father       Social History:  reports that she has never smoked. She has never used smokeless tobacco.   reports that she does not currently use alcohol.   reports no history of drug use.                   General ROS Negative Findings:Headaches, Visual Changes, Epigastric pain, Anorexia, Nausia/Vomiting, ROM, and Vaginal Bleeding    ROS     All other systems have been reviewed and are neg  Objective       Vital Signs Range for the last 24 hours  Temperature: Temp:  [97.6 °F (36.4 °C)] 97.6 °F (36.4 °C)   Temp Source: Temp src: Oral   BP: BP: (135)/(89) 135/89   Pulse: Heart Rate:  [106] 106   Respirations:     SPO2:     O2 Amount (l/min):     O2 Devices Device (Oxygen Therapy): room air   Weight: Weight:  [105 kg (231 lb)] 105 kg (231 lb)     Physical Examination:   General:   alert, appears stated age, and cooperative   Skin:   normal   HEENT: Sclera clear   Lungs:      Heart:      Gastrointestinal: Abdomen soft, gravid uterus, guarding benign exam   Lower Extremities: Trace edema, no calf tenderness    :    Musculoskeletal:     Neuro: No focal deficit noted.         Presentation: vtx   Cervix: Exam by: Method: sterile exam per RN   Dilation:  4cm   Effacement: Cervical Effacement: 70-80%   Station:                                              Unchanged from prior exam    Fetal Heart Rate Assessment   Method: Fetal HR Assessment Method: external   Beats/min: Fetal HR (beats/min): 145   Baseline: Fetal HR Baseline: normal range   Varibility: Fetal HR Variability: moderate (amplitude range 6 to 25 bpm)   Accels: Fetal HR Accelerations: greater than/equal to 15 bpm, lasting at least 15 seconds   Decels: Fetal HR Decelerations: absent   Tracing Category:     ST-indications contractions, interpretation reactive, moderate variability, accelerations present 15 x 15, no fetal deceleration noted, onset 1040, end time 1152, irregular contractions noted.  Uterine Assessment   Method: Method: external tocotransducer   Frequency (min): Contraction Frequency (Minutes): x1   Ctx Count in 10 min:     Duration:     Intensity: Contraction Intensity: mild by palpation   Intensity by IUPC:     Resting Tone: Uterine Resting Tone: soft by palpation   Resting Tone by IUPC:     Redig Units:       Laboratory Results:   Lab Results (last 24 hours)       ** No results found for the last 24 hours. **          Radiology Review:   Imaging Results (Last 24 Hours)       ** No results found for the last 24 hours. **          Other Studies:    Assessment & Plan       Third trimester pregnancy        Assessment:  1.  Intrauterine pregnancy at 38w3d weeks gestation with reactive fetal status.    2.  False labor  3.  GBS negative  4.      Plan:  1.  Discharge to home, kick count, labor instruction given, follow-up with OB provider routine or as needed  2. Plan of care has been reviewed with patient.  3.  Risks, benefits of treatment plan have been discussed.  4.  All questions have been answered.  5      Eduardo Larry,  DO  12/15/2023  11:52 EST

## 2023-12-16 ENCOUNTER — ANESTHESIA (OUTPATIENT)
Dept: LABOR AND DELIVERY | Facility: HOSPITAL | Age: 33
End: 2023-12-16
Payer: COMMERCIAL

## 2023-12-16 ENCOUNTER — ANESTHESIA EVENT (OUTPATIENT)
Dept: LABOR AND DELIVERY | Facility: HOSPITAL | Age: 33
End: 2023-12-16
Payer: COMMERCIAL

## 2023-12-16 ENCOUNTER — HOSPITAL ENCOUNTER (INPATIENT)
Facility: HOSPITAL | Age: 33
LOS: 2 days | Discharge: HOME | End: 2023-12-18
Attending: OBSTETRICS & GYNECOLOGY | Admitting: OBSTETRICS & GYNECOLOGY
Payer: MEDICAID

## 2023-12-16 PROBLEM — Z37.9 NORMAL LABOR: Status: ACTIVE | Noted: 2023-12-16

## 2023-12-16 PROBLEM — Z34.93 THIRD TRIMESTER PREGNANCY: Status: RESOLVED | Noted: 2023-12-15 | Resolved: 2023-12-16

## 2023-12-16 PROBLEM — L29.9 ITCHING: Status: RESOLVED | Noted: 2023-11-30 | Resolved: 2023-12-16

## 2023-12-16 LAB
ABO GROUP BLD: NORMAL
ALP SERPL-CCNC: 123 U/L (ref 39–117)
ALT SERPL W P-5'-P-CCNC: 8 U/L (ref 1–33)
AST SERPL-CCNC: 12 U/L (ref 1–32)
BILIRUB SERPL-MCNC: 0.3 MG/DL (ref 0–1.2)
BLD GP AB SCN SERPL QL: NEGATIVE
CREAT SERPL-MCNC: 0.51 MG/DL (ref 0.57–1)
DEPRECATED RDW RBC AUTO: 44 FL (ref 37–54)
ERYTHROCYTE [DISTWIDTH] IN BLOOD BY AUTOMATED COUNT: 13.4 % (ref 12.3–15.4)
HCT VFR BLD AUTO: 32.6 % (ref 34–46.6)
HGB BLD-MCNC: 11 G/DL (ref 12–15.9)
LDH SERPL-CCNC: 166 U/L (ref 135–214)
MCH RBC QN AUTO: 30.6 PG (ref 26.6–33)
MCHC RBC AUTO-ENTMCNC: 33.7 G/DL (ref 31.5–35.7)
MCV RBC AUTO: 90.8 FL (ref 79–97)
PLATELET # BLD AUTO: 138 10*3/MM3 (ref 140–450)
PMV BLD AUTO: 12.4 FL (ref 6–12)
RBC # BLD AUTO: 3.59 10*6/MM3 (ref 3.77–5.28)
RH BLD: POSITIVE
T&S EXPIRATION DATE: NORMAL
URATE SERPL-MCNC: 3.9 MG/DL (ref 2.4–5.7)
WBC NRBC COR # BLD AUTO: 9.06 10*3/MM3 (ref 3.4–10.8)

## 2023-12-16 PROCEDURE — 86901 BLOOD TYPING SEROLOGIC RH(D): CPT | Performed by: OBSTETRICS & GYNECOLOGY

## 2023-12-16 PROCEDURE — 25010000002 ONDANSETRON PER 1 MG: Performed by: ANESTHESIOLOGY

## 2023-12-16 PROCEDURE — 85027 COMPLETE CBC AUTOMATED: CPT | Performed by: OBSTETRICS & GYNECOLOGY

## 2023-12-16 PROCEDURE — 25010000002 FENTANYL CITRATE (PF) 50 MCG/ML SOLUTION: Performed by: ANESTHESIOLOGY

## 2023-12-16 PROCEDURE — 25010000002 ROPIVACAINE PER 1 MG: Performed by: ANESTHESIOLOGY

## 2023-12-16 PROCEDURE — 25010000002 BUPIVACAINE (PF) 0.5 % SOLUTION: Performed by: ANESTHESIOLOGY

## 2023-12-16 PROCEDURE — 86900 BLOOD TYPING SEROLOGIC ABO: CPT | Performed by: OBSTETRICS & GYNECOLOGY

## 2023-12-16 PROCEDURE — 59410 OBSTETRICAL CARE: CPT | Performed by: OBSTETRICS & GYNECOLOGY

## 2023-12-16 PROCEDURE — 84550 ASSAY OF BLOOD/URIC ACID: CPT | Performed by: OBSTETRICS & GYNECOLOGY

## 2023-12-16 PROCEDURE — 36415 COLL VENOUS BLD VENIPUNCTURE: CPT | Performed by: OBSTETRICS & GYNECOLOGY

## 2023-12-16 PROCEDURE — 83615 LACTATE (LD) (LDH) ENZYME: CPT | Performed by: OBSTETRICS & GYNECOLOGY

## 2023-12-16 PROCEDURE — 51703 INSERT BLADDER CATH COMPLEX: CPT

## 2023-12-16 PROCEDURE — 84460 ALANINE AMINO (ALT) (SGPT): CPT | Performed by: OBSTETRICS & GYNECOLOGY

## 2023-12-16 PROCEDURE — C1755 CATHETER, INTRASPINAL: HCPCS

## 2023-12-16 PROCEDURE — 25810000003 LACTATED RINGERS PER 1000 ML: Performed by: OBSTETRICS & GYNECOLOGY

## 2023-12-16 PROCEDURE — 82247 BILIRUBIN TOTAL: CPT | Performed by: OBSTETRICS & GYNECOLOGY

## 2023-12-16 PROCEDURE — 0HQ9XZZ REPAIR PERINEUM SKIN, EXTERNAL APPROACH: ICD-10-PCS | Performed by: OBSTETRICS & GYNECOLOGY

## 2023-12-16 PROCEDURE — 25810000003 LACTATED RINGERS SOLUTION: Performed by: ANESTHESIOLOGY

## 2023-12-16 PROCEDURE — S0260 H&P FOR SURGERY: HCPCS | Performed by: OBSTETRICS & GYNECOLOGY

## 2023-12-16 PROCEDURE — 86850 RBC ANTIBODY SCREEN: CPT | Performed by: OBSTETRICS & GYNECOLOGY

## 2023-12-16 PROCEDURE — C1755 CATHETER, INTRASPINAL: HCPCS | Performed by: ANESTHESIOLOGY

## 2023-12-16 PROCEDURE — 84075 ASSAY ALKALINE PHOSPHATASE: CPT | Performed by: OBSTETRICS & GYNECOLOGY

## 2023-12-16 PROCEDURE — 82565 ASSAY OF CREATININE: CPT | Performed by: OBSTETRICS & GYNECOLOGY

## 2023-12-16 PROCEDURE — 84450 TRANSFERASE (AST) (SGOT): CPT | Performed by: OBSTETRICS & GYNECOLOGY

## 2023-12-16 RX ORDER — HYDROCODONE BITARTRATE AND ACETAMINOPHEN 5; 325 MG/1; MG/1
1 TABLET ORAL EVERY 8 HOURS PRN
Status: DISCONTINUED | OUTPATIENT
Start: 2023-12-16 | End: 2023-12-18 | Stop reason: HOSPADM

## 2023-12-16 RX ORDER — SODIUM CHLORIDE 0.9 % (FLUSH) 0.9 %
10 SYRINGE (ML) INJECTION AS NEEDED
Status: DISCONTINUED | OUTPATIENT
Start: 2023-12-16 | End: 2023-12-18 | Stop reason: HOSPADM

## 2023-12-16 RX ORDER — OXYTOCIN/0.9 % SODIUM CHLORIDE 30/500 ML
250 PLASTIC BAG, INJECTION (ML) INTRAVENOUS ONCE
Status: CANCELLED | OUTPATIENT
Start: 2023-12-16 | End: 2023-12-16

## 2023-12-16 RX ORDER — HYDROCORTISONE 25 MG/G
1 CREAM TOPICAL AS NEEDED
Status: DISCONTINUED | OUTPATIENT
Start: 2023-12-16 | End: 2023-12-18 | Stop reason: HOSPADM

## 2023-12-16 RX ORDER — OXYTOCIN/0.9 % SODIUM CHLORIDE 30/500 ML
2 PLASTIC BAG, INJECTION (ML) INTRAVENOUS ONCE
Status: CANCELLED | OUTPATIENT
Start: 2023-12-16 | End: 2023-12-16

## 2023-12-16 RX ORDER — IBUPROFEN 600 MG/1
600 TABLET ORAL EVERY 6 HOURS SCHEDULED
Status: DISCONTINUED | OUTPATIENT
Start: 2023-12-16 | End: 2023-12-18 | Stop reason: HOSPADM

## 2023-12-16 RX ORDER — SODIUM CHLORIDE, SODIUM LACTATE, POTASSIUM CHLORIDE, CALCIUM CHLORIDE 600; 310; 30; 20 MG/100ML; MG/100ML; MG/100ML; MG/100ML
125 INJECTION, SOLUTION INTRAVENOUS CONTINUOUS
Status: DISCONTINUED | OUTPATIENT
Start: 2023-12-16 | End: 2023-12-18 | Stop reason: HOSPADM

## 2023-12-16 RX ORDER — LIDOCAINE HYDROCHLORIDE AND EPINEPHRINE 15; 5 MG/ML; UG/ML
INJECTION, SOLUTION EPIDURAL AS NEEDED
Status: DISCONTINUED | OUTPATIENT
Start: 2023-12-16 | End: 2023-12-16 | Stop reason: SURG

## 2023-12-16 RX ORDER — ROPIVACAINE HYDROCHLORIDE 2 MG/ML
15 INJECTION, SOLUTION EPIDURAL; INFILTRATION; PERINEURAL CONTINUOUS
Status: DISCONTINUED | OUTPATIENT
Start: 2023-12-16 | End: 2023-12-18 | Stop reason: HOSPADM

## 2023-12-16 RX ORDER — ACETAMINOPHEN 325 MG/1
650 TABLET ORAL EVERY 6 HOURS PRN
Status: DISCONTINUED | OUTPATIENT
Start: 2023-12-16 | End: 2023-12-18 | Stop reason: HOSPADM

## 2023-12-16 RX ORDER — EPHEDRINE SULFATE 5 MG/ML
10 INJECTION INTRAVENOUS
Status: DISCONTINUED | OUTPATIENT
Start: 2023-12-16 | End: 2023-12-16 | Stop reason: HOSPADM

## 2023-12-16 RX ORDER — ONDANSETRON 2 MG/ML
4 INJECTION INTRAMUSCULAR; INTRAVENOUS ONCE AS NEEDED
Status: COMPLETED | OUTPATIENT
Start: 2023-12-16 | End: 2023-12-16

## 2023-12-16 RX ORDER — FENTANYL CITRATE 50 UG/ML
INJECTION, SOLUTION INTRAMUSCULAR; INTRAVENOUS AS NEEDED
Status: DISCONTINUED | OUTPATIENT
Start: 2023-12-16 | End: 2023-12-16 | Stop reason: SURG

## 2023-12-16 RX ORDER — CITRIC ACID/SODIUM CITRATE 334-500MG
30 SOLUTION, ORAL ORAL ONCE
Status: DISCONTINUED | OUTPATIENT
Start: 2023-12-16 | End: 2023-12-16 | Stop reason: HOSPADM

## 2023-12-16 RX ORDER — FAMOTIDINE 10 MG/ML
20 INJECTION, SOLUTION INTRAVENOUS ONCE AS NEEDED
Status: DISCONTINUED | OUTPATIENT
Start: 2023-12-16 | End: 2023-12-16 | Stop reason: HOSPADM

## 2023-12-16 RX ORDER — SODIUM CHLORIDE 0.9 % (FLUSH) 0.9 %
1-10 SYRINGE (ML) INJECTION AS NEEDED
Status: DISCONTINUED | OUTPATIENT
Start: 2023-12-16 | End: 2023-12-18 | Stop reason: HOSPADM

## 2023-12-16 RX ORDER — BUPIVACAINE HYDROCHLORIDE 5 MG/ML
INJECTION, SOLUTION EPIDURAL; INTRACAUDAL AS NEEDED
Status: DISCONTINUED | OUTPATIENT
Start: 2023-12-16 | End: 2023-12-16 | Stop reason: SURG

## 2023-12-16 RX ORDER — DOCUSATE SODIUM 100 MG/1
100 CAPSULE, LIQUID FILLED ORAL 2 TIMES DAILY
Status: DISCONTINUED | OUTPATIENT
Start: 2023-12-16 | End: 2023-12-18 | Stop reason: HOSPADM

## 2023-12-16 RX ORDER — SODIUM CHLORIDE 0.9 % (FLUSH) 0.9 %
10 SYRINGE (ML) INJECTION EVERY 12 HOURS SCHEDULED
Status: DISCONTINUED | OUTPATIENT
Start: 2023-12-16 | End: 2023-12-17

## 2023-12-16 RX ORDER — METOCLOPRAMIDE HYDROCHLORIDE 5 MG/ML
10 INJECTION INTRAMUSCULAR; INTRAVENOUS ONCE AS NEEDED
Status: DISCONTINUED | OUTPATIENT
Start: 2023-12-16 | End: 2023-12-16 | Stop reason: HOSPADM

## 2023-12-16 RX ORDER — OXYTOCIN/0.9 % SODIUM CHLORIDE 30/500 ML
PLASTIC BAG, INJECTION (ML) INTRAVENOUS
Status: COMPLETED
Start: 2023-12-16 | End: 2023-12-16

## 2023-12-16 RX ORDER — BISACODYL 10 MG
10 SUPPOSITORY, RECTAL RECTAL DAILY PRN
Status: DISCONTINUED | OUTPATIENT
Start: 2023-12-17 | End: 2023-12-18 | Stop reason: HOSPADM

## 2023-12-16 RX ORDER — DIPHENHYDRAMINE HYDROCHLORIDE 50 MG/ML
12.5 INJECTION INTRAMUSCULAR; INTRAVENOUS EVERY 8 HOURS PRN
Status: DISCONTINUED | OUTPATIENT
Start: 2023-12-16 | End: 2023-12-16 | Stop reason: HOSPADM

## 2023-12-16 RX ORDER — OXYTOCIN/0.9 % SODIUM CHLORIDE 30/500 ML
125 PLASTIC BAG, INJECTION (ML) INTRAVENOUS CONTINUOUS PRN
Status: DISCONTINUED | OUTPATIENT
Start: 2023-12-16 | End: 2023-12-18 | Stop reason: HOSPADM

## 2023-12-16 RX ORDER — HYDROCODONE BITARTRATE AND ACETAMINOPHEN 5; 325 MG/1; MG/1
2 TABLET ORAL EVERY 6 HOURS PRN
Status: DISCONTINUED | OUTPATIENT
Start: 2023-12-16 | End: 2023-12-18 | Stop reason: HOSPADM

## 2023-12-16 RX ADMIN — SODIUM CHLORIDE, POTASSIUM CHLORIDE, SODIUM LACTATE AND CALCIUM CHLORIDE 125 ML/HR: 600; 310; 30; 20 INJECTION, SOLUTION INTRAVENOUS at 11:23

## 2023-12-16 RX ADMIN — IBUPROFEN 600 MG: 600 TABLET, FILM COATED ORAL at 17:32

## 2023-12-16 RX ADMIN — BUPIVACAINE HYDROCHLORIDE 6 ML: 5 INJECTION, SOLUTION EPIDURAL; INTRACAUDAL at 10:29

## 2023-12-16 RX ADMIN — Medication: at 17:32

## 2023-12-16 RX ADMIN — WITCH HAZEL: 500 SOLUTION RECTAL; TOPICAL at 17:32

## 2023-12-16 RX ADMIN — SODIUM CHLORIDE, POTASSIUM CHLORIDE, SODIUM LACTATE AND CALCIUM CHLORIDE 125 ML/HR: 600; 310; 30; 20 INJECTION, SOLUTION INTRAVENOUS at 10:18

## 2023-12-16 RX ADMIN — DOCUSATE SODIUM 100 MG: 100 CAPSULE, LIQUID FILLED ORAL at 20:58

## 2023-12-16 RX ADMIN — ACETAMINOPHEN 650 MG: 325 TABLET ORAL at 21:00

## 2023-12-16 RX ADMIN — Medication 1 APPLICATION: at 17:32

## 2023-12-16 RX ADMIN — ROPIVACAINE HYDROCHLORIDE 15 ML/HR: 2 INJECTION, SOLUTION EPIDURAL; INFILTRATION at 10:29

## 2023-12-16 RX ADMIN — SODIUM CHLORIDE, POTASSIUM CHLORIDE, SODIUM LACTATE AND CALCIUM CHLORIDE 1000 ML: 600; 310; 30; 20 INJECTION, SOLUTION INTRAVENOUS at 09:58

## 2023-12-16 RX ADMIN — Medication 30 UNITS: at 14:31

## 2023-12-16 RX ADMIN — SODIUM CHLORIDE, POTASSIUM CHLORIDE, SODIUM LACTATE AND CALCIUM CHLORIDE 125 ML/HR: 600; 310; 30; 20 INJECTION, SOLUTION INTRAVENOUS at 08:10

## 2023-12-16 RX ADMIN — ONDANSETRON 4 MG: 2 INJECTION INTRAMUSCULAR; INTRAVENOUS at 11:02

## 2023-12-16 RX ADMIN — EPHEDRINE SULFATE 10 MG: 5 INJECTION INTRAVENOUS at 11:00

## 2023-12-16 RX ADMIN — LIDOCAINE HYDROCHLORIDE AND EPINEPHRINE 3 ML: 15; 5 INJECTION, SOLUTION EPIDURAL at 10:29

## 2023-12-16 RX ADMIN — FENTANYL CITRATE 100 MCG: 50 INJECTION, SOLUTION INTRAMUSCULAR; INTRAVENOUS at 10:29

## 2023-12-16 NOTE — ANESTHESIA PREPROCEDURE EVALUATION
Anesthesia Evaluation     Patient summary reviewed and Nursing notes reviewed                Airway   Mallampati: I  TM distance: >3 FB  Neck ROM: full  No difficulty expected  Dental - normal exam     Pulmonary - negative pulmonary ROS and normal exam   Cardiovascular - negative cardio ROS and normal exam        Neuro/Psych- negative ROS  GI/Hepatic/Renal/Endo - negative ROS     Musculoskeletal (-) negative ROS    Abdominal  - normal exam    Bowel sounds: normal.   Substance History - negative use     OB/GYN negative ob/gyn ROS         Other                    Anesthesia Plan    ASA 2 - emergent     epidural       Anesthetic plan, risks, benefits, and alternatives have been provided, discussed and informed consent has been obtained with: patient.    Use of blood products discussed with patient .    Plan discussed with attending.    CODE STATUS:

## 2023-12-16 NOTE — ANESTHESIA PROCEDURE NOTES
Labor Epidural      Patient reassessed immediately prior to procedure    Patient location during procedure: OB  Start Time: 12/16/2023 10:19 AM  Stop Time: 12/16/2023 10:35 AM  Performed By  Anesthesiologist: Tony Orozco MD  Preanesthetic Checklist  Completed: patient identified, IV checked, site marked, risks and benefits discussed, surgical consent, monitors and equipment checked, pre-op evaluation and timeout performed  Additional Notes  DPE  Prep:  Pt Position:sitting  Sterile Tech:cap, gloves, mask and sterile barrier  Prep:DuraPrep  Monitoring:blood pressure monitoring  Epidural Block Procedure:  Approach:midline  Guidance:landmark technique  Location:L4-L5  Needle Type:Tuohy  Needle Gauge:17 G  Loss of Resistance Medium: air  Loss of Resistance: 5cm  Cath Depth at skin:15 cm  Paresthesia: none  Aspiration:negative  Test Dose:negative  Number of Attempts: 2  Post Assessment:  Dressing:occlusive dressing applied and secured with tape  Pt Tolerance:patient tolerated the procedure well with no apparent complications  Complications:no

## 2023-12-16 NOTE — H&P
Yvette  Obstetric History and Physical    Chief Complaint   Patient presents with    Contractions       Subjective     Patient is a 33 y.o. female  currently at 38w4d, who presents in active labor.    Her prenatal care is benign.  Her previous obstetric/gynecological history is noted for is non-contributory.    The following portions of the patients history were reviewed and updated as appropriate: current medications, allergies, past medical history, past surgical history, past family history, past social history, and problem list .       Prenatal Information:  Prenatal Results       Initial Prenatal Labs       Test Value Reference Range Date Time    Hemoglobin  13.3 g/dL 12.0 - 15.9 23 1059    Hematocrit  39.3 % 34.0 - 46.6 23 1059    Platelets  233 10*3/mm3 140 - 450 23 1059    Rubella IgG  4.69 index Immune >0.99 23 1059    Hepatitis B SAg  Non-Reactive  Non-Reactive 23 1059    Hepatitis C Ab  Non-Reactive  Non-Reactive 23 1059    RPR  Non-Reactive  Non-Reactive 23 1059    T. Pallidum Ab         ABO  A   23 0812    Rh  Positive   23 0812    Antibody Screen  Negative   23 1059    HIV  Non-Reactive  Non-Reactive 23 1059    Urine Culture  No growth   23 1059    Gonorrhea  Negative  Negative 23 1059    Chlamydia  Negative  Negative 23 1059    TSH  1.100 uIU/mL 0.270 - 4.200 22 0853    HgB A1c         Varicella IgG        HgB Electrophoresis         Cystic fibrosis                   Fetal testing        Test Value Reference Range Date Time    NIPT ^ declined   23     MSAFP ^ declined   23     AFP-4 ^ declined   23               2nd and 3rd Trimester       Test Value Reference Range Date Time    Hemoglobin (repeated)  11.0 g/dL 12.0 - 15.9 23 0812       11.0 g/dL 12.0 - 15.9 23 0909       10.5 g/dL 12.0 - 15.9 23 0903       10.7 g/dL 12.0 - 15.9 10/05/23 0950    Hematocrit  (repeated)  32.6 % 34.0 - 46.6 12/16/23 0812       31.5 % 34.0 - 46.6 11/30/23 0909       31.0 % 34.0 - 46.6 10/05/23 0950    Platelets   138 10*3/mm3 140 - 450 12/16/23 0812       166 10*3/mm3 140 - 450 11/30/23 0909       233 10*3/mm3 140 - 450 06/07/23 1059    GCT  124 mg/dL 65 - 139 10/05/23 0950    Antibody Screen (repeated)  Negative   12/16/23 0812       Negative   06/07/23 1059    Third Trimester syphilis screen (repeated)   Non-Reactive  Non-Reactive 06/07/23 1059    GTT Fasting        GTT 1 Hr        GTT 2 Hr        GTT 3 Hr        Group B Strep ^ negative   11/30/23               Other testing        Test Value Reference Range Date Time    Parvo IgG         CMV IgG                   Drug Screening       Test Value Reference Range Date Time    Amphetamine Screen  Negative  Negative 06/07/23 1059    Barbiturate Screen  Negative  Negative 06/07/23 1059    Benzodiazepine Screen  Negative  Negative 06/07/23 1059    Methadone Screen  Negative  Negative 06/07/23 1059    Phencyclidine Screen  Negative  Negative 06/07/23 1059    Opiates Screen  Negative  Negative 06/07/23 1059    THC Screen  Negative  Negative 06/07/23 1059    Cocaine Screen  Negative  Negative 06/07/23 1059    Propoxyphene Screen  Negative  Negative 06/07/23 1059    Buprenorphine Screen  Negative  Negative 06/07/23 1059    Methamphetamine Screen  Negative  Negative 06/07/23 1059    Oxycodone Screen  Negative  Negative 06/07/23 1059    Tricyclic Antidepressants Screen  Negative  Negative 06/07/23 1059              Legend    ^: Historical                          External Prenatal Results       Pregnancy Outside Results - Transcribed From Office Records - See Scanned Records For Details       Test Value Date Time    ABO  A  12/16/23 0812    Rh  Positive  12/16/23 0812    Antibody Screen  Negative  12/16/23 0812       Negative  06/07/23 1059    Varicella IgG       Rubella  4.69 index 06/07/23 1059    Hgb  11.0 g/dL 12/16/23 0812       11.0 g/dL  23 0909       10.5 g/dL 23 0903       10.7 g/dL 10/05/23 0950       13.3 g/dL 23 1059    Hct  32.6 % 23 0812       31.5 % 23 0909       31.0 % 10/05/23 0950       39.3 % 23 1059    Glucose Fasting GTT       Glucose Tolerance Test 1 hour       Glucose Tolerance Test 3 hour       Gonorrhea (discrete)  Negative  23 1059    Chlamydia (discrete)  Negative  23 1059    RPR  Non-Reactive  23 1059    VDRL       Syphilis Antibody       HBsAg  Non-Reactive  23 1059    Herpes Simplex Virus PCR       Herpes Simplex VIrus Culture       HIV  Non-Reactive  23 1059    Hep C RNA Quant PCR       Hep C Antibody  Non-Reactive  23 1059    AFP       Group B Strep ^ negative  23     GBS Susceptibility to Clindamycin       GBS Susceptibility to Erythromycin       Fetal Fibronectin       Genetic Testing, Maternal Blood                 Drug Screening       Test Value Date Time    Urine Drug Screen       Amphetamine Screen  Negative  23 1059    Barbiturate Screen  Negative  23 1059    Benzodiazepine Screen  Negative  23 1059    Methadone Screen  Negative  23 1059    Phencyclidine Screen  Negative  23 1059    Opiates Screen  Negative  23 1059    THC Screen  Negative  23 1059    Cocaine Screen       Propoxyphene Screen  Negative  23 1059    Buprenorphine Screen  Negative  23 1059    Methamphetamine Screen       Oxycodone Screen  Negative  23 1059    Tricyclic Antidepressants Screen  Negative  23 1059              Legend    ^: Historical                             Past OB History:     OB History    Para Term  AB Living   3 1 1 0 1 1   SAB IAB Ectopic Molar Multiple Live Births   1 0 0 0 0 1      # Outcome Date GA Lbr Akira/2nd Weight Sex Delivery Anes PTL Lv   3 Current            2 SAB 2022           1 Term 20 37w0d 04:59 / 02:17 2935 g (6 lb 7.5 oz) M Vag-Vacuum EPI N ÁLVARO       Name: Alfredo      Apgar1: 7  Apgar5: 9      Obstetric Comments   2020 - Alfredo       Past Medical History: Past Medical History:   Diagnosis Date    Gestational hypertension     Gestational hypertension, third trimester 10/29/2020    H/O melanoma in situ - on back 06/2018      Past Surgical History Past Surgical History:   Procedure Laterality Date    CHOLECYSTECTOMY WITH INTRAOPERATIVE CHOLANGIOGRAM N/A 11/25/2020    Procedure: CHOLECYSTECTOMY LAPAROSCOPIC INTRAOPERATIVE CHOLANGIOGRAM;  Surgeon: Dennis Zhou MD;  [facility];  Service: General;  Laterality: N/A;    D & C WITH SUCTION  06/15/2022    SKIN CANCER EXCISION  06/2018    MIS on back    WISDOM TOOTH EXTRACTION  2012      Family History: Family History   Problem Relation Age of Onset    Diabetes Father       Social History:  reports that she has never smoked. She has never used smokeless tobacco.   reports that she does not currently use alcohol.   reports no history of drug use.        Review of Systems:    All other systems reviewed and negative    Objective     Vital Signs Range for the last 24 hours  Temperature: Temp:  [97.8 °F (36.6 °C)-98.5 °F (36.9 °C)] 97.8 °F (36.6 °C)   Temp Source: Temp src: Oral   BP: BP: (110-137)/(56-82) 122/73   Pulse: Heart Rate:  [] 82   Respirations: Resp:  [17-18] 17   SPO2:     O2 Amount (l/min):     O2 Devices     Weight:       Physical Examination: General appearance - alert, well appearing, and in no distress  Neck - supple, no significant adenopathy  Abdomen - soft, nontender, nondistended, no masses or organomegaly  Musculoskeletal - no joint tenderness, deformity or swelling  Extremities - peripheral pulses normal, no pedal edema, no clubbing or cyanosis  Skin - normal coloration and turgor, no rashes, no suspicious skin lesions noted    Presentation: vtx   Cervix: Exam by:     Dilation:     Effacement:     Station:       Fetal Heart Rate Assessment   Method:     Beats/min:     Baseline:      Varibility:     Accels:     Decels:     Tracing Category:  1     Uterine Assessment   Method:     Frequency (min):     Ctx Count in 10 min:     Duration:     Intensity:     Intensity by IUPC:     Resting Tone:     Resting Tone by IUPC:     Gilbert Units:       Laboratory Results: GBS neg    Assessment & Plan       Normal labor    Prenatal care, subsequent pregnancy in third trimester    Maternal anemia in pregnancy, antepartum    Excess weight gain in pregnancy, third trimester      Assessment & Plan    Assessment:  1.  Intrauterine pregnancy at 38w4d weeks gestation with reassuring fetal status.    2.  labor  with ROM    Plan:  1. fetal and uterine monitoring  continuously, expectant management, and analgesia with  epidural  2. Plan of care has been reviewed with patient and   3.  Risks, benefits of treatment plan have been discussed.  4.  All questions have been answered.      Annetta Barrios MD  12/16/2023  15:04 EST

## 2023-12-16 NOTE — L&D DELIVERY NOTE
2023    Patient:Izzy Hernandez    MR#:[id number]    Vaginal Delivery Note  33 y.o. yo female  at 38w4d      Normal labor    Prenatal care, subsequent pregnancy in third trimester    Maternal anemia in pregnancy, antepartum    Excess weight gain in pregnancy, third trimester       Delivery     Delivery:      YOB: 2022    Time of Birth: 2:26 PM      Anesthesia:      Delivering clinician:     Forceps?   No   Vacuum? No    Shoulder dystocia present: No          Infant    Findings: female  infant     Infant observations: Weight: 3280 g (7 lb 3.7 oz)     Observations/Comments:        Apgars: 8  @ 1 minute /    9  @ 5 minutes         Placenta, Cord, and Fluid    Placenta delivered  Spontaneous  at  2023  2:35 PM     Cord:   present.   Nuchal Cord?  yes; Number of nuchal loops present:      Cord blood obtained:     Cord gases obtained:      Cord gas results: Pending         Repair    Episiotomy: No   Lacerations: Yes  Laceration Information  Laceration Repaired?   Perineal:       Periurethral:       Labial:       Sulcus:       Vaginal:       Cervical:         Suture used for repair: 3-0 Vicryl     Estimated Blood Loss:  100 mls.         Complications  none    Disposition  Mother to Mother Baby/Postpartum  in stable condition currently.  Baby to remains with mom  in stable condition currently.    Description of Delivery  Patient pushed well x ~10-15 min and delivered easily from OA presentation.  There was a nuchal x 1, reduced at perineum.  Shoulders delivered easily. Nose and mouth was bulb suctioned and baby was placed on mother's abdomen.  Cord was milked,  clamped and cut.  1st degree Repair was done next.  Placenta delivered easily and with pitocin bolus, there was no uterine atony.       Annetta Barrios MD  23  15:02 EST

## 2023-12-17 PROBLEM — O99.019 MATERNAL ANEMIA IN PREGNANCY, ANTEPARTUM: Status: RESOLVED | Noted: 2023-10-05 | Resolved: 2023-12-17

## 2023-12-17 PROBLEM — Z37.9 NORMAL LABOR: Status: RESOLVED | Noted: 2023-12-16 | Resolved: 2023-12-17

## 2023-12-17 PROBLEM — O26.03 EXCESS WEIGHT GAIN IN PREGNANCY, THIRD TRIMESTER: Status: RESOLVED | Noted: 2023-10-19 | Resolved: 2023-12-17

## 2023-12-17 PROBLEM — Z34.83 PRENATAL CARE, SUBSEQUENT PREGNANCY IN THIRD TRIMESTER: Status: RESOLVED | Noted: 2023-06-03 | Resolved: 2023-12-17

## 2023-12-17 LAB
BASOPHILS # BLD AUTO: 0.01 10*3/MM3 (ref 0–0.2)
BASOPHILS NFR BLD AUTO: 0.1 % (ref 0–1.5)
DEPRECATED RDW RBC AUTO: 46.4 FL (ref 37–54)
EOSINOPHIL # BLD AUTO: 0.11 10*3/MM3 (ref 0–0.4)
EOSINOPHIL NFR BLD AUTO: 1.2 % (ref 0.3–6.2)
ERYTHROCYTE [DISTWIDTH] IN BLOOD BY AUTOMATED COUNT: 13.5 % (ref 12.3–15.4)
HCT VFR BLD AUTO: 30.1 % (ref 34–46.6)
HGB BLD-MCNC: 9.8 G/DL (ref 12–15.9)
IMM GRANULOCYTES # BLD AUTO: 0.05 10*3/MM3 (ref 0–0.05)
IMM GRANULOCYTES NFR BLD AUTO: 0.5 % (ref 0–0.5)
LYMPHOCYTES # BLD AUTO: 1.9 10*3/MM3 (ref 0.7–3.1)
LYMPHOCYTES NFR BLD AUTO: 20.2 % (ref 19.6–45.3)
MCH RBC QN AUTO: 30.6 PG (ref 26.6–33)
MCHC RBC AUTO-ENTMCNC: 32.6 G/DL (ref 31.5–35.7)
MCV RBC AUTO: 94.1 FL (ref 79–97)
MONOCYTES # BLD AUTO: 0.92 10*3/MM3 (ref 0.1–0.9)
MONOCYTES NFR BLD AUTO: 9.8 % (ref 5–12)
NEUTROPHILS NFR BLD AUTO: 6.42 10*3/MM3 (ref 1.7–7)
NEUTROPHILS NFR BLD AUTO: 68.2 % (ref 42.7–76)
NRBC BLD AUTO-RTO: 0 /100 WBC (ref 0–0.2)
PLATELET # BLD AUTO: 136 10*3/MM3 (ref 140–450)
PMV BLD AUTO: 12.3 FL (ref 6–12)
RBC # BLD AUTO: 3.2 10*6/MM3 (ref 3.77–5.28)
WBC NRBC COR # BLD AUTO: 9.41 10*3/MM3 (ref 3.4–10.8)

## 2023-12-17 PROCEDURE — 0503F POSTPARTUM CARE VISIT: CPT

## 2023-12-17 PROCEDURE — 85025 COMPLETE CBC W/AUTO DIFF WBC: CPT | Performed by: OBSTETRICS & GYNECOLOGY

## 2023-12-17 RX ADMIN — IBUPROFEN 600 MG: 600 TABLET, FILM COATED ORAL at 00:59

## 2023-12-17 RX ADMIN — DOCUSATE SODIUM 100 MG: 100 CAPSULE, LIQUID FILLED ORAL at 20:25

## 2023-12-17 RX ADMIN — IBUPROFEN 600 MG: 600 TABLET, FILM COATED ORAL at 06:05

## 2023-12-17 RX ADMIN — IBUPROFEN 600 MG: 600 TABLET, FILM COATED ORAL at 18:08

## 2023-12-17 RX ADMIN — IBUPROFEN 600 MG: 600 TABLET, FILM COATED ORAL at 12:58

## 2023-12-17 RX ADMIN — DOCUSATE SODIUM 100 MG: 100 CAPSULE, LIQUID FILLED ORAL at 08:50

## 2023-12-17 RX ADMIN — IBUPROFEN 600 MG: 600 TABLET, FILM COATED ORAL at 23:40

## 2023-12-17 RX ADMIN — ACETAMINOPHEN 650 MG: 325 TABLET ORAL at 09:06

## 2023-12-17 NOTE — ANESTHESIA POSTPROCEDURE EVALUATION
Patient: Izzy Hernandez    Procedure Summary       Date: 12/16/23 Room / Location:     Anesthesia Start: 1019 Anesthesia Stop: 1426    Procedure: LABOR ANALGESIA Diagnosis:     Scheduled Providers:  Provider:     Anesthesia Type: epidural ASA Status: 2 - Emergent            Anesthesia Type: epidural    Vitals  Vitals Value Taken Time   /74 12/17/23 0825   Temp 98.1 °F (36.7 °C) 12/17/23 0825   Pulse 81 12/17/23 0825   Resp 18 12/17/23 0825   SpO2             Post Anesthesia Care and Evaluation    Patient location during evaluation: bedside  Patient participation: complete - patient participated  Level of consciousness: awake and awake and alert  Pain score: 0  Pain management: satisfactory to patient    Airway patency: patent  Anesthetic complications: No anesthetic complications  PONV Status: none  Cardiovascular status: acceptable, hemodynamically stable and stable  Respiratory status: acceptable  Hydration status: stable  Post Neuraxial Block status: Motor and sensory function returned to baseline and No signs or symptoms of PDPH

## 2023-12-17 NOTE — PROGRESS NOTES
12/17/2023  PPD #1    Subjective   Izzy feels well.  Patient describes her lochia less than menses.  Pain is well controlled       Objective   Temp: Temp:  [98 °F (36.7 °C)-98.6 °F (37 °C)] 98.1 °F (36.7 °C) Temp src: Oral   BP: BP: (106-160)/(56-81) 125/74        Pulse: Heart Rate:  [] 81  RR: Resp:  [16-19] 18    General:  No acute distress   Abdomen: Fundus firm and beneath umbilicus   Pelvis: deferred     Lab Results   Component Value Date    WBC 9.41 12/17/2023    HGB 9.8 (L) 12/17/2023    HCT 30.1 (L) 12/17/2023    MCV 94.1 12/17/2023     (L) 12/17/2023    HEPBSAG Non-Reactive 06/07/2023       Assessment  PPD# 1 after vaginal delivery. Doing well.   Asymptomatic anemia. VSS.    Plan  Routine postpartum care. Advance.       This note has been electronically signed.    Madhuri Norwood, APRN  December 17, 2023

## 2023-12-17 NOTE — LACTATION NOTE
23 0830   Maternal Information   Date of Referral 23   Person Making Referral lactation consultant   Maternal Reason for Referral breastfeeding currently   Infant Reason for Referral  infant   Maternal Assessment   Left Nipple Symptoms nontender   Right Nipple Symptoms nontender   Maternal Infant Feeding   Maternal Emotional State receptive;relaxed   Pain with Feeding no   Latch Assistance none needed;verbal guidance offered   Support Person Involvement actively supporting mother   Milk Expression/Equipment   Breast Pump Type double electric, personal  (spectra and dmtiriy)   Equipment for Home Use breast pump ordered through insurance   Breast Pumping   Breast Pumping Interventions other (see comments)  (Encouraged to pump for short or missed feedings and with supplementation)     Courtesy visit for newly postpartum couplet. MOB reports pumping and supplementing first child for 6 months. Reports this baby is latching better than her first did, denies pain or discomfort with latch. Educational handout provided and reviewed. Encouraged to call as needs arise.

## 2023-12-18 VITALS
HEART RATE: 68 BPM | TEMPERATURE: 97.6 F | DIASTOLIC BLOOD PRESSURE: 82 MMHG | SYSTOLIC BLOOD PRESSURE: 132 MMHG | RESPIRATION RATE: 18 BRPM

## 2023-12-18 PROCEDURE — 0503F POSTPARTUM CARE VISIT: CPT | Performed by: OBSTETRICS & GYNECOLOGY

## 2023-12-18 RX ORDER — DOCUSATE SODIUM 100 MG/1
100 CAPSULE, LIQUID FILLED ORAL 2 TIMES DAILY PRN
Qty: 60 CAPSULE | Refills: 1 | Status: SHIPPED | OUTPATIENT
Start: 2023-12-18

## 2023-12-18 RX ORDER — IBUPROFEN 600 MG/1
600 TABLET ORAL EVERY 6 HOURS PRN
Qty: 60 TABLET | Refills: 1 | Status: SHIPPED | OUTPATIENT
Start: 2023-12-18

## 2023-12-18 RX ADMIN — IBUPROFEN 600 MG: 600 TABLET, FILM COATED ORAL at 11:56

## 2023-12-18 RX ADMIN — DOCUSATE SODIUM 100 MG: 100 CAPSULE, LIQUID FILLED ORAL at 09:05

## 2023-12-18 RX ADMIN — IBUPROFEN 600 MG: 600 TABLET, FILM COATED ORAL at 05:58

## 2023-12-18 NOTE — DISCHARGE SUMMARY
Discharge Summary     Yvette Hernandez  : 1990  MRN: [id number]  CSN: [id number]    Date of Admission: 2023   Date of Discharge:  2023   Delivering Physician: Annetta Barrios        Admission Diagnosis: Normal labor [O80, Z37.9]   Discharge Diagnosis: Same as above plus  Pregnancy at 38w4d - delivered  Postpartum anemia        Procedures: 2023  - Vaginal, Spontaneous       Hospital Course  Patient is a 33 y.o.  who at 38w4d had a vaginal birth.  Her postpartum course was without complications.  On PPD #2 she was ready for discharge.  She had normal lochia and pain well controlled with oral medications.    Infant  female  fetus weighing 3280 g (7 lb 3.7 oz)   Apgars -  8 @ 1 minute /  9 @ 5 minutes.    Discharge labs  Lab Results   Component Value Date    WBC 9.41 2023    HGB 9.8 (L) 2023    HCT 30.1 (L) 2023     (L) 2023       Discharge Medications     Discharge Medications        New Medications        Instructions Start Date   benzocaine-menthol 20-0.5 % aerosol topical spray  Commonly known as: DERMOPLAST   Topical, 3 Times Daily PRN      ibuprofen 600 MG tablet  Commonly known as: ADVIL,MOTRIN   600 mg, Oral, Every 6 Hours PRN      witch hazel-glycerin pad  Commonly known as: TUCKS   Topical, 3 Times Daily PRN             Changes to Medications        Instructions Start Date   docusate sodium 100 MG capsule  Commonly known as: Colace  What changed: additional instructions   100 mg, Oral, 2 Times Daily PRN             Continue These Medications        Instructions Start Date   omeprazole 20 MG capsule  Commonly known as: priLOSEC   20 mg, Oral, Daily      PRENATAL VITAMIN PO   1 tablet, Oral, Daily             Stop These Medications      famotidine 20 MG tablet  Commonly known as: Pepcid     ferrous sulfate 325 (65 FE) MG tablet     hydrOXYzine 50 MG tablet  Commonly known as: ATARAX              Discharge Disposition  Home or Self Care   Condition on Discharge: good   Follow-up: 6 weeks with Dr. Behzad Frazier MD  12/18/2023

## 2023-12-18 NOTE — PROGRESS NOTES
MARGARET Yvette Hameedbrook  : 1990  MRN: [id number]  CSN: [id number]    Postpartum Day #2  Subjective   Her pain is well controlled.  Vaginal bleeding is less than a normal period.      Objective     Min/max vitals past 24 hours:   Temp  Min: 97.6 °F (36.4 °C)  Max: 98.8 °F (37.1 °C)  BP  Min: 129/81  Max: 139/82  Pulse  Min: 68  Max: 86  Resp  Min: 16  Max: 18        General: well developed; well nourished  no acute distress   Abdomen: fundus firm and non-tender   Pelvic: Not performed   Ext: Calves NT     Results from last 7 days   Lab Units 23  0421 23  0812   WBC 10*3/mm3 9.41 9.06   HEMOGLOBIN g/dL 9.8* 11.0*   HEMATOCRIT % 30.1* 32.6*   PLATELETS 10*3/mm3 136* 138*     Lab Results   Component Value Date    RH Positive 2023    HEPBSAG Non-Reactive 2023        Assessment   Postpartum Day #2 S/P vaginal delivery  Postpartum anemia     Plan   Discharge to home  Advised no tampons or intercourse for 6 weeks.  D/C questions all answered  Follow-up appointment in 6 week(s)  Reviewed oral Fe for ~ 2-3 months    Fadia Frazier MD  2023  09:04 EST

## 2023-12-18 NOTE — LACTATION NOTE
Courtesy visit with mother related to infant weight loss of -7.53%; mother reporting all is going well with nursing infant; encouraged mother to pump to best encourage milk supply; reiterated education; mentioned outpatient clinic after discharge if needed.

## 2023-12-19 ENCOUNTER — TELEPHONE (OUTPATIENT)
Dept: OBSTETRICS AND GYNECOLOGY | Facility: CLINIC | Age: 33
End: 2023-12-19
Payer: MEDICAID

## 2023-12-19 RX ORDER — NICARDIPINE HYDROCHLORIDE 20 MG/1
20 CAPSULE ORAL 3 TIMES DAILY
Qty: 90 CAPSULE | Refills: 0 | Status: SHIPPED | OUTPATIENT
Start: 2023-12-19 | End: 2024-01-18

## 2023-12-19 NOTE — TELEPHONE ENCOUNTER
This patient called with mid range BP's PP. I started her on procardia and ordered lab work for AM of 12/20. I also asked her to stop by Dr. Donaldson' office tomorrow after the lab draw (ordered stat) for a quick BP check      BBB

## 2023-12-20 ENCOUNTER — CLINICAL SUPPORT (OUTPATIENT)
Dept: OBSTETRICS AND GYNECOLOGY | Facility: CLINIC | Age: 33
End: 2023-12-20
Payer: MEDICAID

## 2023-12-20 VITALS — SYSTOLIC BLOOD PRESSURE: 118 MMHG | DIASTOLIC BLOOD PRESSURE: 80 MMHG

## 2023-12-21 ENCOUNTER — HOSPITAL ENCOUNTER (OUTPATIENT)
Facility: HOSPITAL | Age: 33
Setting detail: OBSERVATION
Discharge: HOME | End: 2023-12-22
Attending: STUDENT IN AN ORGANIZED HEALTH CARE EDUCATION/TRAINING PROGRAM | Admitting: STUDENT IN AN ORGANIZED HEALTH CARE EDUCATION/TRAINING PROGRAM
Payer: MEDICAID

## 2023-12-21 LAB
DEPRECATED RDW RBC AUTO: 44.9 FL (ref 37–54)
ERYTHROCYTE [DISTWIDTH] IN BLOOD BY AUTOMATED COUNT: 13.2 % (ref 12.3–15.4)
HCT VFR BLD AUTO: 37.7 % (ref 34–46.6)
HGB BLD-MCNC: 12.6 G/DL (ref 12–15.9)
MCH RBC QN AUTO: 30.9 PG (ref 26.6–33)
MCHC RBC AUTO-ENTMCNC: 33.4 G/DL (ref 31.5–35.7)
MCV RBC AUTO: 92.4 FL (ref 79–97)
PLATELET # BLD AUTO: 256 10*3/MM3 (ref 140–450)
PMV BLD AUTO: 11.1 FL (ref 6–12)
RBC # BLD AUTO: 4.08 10*6/MM3 (ref 3.77–5.28)
WBC NRBC COR # BLD AUTO: 7.6 10*3/MM3 (ref 3.4–10.8)

## 2023-12-21 PROCEDURE — 82247 BILIRUBIN TOTAL: CPT | Performed by: OBSTETRICS & GYNECOLOGY

## 2023-12-21 PROCEDURE — 84550 ASSAY OF BLOOD/URIC ACID: CPT | Performed by: OBSTETRICS & GYNECOLOGY

## 2023-12-21 PROCEDURE — 83615 LACTATE (LD) (LDH) ENZYME: CPT | Performed by: OBSTETRICS & GYNECOLOGY

## 2023-12-21 PROCEDURE — 82565 ASSAY OF CREATININE: CPT | Performed by: OBSTETRICS & GYNECOLOGY

## 2023-12-21 PROCEDURE — 84460 ALANINE AMINO (ALT) (SGPT): CPT | Performed by: OBSTETRICS & GYNECOLOGY

## 2023-12-21 PROCEDURE — 84075 ASSAY ALKALINE PHOSPHATASE: CPT | Performed by: OBSTETRICS & GYNECOLOGY

## 2023-12-21 PROCEDURE — 36415 COLL VENOUS BLD VENIPUNCTURE: CPT | Performed by: OBSTETRICS & GYNECOLOGY

## 2023-12-21 PROCEDURE — 84450 TRANSFERASE (AST) (SGOT): CPT | Performed by: OBSTETRICS & GYNECOLOGY

## 2023-12-21 PROCEDURE — 85027 COMPLETE CBC AUTOMATED: CPT | Performed by: OBSTETRICS & GYNECOLOGY

## 2023-12-21 RX ORDER — MORPHINE SULFATE 10 MG/ML
15 INJECTION INTRAMUSCULAR; INTRAVENOUS; SUBCUTANEOUS ONCE
Status: COMPLETED | OUTPATIENT
Start: 2023-12-22 | End: 2023-12-22

## 2023-12-21 RX ORDER — PROMETHAZINE HYDROCHLORIDE 25 MG/1
25 TABLET ORAL ONCE
Status: COMPLETED | OUTPATIENT
Start: 2023-12-22 | End: 2023-12-22

## 2023-12-22 VITALS
RESPIRATION RATE: 18 BRPM | DIASTOLIC BLOOD PRESSURE: 98 MMHG | HEART RATE: 77 BPM | SYSTOLIC BLOOD PRESSURE: 127 MMHG | OXYGEN SATURATION: 96 % | TEMPERATURE: 98.3 F

## 2023-12-22 PROBLEM — R51.9 HEADACHE IN PREGNANCY, DELIVERED WITH POSTPARTUM COMPLICATION: Status: ACTIVE | Noted: 2023-12-22

## 2023-12-22 PROBLEM — O26.899 HEADACHE IN PREGNANCY, DELIVERED WITH POSTPARTUM COMPLICATION: Status: ACTIVE | Noted: 2023-12-22

## 2023-12-22 LAB
ALP SERPL-CCNC: 97 U/L (ref 39–117)
ALT SERPL W P-5'-P-CCNC: 13 U/L (ref 1–33)
AST SERPL-CCNC: 16 U/L (ref 1–32)
BILIRUB SERPL-MCNC: 0.2 MG/DL (ref 0–1.2)
CREAT SERPL-MCNC: 0.57 MG/DL (ref 0.57–1)
LDH SERPL-CCNC: 187 U/L (ref 135–214)
URATE SERPL-MCNC: 4.3 MG/DL (ref 2.4–5.7)

## 2023-12-22 PROCEDURE — 96372 THER/PROPH/DIAG INJ SC/IM: CPT

## 2023-12-22 PROCEDURE — 25010000002 MORPHINE PER 10 MG: Performed by: OBSTETRICS & GYNECOLOGY

## 2023-12-22 PROCEDURE — G0463 HOSPITAL OUTPT CLINIC VISIT: HCPCS

## 2023-12-22 PROCEDURE — 63710000001 PROMETHAZINE PER 25 MG: Performed by: OBSTETRICS & GYNECOLOGY

## 2023-12-22 RX ADMIN — MORPHINE SULFATE 15 MG: 10 INJECTION INTRAVENOUS at 00:10

## 2023-12-22 RX ADMIN — PROMETHAZINE HYDROCHLORIDE 25 MG: 25 TABLET ORAL at 00:10

## 2023-12-22 NOTE — H&P
Izzy Hameedbrook  1990  [id number]  [id number]    CC: elevated BP, HA  HPI:  Patient is 33 y.o. female   s/p   presents with c/o headache, onset last night, frontal, rates 8/10, no assoc visual changes. ?mild abd pain.  Pt also had elevated BP at home (149/105).  Pt is breast feeding and bleeding is scant.  PT was started on procardia 20mg tid a few days ago for elevated BP at home.    PMH:  Current meds: PNV, procardia 20 mg tid, motrine 600mg, Tyl  Illnesses: none  Surgeries: lap joe, melanoma removal, D and C, oral surg  Allergies: NKDA    Past OB History:       OB History    Para Term  AB Living   3 2 2 0 1 2   SAB IAB Ectopic Molar Multiple Live Births   1 0 0 0 0 2      # Outcome Date GA Lbr Akira/2nd Weight Sex Delivery Anes PTL Lv   3 Term 23 38w4d 08:41 / 00:15 3280 g (7 lb 3.7 oz) F Vag-Spont EPI N ÁLVARO      Name: Kristopher      Apgar1: 8  Apgar5: 9   2 SAB 2022           1 Term 20 37w0d 04:59 / 02:17 2935 g (6 lb 7.5 oz) M Vag-Vacuum EPI N ÁLVARO      Name: Alfredo      Apgar1: 7  Apgar5: 9      Obstetric Comments    - Alfredo    - Kristopher            SH: tob neg , EtOH neg, drugs neg    General ROS: MOHAN, N.   All other systems reviewed and are negative.      Physical Examination: General appearance - alert, well appearing, and in no distress  Vital signs - /98   Pulse 77   Temp 98.3 °F (36.8 °C) (Oral)   Resp 18   LMP 2023   SpO2 96%   Neuro: A and O X 3, Cr nn 2-12 intact, sensory normal and symmet, motor 5/5,   Gait normal  HEENT: normocephalic, atraumatic,oropharynx clear, appearance of ears and nose normal  Neck - supple, no significant adenopathy, no thyromegaly  Lymphatics - no palpable lymphadenopathy in the neck or groin, no hepatosplenomegaly  Chest - clear to auscultation, no wheezes, rales or rhonchi, respiratory effort non-labored  Heart - normal rate, regular rhythm, no murmurs, rubs, clicks or gallops, no JVD, no lower  extremity edema  Abdomen - soft, nontender, nondistended, no masses, no hepatosplenomegaly  no rebound tenderness noted, bowel sounds normal  Extremities - no pedal edema noted, no calf tend, DTR's 1+/0=  Skin -warm and dry, normal coloration and turgor, no rashes, no suspicious skin lesions noted      Labs:  Results from last 7 days   Lab Units 12/21/23  2334   WBC 10*3/mm3 7.60   HEMOGLOBIN g/dL 12.6   HEMATOCRIT % 37.7   PLATELETS 10*3/mm3 256     Results from last 7 days   Lab Units 12/21/23  2334   CREATININE mg/dL 0.57   BILIRUBIN mg/dL 0.2   ALK PHOS U/L 97   ALT (SGPT) U/L 13   AST (SGOT) U/L 16     Assessment 1)PPD #5   2)headache- poss due to procardia, improved after phenergan/morphine   3)elevated BP's- only one significantly elevated diast BP here, all others     Normal.  Labs normal    Plan 1)observe   2)continue procardia   3)pt asked to call office in am to make BP check next Wed    Blade Luke MD  12/22/2023  00:58 EST

## 2024-01-01 ENCOUNTER — HOSPITAL ENCOUNTER (OUTPATIENT)
Dept: LACTATION | Facility: HOSPITAL | Age: 34
Discharge: HOME | End: 2024-01-01

## 2024-01-01 NOTE — LACTATION NOTE
01/01/24 1200   Maternal Information   Date of Referral 01/01/24   Person Making Referral lactation consultant   Maternal Reason for Referral latch difficulty   Infant Reason for Referral sleepy;weight gain inadequate;appears hungry after feeding  (Infant is sleepy at the breast, may begin feeding awake but quickly falls to sleep.  Not back up to birthweight at 2 weeks.  Occasional latch difficulty, lipstick shaped nipples upon release of breast.)   Maternal Assessment   Breast Size Issue none   Breast Shape Bilateral:;round   Breast Density Bilateral:;full   Nipples Bilateral:;everted   Left Nipple Symptoms intact;nontender   Right Nipple Symptoms intact;nontender   Maternal Infant Feeding   Maternal Emotional State receptive   Infant Positioning cross-cradle  (adjusted from cradle hold to CC hold)   Signs of Milk Transfer audible swallow;deep jaw excursions noted   Pain with Feeding no   Comfort Measures Before/During Feeding infant position adjusted;latch adjusted;maternal position adjusted  (lips requiring manual flanging)   Nipple Shape After Feeding, Right lipstick shape   Latch Assistance minimal assistance;verbal guidance offered   Breastfeeding Supplementation   Infant Indication for Supplementation ineffective breastfeeding  (slow feeding, 30 mls in 30 minutes despite having access at the breast to ~ 5 ounces)   Breastfeeding Supplementation Type expressed breast milk   Person Feeding Infant speech language pathologist  (Jenny Munguia consulted.  Posterior lingual restriction visualized.  Jenny stated that infant had strong suction but weak/uncoordinated tongue movements.)   Method of Supplementation paced bottle   Nipple Used For Supplementation preemie   Feeding Infant   Feeding Readiness Cues crying   Satiety Cues cessation of sucking;sleeping after feeding   Feeding Tolerance/Success arousal required;disinterested;suck inconsistent   Effective Latch During Feeding yes   Suck/Swallow/Breathing  Coordination present   Prefeeding Weight (gm) 3190 g (112.5 oz)   Postfeeding Weight (gm) 3220 g (113.6 oz)   Weight Gain/Loss (gm)  30 g (1.1 oz)   Breastfeeding Session   Breastfeeding breastfeeding, right side only   Breastfeeding Time, Right (min) 30   Infant-Driven Feeding Readiness©   Infant-Driven Feeding Scales - Readiness 1   Infant-Driven Feeding Scales - Quality 2   Infant-Driven Feeding Scales - Caregiver Techniques A;B   LATCH Score   Latch 1-->repeated attempts, holds nipple in mouth, stimulate to suck   Audible Swallowing 1-->a few with stimulation   Type of Nipple 2-->everted (after stimulation)   Comfort (Breast/Nipple) 2-->soft/nontender   Hold (Positioning) 1-->minimal assist, teach one side, mother does other, staff holds   Latch Score 7   Milk Expression/Equipment   Breast Pump Type double electric, hospital grade   Breast Pump Flange Type hard   Breast Pump Flange Size 24 mm   Equipment for Home Use   (spectra pump at home)   Breast Pumping   Breast Pumping Interventions post-feed pumping encouraged   Breast Pumping double electric breast pump utilized

## 2024-01-01 NOTE — LACTATION NOTE
INFANT: Kristopher Hernandez  : 2022  PEDIATRICIAN: Jose  AGE: 2 weeks  GA @ BIRTH: 38 weeks 4 days  BIRTH WEIGHT: 7 pounds 4 ounces  LAST OFFICE WEIGHT (): 6 pounds 11 ounces  CURRENT WEIGHT (): 7 pounds 0.6 ounces    REASON FOR VISIT: Mom concerned about infant's shallow latch and sleepiness during feedings.  Mom is breast-feeding during the day and then pumping at night and giving bottles.  Infant is taking 3 ounces of breastmilk in a bottle.    BREAST/SUPPLY CONCERNS: Mom was able to pump between 4 to 8 ounces of breastmilk when infant is not nursing for the feeding.  No clogs or engorgement.  No concerns about milk supply.    OBJECTIVE / EXAM / ASSESSMENT:  Assisted mom with latching infant deeply on the right breast.  Changed her positioning from cradle hold to cross cradle, nipple to nose positioning with an asymmetrical latch.  Infant latched well but was very sleepy at the breast and difficult to stimulate for feeding.  Removed from breast after 7 minutes due to sleeping.  Infant weighed, transferred 10 mL.  Replaced infant at the breast with a medium shield used by mom.  Infant fed for 10 minutes with more sustained sucking bursts but few swallows heard.  Weight infant after 10 minutes but no milk was transferred with a shield.  Replaced infant to the breast without the nipple shield and stimulated throughout feeding.  Mom used breast compression to assist infant with nursing.  Nipples appear to lipstick shaped after release of the latch.  Infant transferred another 20 mL on the right breast after nursing for 10 minutes.    Infant nursed for a total of 27 minutes and transferred 30 mL.  Mom was able to pump post nursing and obtain over 3 ounces of breastmilk.  [facility] with a paced bottlefeeding method using a preemie bottle.  Jenny noted that infant has strong suction but uncoordinated tongue motions.  Downward pressure needed to assist  infant with moving tongue correctly at the bottle.    Oral assessment by Jenny Munguia, [facility], and myself.  Posterior lingual restriction noted.  Due to mom's good milk supply and infant's ability to have suction, we think that through tongue stretching, palate desensitization, and paced bottlefeeding with a preemie bottle infant suck can become more rhythmic and effective.  Mom to go home and try new techniques at the breast, supplement infant after nursing, and pump to maintain supply.  Will follow-up with mom and infant next week.    PLAN OF CARE:   Nurse infant whenever mom desires but limit time at the breast to 15 to 20 minutes.  After nursing supplement with 1 to 2 ounces of expressed breastmilk.  Mom to pump after nursing for 10 to 15 minutes.  Supplement infant with preabigail Caldera bottles in a paced bottlefeeding position.  Oral stretches 4 times a day.  Follow-up with lactation on January 10 at 12:45 PM.

## 2024-01-02 PROBLEM — O26.899 HEADACHE IN PREGNANCY, DELIVERED WITH POSTPARTUM COMPLICATION: Status: RESOLVED | Noted: 2023-12-22 | Resolved: 2024-01-02

## 2024-01-02 PROBLEM — R51.9 HEADACHE IN PREGNANCY, DELIVERED WITH POSTPARTUM COMPLICATION: Status: RESOLVED | Noted: 2023-12-22 | Resolved: 2024-01-02

## 2024-01-02 RX ORDER — CEPHALEXIN 500 MG/1
500 CAPSULE ORAL 4 TIMES DAILY
Qty: 40 CAPSULE | Refills: 0 | Status: SHIPPED | OUTPATIENT
Start: 2024-01-02 | End: 2024-01-03

## 2024-01-03 ENCOUNTER — OFFICE VISIT (OUTPATIENT)
Dept: INTERNAL MEDICINE | Facility: CLINIC | Age: 34
End: 2024-01-03
Payer: MEDICAID

## 2024-01-03 VITALS
DIASTOLIC BLOOD PRESSURE: 68 MMHG | TEMPERATURE: 97.1 F | HEIGHT: 65 IN | BODY MASS INDEX: 34.99 KG/M2 | WEIGHT: 210 LBS | SYSTOLIC BLOOD PRESSURE: 104 MMHG | HEART RATE: 80 BPM

## 2024-01-03 DIAGNOSIS — Z00.00 ROUTINE MEDICAL EXAM: Primary | ICD-10-CM

## 2024-01-03 NOTE — PROGRESS NOTES
Izzy Hernandez  1990  [id number]  Patient Care Team:  Emily Paul APRN as PCP - General (Internal Medicine)  Isaias Wen MD as Obstetrician (Obstetrics and Gynecology)    Izzy Hernandez is a 33 y.o. pleasant female here today for annual physical.  Chief Complaint   Patient presents with    Annual Exam       HPI:   Jamila is here for routine physical.  She delivered her second child on 12/16/2023 a little girl named Kristopher.  Her son is now 3.  After delivery she had some high blood pressure and was put on Procardia 20 mg 3 times daily around 12/20/2023.  Blood pressure today in office 104/68 but has been closer to 120/70 at home when she is checked it.  No leg swelling.  She is pumping and trying to breast-feed.  She did have some left breast tenderness recently that she thought was mastitis but it resolved with increased feedings and she did not need the antibiotic that was sent.  She denies postpartum depression or baby blues at this time and is feeling pretty well.  She did not have postpartum depression after her first delivery.  She has a good support system her  will be off for 14 weeks and she will take 12 weeks.  She works from home and will be putting the baby in taking with her son at [facility]  facility when she goes back to work.    She denies constipation, diarrhea, leg swelling, depression.  Past Medical History:   Diagnosis Date    Gestational hypertension     Gestational hypertension, third trimester 10/29/2020    H/O melanoma in situ - on back 06/2018     Past Surgical History:   Procedure Laterality Date    CHOLECYSTECTOMY WITH INTRAOPERATIVE CHOLANGIOGRAM N/A 11/25/2020    Procedure: CHOLECYSTECTOMY LAPAROSCOPIC INTRAOPERATIVE CHOLANGIOGRAM;  Surgeon: Dennis Zhou MD;  Location: [facility];  Service: General;  Laterality: N/A;    D & C WITH SUCTION  06/15/2022    SKIN CANCER EXCISION  06/2018    MIS on back    WISDOM TOOTH EXTRACTION  2012  "    Family History   Problem Relation Age of Onset    Diabetes Father      Social History     Tobacco Use   Smoking Status Never   Smokeless Tobacco Never     No Known Allergies    Current Outpatient Medications:     ibuprofen (ADVIL,MOTRIN) 600 MG tablet, Take 1 tablet by mouth Every 6 (Six) Hours As Needed for Mild Pain., Disp: 60 tablet, Rfl: 1    niCARdipine (CARDENE) 20 MG capsule, Take 1 capsule by mouth 3 (Three) Times a Day for 30 days., Disp: 90 capsule, Rfl: 0    Prenatal Vit-Fe Fumarate-FA (PRENATAL VITAMIN PO), Take 1 tablet by mouth Daily., Disp: , Rfl:     Review of Systems    /68 (BP Location: Right arm, Patient Position: Sitting, Cuff Size: Adult)   Pulse 80   Temp 97.1 °F (36.2 °C) (Temporal)   Ht 166 cm (65.35\")   Wt 95.3 kg (210 lb)   LMP 03/21/2023   BMI 34.57 kg/m²     Physical Exam  Vitals reviewed.   Constitutional:       Appearance: Normal appearance. She is well-developed.   HENT:      Head: Normocephalic.      Right Ear: External ear normal.      Left Ear: External ear normal.   Eyes:      Conjunctiva/sclera: Conjunctivae normal.      Pupils: Pupils are equal, round, and reactive to light.   Cardiovascular:      Rate and Rhythm: Normal rate and regular rhythm.      Pulses: Normal pulses.      Heart sounds: Normal heart sounds.   Pulmonary:      Effort: Pulmonary effort is normal.      Breath sounds: Normal breath sounds.   Abdominal:      General: Abdomen is flat. Bowel sounds are normal.      Palpations: Abdomen is soft.   Musculoskeletal:         General: Normal range of motion.      Cervical back: Normal range of motion and neck supple.      Right lower leg: No edema.      Left lower leg: No edema.   Skin:     General: Skin is warm and dry.   Neurological:      Mental Status: She is alert and oriented to person, place, and time.   Psychiatric:         Mood and Affect: Mood normal.         Behavior: Behavior normal.         Thought Content: Thought content normal.         " Judgment: Judgment normal.         Results Review:  I reviewed the patient's new clinical results.    PHQ-9 Total Score:           Assessment/Plan:  Diagnoses and all orders for this visit:    1. Routine medical exam (Primary)  Comments:  Follow-up with labs in 6 months, keep scheduled follow-up with OB    2. Postpartum hypertension  Comments:  Check BP daily, if low readings plus dizziness may need to decrease medication as discussed         Patient Instructions   Preventive Care 21-39 Years Old, Female  Preventive care refers to lifestyle choices and visits with your health care provider that can promote health and wellness. Preventive care visits are also called wellness exams.  What can I expect for my preventive care visit?  Counseling  During your preventive care visit, your health care provider may ask about your:  Medical history, including:  Past medical problems.  Family medical history.  Pregnancy history.  Current health, including:  Menstrual cycle.  Method of birth control.  Emotional well-being.  Home life and relationship well-being.  Sexual activity and sexual health.  Lifestyle, including:  Alcohol, nicotine or tobacco, and drug use.  Access to firearms.  Diet, exercise, and sleep habits.  Work and work environment.  Sunscreen use.  Safety issues such as seatbelt and bike helmet use.  Physical exam  Your health care provider may check your:  Height and weight. These may be used to calculate your BMI (body mass index). BMI is a measurement that tells if you are at a healthy weight.  Waist circumference. This measures the distance around your waistline. This measurement also tells if you are at a healthy weight and may help predict your risk of certain diseases, such as type 2 diabetes and high blood pressure.  Heart rate and blood pressure.  Body temperature.  Skin for abnormal spots.  What immunizations do I need?    Vaccines are usually given at various ages, according to a schedule. Your health  care provider will recommend vaccines for you based on your age, medical history, and lifestyle or other factors, such as travel or where you work.  What tests do I need?  Screening  Your health care provider may recommend screening tests for certain conditions. This may include:  Pelvic exam and Pap test.  Lipid and cholesterol levels.  Diabetes screening. This is done by checking your blood sugar (glucose) after you have not eaten for a while (fasting).  Hepatitis B test.  Hepatitis C test.  HIV (human immunodeficiency virus) test.  STI (sexually transmitted infection) testing, if you are at risk.  BRCA-related cancer screening. This may be done if you have a family history of breast, ovarian, tubal, or peritoneal cancers.  Talk with your health care provider about your test results, treatment options, and if necessary, the need for more tests.  Follow these instructions at home:  Eating and drinking    Eat a healthy diet that includes fresh fruits and vegetables, whole grains, lean protein, and low-fat dairy products.  Take vitamin and mineral supplements as recommended by your health care provider.  Do not drink alcohol if:  Your health care provider tells you not to drink.  You are pregnant, may be pregnant, or are planning to become pregnant.  If you drink alcohol:  Limit how much you have to 0-1 drink a day.  Know how much alcohol is in your drink. In the [place] one drink equals one 12 oz bottle of beer (355 mL), one 5 oz glass of wine (148 mL), or one 1½ oz glass of hard liquor (44 mL).  Lifestyle  Brush your teeth every morning and night with fluoride toothpaste. Floss one time each day.  Exercise for at least 30 minutes 5 or more days each week.  Do not use any products that contain nicotine or tobacco. These products include cigarettes, chewing tobacco, and vaping devices, such as e-cigarettes. If you need help quitting, ask your health care provider.  Do not use drugs.  If you are sexually active,  practice safe sex. Use a condom or other form of protection to prevent STIs.  If you do not wish to become pregnant, use a form of birth control. If you plan to become pregnant, see your health care provider for a prepregnancy visit.  Find healthy ways to manage stress, such as:  Meditation, yoga, or listening to music.  Journaling.  Talking to a trusted person.  Spending time with friends and family.  Minimize exposure to UV radiation to reduce your risk of skin cancer.  Safety  Always wear your seat belt while driving or riding in a vehicle.  Do not drive:  If you have been drinking alcohol. Do not ride with someone who has been drinking.  If you have been using any mind-altering substances or drugs.  While texting.  When you are tired or distracted.  Wear a helmet and other protective equipment during sports activities.  If you have firearms in your house, make sure you follow all gun safety procedures.  Seek help if you have been physically or sexually abused.  What's next?  Go to your health care provider once a year for an annual wellness visit.  Ask your health care provider how often you should have your eyes and teeth checked.  Stay up to date on all vaccines.  This information is not intended to replace advice given to you by your health care provider. Make sure you discuss any questions you have with your health care provider.  Document Revised: 06/15/2022 Document Reviewed: 06/15/2022  [employer] Patient Education © 2023 [employer]      Counseling/Anticipatory Guidance:   Plan of care reviewed with patient at the conclusion of today's visit. Education was provided in regards to diagnosis, diet and exercise, cervical cancer screening, self breast exams, breast cancer screening, and the importance of yearly mammograms.   Nutrition, family planning/contraception, physical activity, healthy weight,ways to reduce stress, adequate sleep, injury prevention, misuse of tobacco, alcohol and drugs, sexual behavior  and STD's, dental health, mental health, and immunizations.    Management and any prescribed or recommended OTC medications.  Patient verbalizes understanding of and agreement with management plan.    Return in about 6 months (around 7/3/2024) for Next scheduled follow up.    Dictated Utilizing Dragon Dictation.      Emily Paul, DEEPIKA

## 2024-01-03 NOTE — PATIENT INSTRUCTIONS
Preventive Care 21-39 Years Old, Female  Preventive care refers to lifestyle choices and visits with your health care provider that can promote health and wellness. Preventive care visits are also called wellness exams.  What can I expect for my preventive care visit?  Counseling  During your preventive care visit, your health care provider may ask about your:  Medical history, including:  Past medical problems.  Family medical history.  Pregnancy history.  Current health, including:  Menstrual cycle.  Method of birth control.  Emotional well-being.  Home life and relationship well-being.  Sexual activity and sexual health.  Lifestyle, including:  Alcohol, nicotine or tobacco, and drug use.  Access to firearms.  Diet, exercise, and sleep habits.  Work and work environment.  Sunscreen use.  Safety issues such as seatbelt and bike helmet use.  Physical exam  Your health care provider may check your:  Height and weight. These may be used to calculate your BMI (body mass index). BMI is a measurement that tells if you are at a healthy weight.  Waist circumference. This measures the distance around your waistline. This measurement also tells if you are at a healthy weight and may help predict your risk of certain diseases, such as type 2 diabetes and high blood pressure.  Heart rate and blood pressure.  Body temperature.  Skin for abnormal spots.  What immunizations do I need?    Vaccines are usually given at various ages, according to a schedule. Your health care provider will recommend vaccines for you based on your age, medical history, and lifestyle or other factors, such as travel or where you work.  What tests do I need?  Screening  Your health care provider may recommend screening tests for certain conditions. This may include:  Pelvic exam and Pap test.  Lipid and cholesterol levels.  Diabetes screening. This is done by checking your blood sugar (glucose) after you have not eaten for a while (fasting).  Hepatitis  B test.  Hepatitis C test.  HIV (human immunodeficiency virus) test.  STI (sexually transmitted infection) testing, if you are at risk.  BRCA-related cancer screening. This may be done if you have a family history of breast, ovarian, tubal, or peritoneal cancers.  Talk with your health care provider about your test results, treatment options, and if necessary, the need for more tests.  Follow these instructions at home:  Eating and drinking    Eat a healthy diet that includes fresh fruits and vegetables, whole grains, lean protein, and low-fat dairy products.  Take vitamin and mineral supplements as recommended by your health care provider.  Do not drink alcohol if:  Your health care provider tells you not to drink.  You are pregnant, may be pregnant, or are planning to become pregnant.  If you drink alcohol:  Limit how much you have to 0-1 drink a day.  Know how much alcohol is in your drink. In the [place] one drink equals one 12 oz bottle of beer (355 mL), one 5 oz glass of wine (148 mL), or one 1½ oz glass of hard liquor (44 mL).  Lifestyle  Brush your teeth every morning and night with fluoride toothpaste. Floss one time each day.  Exercise for at least 30 minutes 5 or more days each week.  Do not use any products that contain nicotine or tobacco. These products include cigarettes, chewing tobacco, and vaping devices, such as e-cigarettes. If you need help quitting, ask your health care provider.  Do not use drugs.  If you are sexually active, practice safe sex. Use a condom or other form of protection to prevent STIs.  If you do not wish to become pregnant, use a form of birth control. If you plan to become pregnant, see your health care provider for a prepregnancy visit.  Find healthy ways to manage stress, such as:  Meditation, yoga, or listening to music.  Journaling.  Talking to a trusted person.  Spending time with friends and family.  Minimize exposure to UV radiation to reduce your risk of skin  cancer.  Safety  Always wear your seat belt while driving or riding in a vehicle.  Do not drive:  If you have been drinking alcohol. Do not ride with someone who has been drinking.  If you have been using any mind-altering substances or drugs.  While texting.  When you are tired or distracted.  Wear a helmet and other protective equipment during sports activities.  If you have firearms in your house, make sure you follow all gun safety procedures.  Seek help if you have been physically or sexually abused.  What's next?  Go to your health care provider once a year for an annual wellness visit.  Ask your health care provider how often you should have your eyes and teeth checked.  Stay up to date on all vaccines.  This information is not intended to replace advice given to you by your health care provider. Make sure you discuss any questions you have with your health care provider.  Document Revised: 06/15/2022 Document Reviewed: 06/15/2022  Elsevier Patient Education © 2023 [employer] Inc.

## 2024-01-16 ENCOUNTER — TELEPHONE (OUTPATIENT)
Dept: OBSTETRICS AND GYNECOLOGY | Facility: CLINIC | Age: 34
End: 2024-01-16

## 2024-01-28 NOTE — PROGRESS NOTES
Subjective   Chief Complaint   Patient presents with    Postpartum Care     Izzy Hernandez is a 34 y.o. year old  presenting to be seen for her postpartum visit.  She had a vaginal delivery.  Her daughter is doing well.    She does not have concerns about post-partum blues/depression.   [place] Score = 4  She is breast feeding and plans to continues for 6 month(s).  Since delivery she has not been sexually active.  For ongoing contraception, her plans are to do nothing because of his upcoming vasectomy.    The following portions of the patient's history were reviewed and updated as appropriate:current medications and allergies    Social History    Tobacco Use      Smoking status: Never      Smokeless tobacco: Never      Review of Systems  Constitutional POS: nothing reported    NEG: anorexia or night sweats   Genitourinary POS: urge incontinene is present but it IS NOT effecting her ADL's - has not done the pelvic floor PT    NEG: dysuria or hematuria      Gastointestinal POS: nothing reported    NEG: bloating, change in bowel habits, melena, or reflux symptoms   Breast POS: nothing reported    NEG: persistent breast lump, skin dimpling, or nipple discharge        Objective   /70   Resp 14   Wt 94.3 kg (208 lb)   LMP 2023   Breastfeeding Yes   BMI 34.24 kg/m²     General:  well developed; well nourished  no acute distress   Abdomen: soft, non-tender; no masses  no umbilical or inguinal hernias are present  no hepato-splenomegaly   Pelvis: Clinical staff was present for exam  External genitalia:  normal appearance of the external genitalia including Bartholin's and [place]'s glands.  :  urethral meatus normal;  Vaginal:  normal pink mucosa without prolapse or lesions.  Cervix:  normal appearance.  Uterus:  normal size, shape and consistency.  Adnexa:  normal bimanual exam of the adnexa.  Rectal:  digital rectal exam not performed; anus visually normal appearing.          Assessment    Normal 6 week postpartum exam S/P vaginal delivery  Sub-optimal BC - she currently is not taking sufficient folic acid     Plan   BC options reviewed and compared today: condoms, IUD - Mirena, and vasectomy  Folic acid for the prevention of neural tube defects was discussed.  At least 0.4 mg should be taken preconceptionally to reduce the risk.  It was explained that this may reduce the baseline incidence of neural tube defect by as much as 65%.  Folic acid can be found in OTC multivitamins, OTC prenatal vitamins or breakfast cereal that that contains 100% of the RDA of folate.  Her vaccine record was reviewed and updated.  The importance of keeping all planned follow-up and taking all medications as prescribed was emphasized.  Follow up for annual exam 1 year           This note was electronically signed.    Isaias Wen M.D.  February 1, 2024    Part of this note may be an electronic transcription/translation of spoken language to printed text using the Dragon Dictation System.

## 2024-02-01 ENCOUNTER — POSTPARTUM VISIT (OUTPATIENT)
Dept: OBSTETRICS AND GYNECOLOGY | Facility: CLINIC | Age: 34
End: 2024-02-01
Payer: COMMERCIAL

## 2024-02-01 VITALS
BODY MASS INDEX: 34.24 KG/M2 | DIASTOLIC BLOOD PRESSURE: 70 MMHG | SYSTOLIC BLOOD PRESSURE: 106 MMHG | RESPIRATION RATE: 14 BRPM | WEIGHT: 208 LBS

## 2024-05-03 ENCOUNTER — TELEMEDICINE (OUTPATIENT)
Dept: FAMILY MEDICINE CLINIC | Facility: TELEHEALTH | Age: 34
End: 2024-05-03
Payer: COMMERCIAL

## 2024-05-03 DIAGNOSIS — B97.89 VIRAL SINUSITIS: Primary | ICD-10-CM

## 2024-05-03 DIAGNOSIS — J32.9 VIRAL SINUSITIS: Primary | ICD-10-CM

## 2024-05-03 RX ORDER — AMOXICILLIN AND CLAVULANATE POTASSIUM 875; 125 MG/1; MG/1
1 TABLET, FILM COATED ORAL 2 TIMES DAILY
Qty: 14 TABLET | Refills: 0 | Status: SHIPPED | OUTPATIENT
Start: 2024-05-03 | End: 2024-05-10

## 2024-05-03 RX ORDER — FLUTICASONE PROPIONATE 50 MCG
2 SPRAY, SUSPENSION (ML) NASAL DAILY
Qty: 16 ML | Refills: 0 | Status: SHIPPED | OUTPATIENT
Start: 2024-05-03

## 2024-05-03 NOTE — PROGRESS NOTES
Subjective   Chief Complaint   Patient presents with    Sinus Problem       Izzy Hernandez is a 34 y.o. female.     History of Present Illness  Patient reports congestion, runny nose, scratchy throat, headache, and general unwell feeling that started about few days ago.  She is now starting to develop a cough.  No known sick contacts.  She is currently breast-feeding so has not really taken any over-the-counter medicine to avoid affecting her milk supply.  She is seeking treatment today because she is going on vacation tomorrow.  Sinus Problem  This is a new problem. Episode onset: few days. The problem has been gradually worsening since onset. There has been no fever. Associated symptoms include congestion, coughing, headaches, sinus pressure and a sore throat. Pertinent negatives include no chills, diaphoresis, ear pain, neck pain, shortness of breath or swollen glands.        No Known Allergies    Past Medical History:   Diagnosis Date    Gestational hypertension     Gestational hypertension, third trimester 10/29/2020    H/O melanoma in situ - on back 06/2018       Past Surgical History:   Procedure Laterality Date    CHOLECYSTECTOMY WITH INTRAOPERATIVE CHOLANGIOGRAM N/A 11/25/2020    Procedure: CHOLECYSTECTOMY LAPAROSCOPIC INTRAOPERATIVE CHOLANGIOGRAM;  Surgeon: Dennis Zhou MD;  Location: [facility];  Service: General;  Laterality: N/A;    D & C WITH SUCTION  06/15/2022    SKIN CANCER EXCISION  06/2018    MIS on back    WISDOM TOOTH EXTRACTION  2012       Social History     Socioeconomic History    Marital status:      Spouse name: delisa    Number of children: 0    Highest education level: Master's degree (e.g., MA, MS, Kristan, MEd, MSW, MARIS)   Tobacco Use    Smoking status: Never    Smokeless tobacco: Never   Vaping Use    Vaping status: Never Used   Substance and Sexual Activity    Alcohol use: Not Currently     Comment: Rare use when not pregnant    Drug use: Never    Sexual activity:  Defer     Partners: Male     Birth control/protection: None       Family History   Problem Relation Age of Onset    Diabetes Father          Current Outpatient Medications:     amoxicillin-clavulanate (AUGMENTIN) 875-125 MG per tablet, Take 1 tablet by mouth 2 (Two) Times a Day for 7 days., Disp: 14 tablet, Rfl: 0    fluticasone (FLONASE) 50 MCG/ACT nasal spray, 2 sprays into the nostril(s) as directed by provider Daily., Disp: 16 mL, Rfl: 0    ibuprofen (ADVIL,MOTRIN) 600 MG tablet, Take 1 tablet by mouth Every 6 (Six) Hours As Needed for Mild Pain., Disp: 60 tablet, Rfl: 1      Review of Systems   Constitutional:  Positive for activity change and fatigue. Negative for chills, diaphoresis and fever.   HENT:  Positive for congestion, rhinorrhea, sinus pressure and sore throat. Negative for ear pain and swollen glands.    Respiratory:  Positive for cough. Negative for chest tightness and shortness of breath.    Gastrointestinal: Negative.    Musculoskeletal:  Negative for neck pain.   Neurological:  Positive for headache.        There were no vitals filed for this visit.    Objective   Physical Exam  Constitutional:       General: She is not in acute distress.     Appearance: Normal appearance. She is not ill-appearing, toxic-appearing or diaphoretic.   HENT:      Head: Normocephalic.      Nose: Congestion and rhinorrhea present.      Mouth/Throat:      Lips: Pink.      Mouth: Mucous membranes are moist.   Pulmonary:      Effort: Pulmonary effort is normal.   Neurological:      Mental Status: She is alert and oriented to person, place, and time.          Procedures     Assessment & Plan   Diagnoses and all orders for this visit:    1. Viral sinusitis (Primary)  -     fluticasone (FLONASE) 50 MCG/ACT nasal spray; 2 sprays into the nostril(s) as directed by provider Daily.  Dispense: 16 mL; Refill: 0    Other orders  -     amoxicillin-clavulanate (AUGMENTIN) 875-125 MG per tablet; Take 1 tablet by mouth 2 (Two) Times a  Day for 7 days.  Dispense: 14 tablet; Refill: 0    Symptomatic treatment.   Alternate tylenol and motrin for pain and/or fever, stay hydrated and rest.     If symptoms do not improve, you continue to have sinus congestion, pressure/pain or purulent drainage you may start the antibiotics.    If symptoms worsen or do not improve follow up with your PCP or visit your nearest Urgent Care Center or ER.      PLAN: Discussed dosing, side effects, recommended other symptomatic care.  Patient should follow up with primary care provider, Urgent Care or ER if symptoms worsen, fail to resolve or other symptoms need attention. Patient/family agree to the above.         DEEPIKA Encarnacion     The use of a video visit has been reviewed with the patient and verbal informed consent has been obtained. Myself and Izzy Hernandez participated in this visit. The patient is located at [address] [place] [address]. I am located in [place], [place]. Mychart and Zoom were utilized.        This visit was performed via Telehealth.  This patient has been instructed to follow-up with their primary care provider if their symptoms worsen or the treatment provided does not resolve their illness.

## 2024-05-03 NOTE — PATIENT INSTRUCTIONS
Symptomatic treatment.   Alternate tylenol and motrin for pain and/or fever, stay hydrated and rest.     If symptoms do not improve, you continue to have sinus congestion, pressure/pain or purulent drainage you may start the antibiotics.    If symptoms worsen or do not improve follow up with your PCP or visit your nearest Urgent Care Center or ER.

## 2024-06-30 ENCOUNTER — PATIENT MESSAGE (OUTPATIENT)
Dept: INTERNAL MEDICINE | Facility: CLINIC | Age: 34
End: 2024-06-30
Payer: COMMERCIAL

## 2024-07-01 DIAGNOSIS — Z13.0 SCREENING FOR DEFICIENCY ANEMIA: Primary | ICD-10-CM

## 2024-07-01 DIAGNOSIS — Z13.220 LIPID SCREENING: ICD-10-CM

## 2024-07-01 DIAGNOSIS — Z13.29 THYROID DISORDER SCREENING: ICD-10-CM

## 2024-07-01 DIAGNOSIS — Z13.228 SCREENING FOR METABOLIC DISORDER: ICD-10-CM

## 2024-07-01 NOTE — TELEPHONE ENCOUNTER
From: Izzy Hernandez  To: Emily Paul  Sent: 6/30/2024 8:09 AM EDT  Subject: Lab Orders    Is it possible for me to get my labs done prior to my appointment on Wednesday? I believe that was the main reason for this appointment. Thanks!

## 2024-07-02 ENCOUNTER — LAB (OUTPATIENT)
Dept: LAB | Facility: HOSPITAL | Age: 34
End: 2024-07-02
Payer: COMMERCIAL

## 2024-07-02 DIAGNOSIS — Z13.228 SCREENING FOR METABOLIC DISORDER: ICD-10-CM

## 2024-07-02 DIAGNOSIS — Z13.29 THYROID DISORDER SCREENING: ICD-10-CM

## 2024-07-02 DIAGNOSIS — Z13.220 LIPID SCREENING: ICD-10-CM

## 2024-07-02 DIAGNOSIS — Z13.0 SCREENING FOR DEFICIENCY ANEMIA: ICD-10-CM

## 2024-07-02 LAB
ALBUMIN SERPL-MCNC: 4.2 G/DL (ref 3.5–5.2)
ALBUMIN/GLOB SERPL: 1.7 G/DL
ALP SERPL-CCNC: 65 U/L (ref 39–117)
ALT SERPL W P-5'-P-CCNC: 11 U/L (ref 1–33)
ANION GAP SERPL CALCULATED.3IONS-SCNC: 8.4 MMOL/L (ref 5–15)
AST SERPL-CCNC: 17 U/L (ref 1–32)
BASOPHILS # BLD AUTO: 0.02 10*3/MM3 (ref 0–0.2)
BASOPHILS NFR BLD AUTO: 0.5 % (ref 0–1.5)
BILIRUB SERPL-MCNC: 0.3 MG/DL (ref 0–1.2)
BUN SERPL-MCNC: 11 MG/DL (ref 6–20)
BUN/CREAT SERPL: 16.4 (ref 7–25)
CALCIUM SPEC-SCNC: 9.3 MG/DL (ref 8.6–10.5)
CHLORIDE SERPL-SCNC: 108 MMOL/L (ref 98–107)
CHOLEST SERPL-MCNC: 158 MG/DL (ref 0–200)
CO2 SERPL-SCNC: 23.6 MMOL/L (ref 22–29)
CREAT SERPL-MCNC: 0.67 MG/DL (ref 0.57–1)
DEPRECATED RDW RBC AUTO: 37.4 FL (ref 37–54)
EGFRCR SERPLBLD CKD-EPI 2021: 118.5 ML/MIN/1.73
EOSINOPHIL # BLD AUTO: 0.09 10*3/MM3 (ref 0–0.4)
EOSINOPHIL NFR BLD AUTO: 2.2 % (ref 0.3–6.2)
ERYTHROCYTE [DISTWIDTH] IN BLOOD BY AUTOMATED COUNT: 11.7 % (ref 12.3–15.4)
GLOBULIN UR ELPH-MCNC: 2.5 GM/DL
GLUCOSE SERPL-MCNC: 94 MG/DL (ref 65–99)
HCT VFR BLD AUTO: 38.3 % (ref 34–46.6)
HDLC SERPL-MCNC: 66 MG/DL (ref 40–60)
HGB BLD-MCNC: 12.6 G/DL (ref 12–15.9)
IMM GRANULOCYTES # BLD AUTO: 0 10*3/MM3 (ref 0–0.05)
IMM GRANULOCYTES NFR BLD AUTO: 0 % (ref 0–0.5)
LDLC SERPL CALC-MCNC: 83 MG/DL (ref 0–100)
LDLC/HDLC SERPL: 1.27 {RATIO}
LYMPHOCYTES # BLD AUTO: 1.35 10*3/MM3 (ref 0.7–3.1)
LYMPHOCYTES NFR BLD AUTO: 33.1 % (ref 19.6–45.3)
MCH RBC QN AUTO: 29.1 PG (ref 26.6–33)
MCHC RBC AUTO-ENTMCNC: 32.9 G/DL (ref 31.5–35.7)
MCV RBC AUTO: 88.5 FL (ref 79–97)
MONOCYTES # BLD AUTO: 0.38 10*3/MM3 (ref 0.1–0.9)
MONOCYTES NFR BLD AUTO: 9.3 % (ref 5–12)
NEUTROPHILS NFR BLD AUTO: 2.24 10*3/MM3 (ref 1.7–7)
NEUTROPHILS NFR BLD AUTO: 54.9 % (ref 42.7–76)
NRBC BLD AUTO-RTO: 0 /100 WBC (ref 0–0.2)
PLATELET # BLD AUTO: 199 10*3/MM3 (ref 140–450)
PMV BLD AUTO: 11.1 FL (ref 6–12)
POTASSIUM SERPL-SCNC: 4.2 MMOL/L (ref 3.5–5.2)
PROT SERPL-MCNC: 6.7 G/DL (ref 6–8.5)
RBC # BLD AUTO: 4.33 10*6/MM3 (ref 3.77–5.28)
SODIUM SERPL-SCNC: 140 MMOL/L (ref 136–145)
TRIGL SERPL-MCNC: 41 MG/DL (ref 0–150)
TSH SERPL DL<=0.05 MIU/L-ACNC: 2.02 UIU/ML (ref 0.27–4.2)
VLDLC SERPL-MCNC: 9 MG/DL (ref 5–40)
WBC NRBC COR # BLD AUTO: 4.08 10*3/MM3 (ref 3.4–10.8)

## 2024-07-02 PROCEDURE — 80050 GENERAL HEALTH PANEL: CPT

## 2024-07-02 PROCEDURE — 80061 LIPID PANEL: CPT

## 2024-07-03 ENCOUNTER — OFFICE VISIT (OUTPATIENT)
Dept: INTERNAL MEDICINE | Facility: CLINIC | Age: 34
End: 2024-07-03
Payer: COMMERCIAL

## 2024-07-03 VITALS
WEIGHT: 202.6 LBS | SYSTOLIC BLOOD PRESSURE: 102 MMHG | HEIGHT: 65 IN | TEMPERATURE: 97.7 F | HEART RATE: 78 BPM | DIASTOLIC BLOOD PRESSURE: 70 MMHG | BODY MASS INDEX: 33.76 KG/M2

## 2024-07-03 DIAGNOSIS — F43.9 STRESS: ICD-10-CM

## 2024-07-03 DIAGNOSIS — E66.9 OBESITY (BMI 30.0-34.9): ICD-10-CM

## 2024-07-03 DIAGNOSIS — Z00.00 ROUTINE MEDICAL EXAM: Primary | ICD-10-CM

## 2024-07-03 PROCEDURE — 99214 OFFICE O/P EST MOD 30 MIN: CPT | Performed by: NURSE PRACTITIONER

## 2024-07-03 NOTE — PROGRESS NOTES
Izzy Hernandez  1990  [id number]  Patient Care Team:  Emily Paul APRN as PCP - General (Internal Medicine)  Isaias Wen MD as Obstetrician (Obstetrics and Gynecology)    Izzy Hernandez is a pleasant 34 y.o. female who presents for evaluation of postpartum hypertension (6 months follow-up)    Chief Complaint   Patient presents with    postpartum hypertension     6 months follow-up       HPI:   History of Present Illness  The patient is a 33-year-old female who presents for routine follow.    Izzy delivered their second child in December.  She returned to the hospital few days later for hypertension.  She was on a regimen of Procardia for approximately a month. She has a home blood pressure monitor, but has not been monitoring her blood pressure recently. She denies experiencing dizziness, headaches, or leg swelling. She is not currently on any medications or over-the-counter medications. She continues to breastfeed. Despite her efforts to exercise, she finds it challenging to maintain this due to her breastfeeding and persistent hunger. She has been walking 3 miles daily for the past 1.5 to 2 weeks. She maintains adequate hydration. Her weight has returned to her pre-pregnancy weight. She experiences hunger until bedtime, despite not consuming breakfast. She occasionally monitors her diet, but struggles with adequate protein intake. She participated in a program with Laxmi Martel, where her protein intake should be between 90 and 100 g. She attempts to incorporate protein powder and yogurt into her diet, but not daily. She works from home and attempts to avoid snacking. She denies experiencing depression, but reports stress due to caring for her mother. She is considering counseling. She denies experiencing reflux, nausea, or vomiting, but reports occasional heartburn when consuming spicy foods. She denies palpitations, constipation, or diarrhea. She is not menstruating and  "is not currently using any form of birth control.    Results      Past Medical History:   Diagnosis Date    Gestational hypertension     Gestational hypertension, third trimester 10/29/2020    H/O melanoma in situ - on back 06/2018     Past Surgical History:   Procedure Laterality Date    CHOLECYSTECTOMY WITH INTRAOPERATIVE CHOLANGIOGRAM N/A 11/25/2020    Procedure: CHOLECYSTECTOMY LAPAROSCOPIC INTRAOPERATIVE CHOLANGIOGRAM;  Surgeon: Dennis Zhou MD;  Location: [facility];  Service: General;  Laterality: N/A;    D & C WITH SUCTION  06/15/2022    SKIN CANCER EXCISION  06/2018    MIS on back    WISDOM TOOTH EXTRACTION  2012     Family History   Problem Relation Age of Onset    Diabetes Father      Social History     Tobacco Use   Smoking Status Never   Smokeless Tobacco Never     No Known Allergies  No current outpatient medications on file.    Review of Systems  Review of systems was completed, and pertinent findings are noted in the HPI.  /70 (BP Location: Right arm, Patient Position: Sitting, Cuff Size: Adult)   Pulse 78   Temp 97.7 °F (36.5 °C) (Infrared)   Ht 166 cm (65.35\")   Wt 91.9 kg (202 lb 9.6 oz)   BMI 33.35 kg/m²     Physical Exam  Constitutional:       Appearance: She is well-developed.   HENT:      Head: Normocephalic and atraumatic.   Eyes:      Conjunctiva/sclera: Conjunctivae normal.      Pupils: Pupils are equal, round, and reactive to light.   Cardiovascular:      Rate and Rhythm: Normal rate and regular rhythm.      Pulses: Normal pulses.      Heart sounds: Normal heart sounds.   Pulmonary:      Effort: Pulmonary effort is normal.      Breath sounds: Normal breath sounds.   Musculoskeletal:         General: Normal range of motion.      Cervical back: Normal range of motion and neck supple.   Skin:     General: Skin is warm and dry.   Neurological:      Mental Status: She is alert and oriented to person, place, and time.   Psychiatric:         Mood and Affect: Mood normal.         " Behavior: Behavior normal.         Thought Content: Thought content normal.         Judgment: Judgment normal.       Physical Exam  Vital Signs  Vitals show a blood pressure of 102/70.    PHQ-9 Total Score:      Assessment/Plan:  Assessment & Plan  1.  Postpartum hypertension  BP is acceptable.  The patient's laboratory results are satisfactory.     2.  Family stressor  A referral will be made for counseling.    Follow-up  A follow-up visit is scheduled for 1 year from now.  There are no Patient Instructions on file for this visit.  Plan of care reviewed with patient at the conclusion of today's visit. Education was provided regarding diagnosis, management and any prescribed or recommended OTC medications.  Patient verbalizes understanding of and agreement with management plan.    Return in about 1 year (around 7/3/2025) for Annual physical.    Dictated Utilizing Dragon Dictation.    DEEPIKA Milan       Patient or patient representative verbalized consent for the use of Ambient Listening during the visit with  DEEPIKA Milan for chart documentation. 7/3/2024  17:22 EDT

## 2024-07-25 ENCOUNTER — OFFICE VISIT (OUTPATIENT)
Dept: OBSTETRICS AND GYNECOLOGY | Facility: CLINIC | Age: 34
End: 2024-07-25
Payer: COMMERCIAL

## 2024-07-25 VITALS
BODY MASS INDEX: 33.58 KG/M2 | WEIGHT: 204 LBS | DIASTOLIC BLOOD PRESSURE: 80 MMHG | SYSTOLIC BLOOD PRESSURE: 110 MMHG | RESPIRATION RATE: 14 BRPM

## 2024-07-25 DIAGNOSIS — N81.6 CYSTOCELE WITH RECTOCELE: Primary | ICD-10-CM

## 2024-07-25 DIAGNOSIS — N81.10 CYSTOCELE WITH RECTOCELE: Primary | ICD-10-CM

## 2024-07-25 PROCEDURE — 99213 OFFICE O/P EST LOW 20 MIN: CPT | Performed by: OBSTETRICS & GYNECOLOGY

## 2024-07-25 NOTE — PROGRESS NOTES
Subjective   Chief Complaint   Patient presents with    Pelvic Pain     Izzy Hernandez is a 34 y.o. year old  presenting to be seen because of concerns related to pelvic organ prolapse.  She really comes in mainly just to make sure nothing is wrong.  She does not necessarily feel that she is bothered enough by things to have them fixed.  Symptoms started about 2 weeks ago with low back pain mostly in the midline.  Over the last couple days she is feeling more suprapubic pelvic pain and pressure.  There is been no issue with fever.  No dysuria or blood in the urine.  She has very little urinary incontinence.  She is not sexually active often mainly because she is still very fatigued from being postpartum.  She is not sleeping well most nights.  Generally speaking bowel function is normal.    The following portions of the patient's history were reviewed and updated as appropriate:current medications and allergies    Social History    Tobacco Use      Smoking status: Never      Smokeless tobacco: Never    Review of Systems  Constitutional POS: nothing reported    NEG: anorexia or night sweats   Genitourinary POS: see HPI    NEG: dysuria or hematuria   Gastointestinal POS: see HPI    NEG: bloating, change in bowel habits, melena, or reflux symptoms   Integument POS: nothing reported    NEG: moles that are changing in size, shape, color or rashes         Objective   /80   Resp 14   Wt 92.5 kg (204 lb)   Breastfeeding Yes   BMI 33.58 kg/m²     General:  well developed; well nourished  no acute distress   Skin:  No suspicious lesions seen   Pelvis: Clinical staff was present for exam  External genitalia:  normal appearance of the external genitalia including Bartholin's and [employer]'s glands.  :  urethral meatus normal;  Vaginal:  normal pink mucosa without prolapse or lesions.  Cervix:  normal appearance.  Uterus:  normal size, shape and consistency.  Adnexa:  non palpable bilaterally.  Rectal:   digital rectal exam not performed; anus visually normal appearing.  Cystocele GRADE 1  Rectocele GRADE 1  Uterine GRADE 0     Lab Review   No data reviewed    Imaging   No data reviewed        Assessment   Minimal pelvic organ prolapse.  At this point no workup or treatment is recommended     Plan   I reviewed that pelvic organ prolapse can progress with increasing weight, increasing lifting, increased and straining with bowel movements  Have reviewed the pelvic prolapse can be somehow improved with Kegel's exercises.  At this point is not bothering her enough to do something about it further and I reassured her this is a common finding having had 2 children  I did explain that I think is highly probable that some of the symptoms related to chronic fatigue as she still not sleeping well at 7 months after delivery  The importance of keeping all planned follow-up and taking all medications as prescribed was emphasized.  Follow up PRN             This note was electronically signed.    Isaias Wen M.D.  July 25, 2024    Part of this note may be an electronic transcription/translation of spoken language to printed text using the Dragon Dictation System.

## 2025-02-27 ENCOUNTER — OFFICE VISIT (OUTPATIENT)
Dept: OBSTETRICS AND GYNECOLOGY | Facility: CLINIC | Age: 35
End: 2025-02-27
Payer: COMMERCIAL

## 2025-02-27 VITALS
BODY MASS INDEX: 29.96 KG/M2 | SYSTOLIC BLOOD PRESSURE: 112 MMHG | WEIGHT: 182 LBS | RESPIRATION RATE: 14 BRPM | DIASTOLIC BLOOD PRESSURE: 78 MMHG

## 2025-02-27 DIAGNOSIS — Z71.85 VACCINE COUNSELING: ICD-10-CM

## 2025-02-27 DIAGNOSIS — Z01.419 WELL WOMAN EXAM: Primary | ICD-10-CM

## 2025-02-27 PROCEDURE — 99395 PREV VISIT EST AGE 18-39: CPT | Performed by: OBSTETRICS & GYNECOLOGY

## 2025-02-27 NOTE — PROGRESS NOTES
Subjective   Chief Complaint   Patient presents with    Gynecologic Exam     Izzy Hernandez is a 35 y.o. year old  presenting to be seen for her annual exam.  Has been having urinary incontinence and for a little less than 2 months has been seeing pelvic floor physical therapy.  So far not a lot of results but is not giving up yet.    SEXUAL Hx:  She is currently sexually active.  In the past year there there has been NO new sexual partners.    Condoms are always used.  She would not like to be screened for STD's at today's exam.  Current birth control method: condoms.  Vasectomy will be happening this year.  She is happy with her current method of contraception and does not want to discuss alternative methods of contraception.  MENSTRUAL Hx:  Patient's last menstrual period was 02/10/2025.  In the past 6 months her cycles have been regular, predictable and occur monthly.  Her menstrual flow is typically normal.   Each month on average there are roughly 0 day(s) of very heavy flow.  Intermenstrual bleeding is absent.    Post-coital bleeding is absent.  Dysmenorrhea: is not affecting her activities of daily living  PMS: is not affecting her activities of daily living  Her cycles are not a source of concern for her that she wishes to discuss today.  HEALTH Hx:  She exercises regularly: yes.  She wears her seat belt: yes.  She has concerns about domestic violence: no.    The following portions of the patient's history were reviewed and updated as appropriate:problem list, current medications, allergies, past family history, past medical history, past social history, and past surgical history.    Social History    Tobacco Use      Smoking status: Never      Smokeless tobacco: Never    Review of Systems  Constitutional POS: nothing reported    NEG: anorexia or night sweats   Genitourinary POS: see HPI    NEG: dysuria or hematuria      Gastointestinal POS: nothing reported    NEG: bloating, change in bowel  habits, melena, or reflux symptoms   Integument POS: nothing reported and she sees her dermatologist for routine skins exams    NEG: moles that are changing in size, shape, color or rashes   Breast POS: nothing reported    NEG: persistent breast lump, skin dimpling, or nipple discharge        Objective   /78   Resp 14   Wt 82.6 kg (182 lb)   LMP 02/10/2025   Breastfeeding No   BMI 29.96 kg/m²     General:  well developed; well nourished  no acute distress  mentation appropriate   Skin:  No suspicious lesions seen   Thyroid: normal to inspection and palpation   Breasts:  Examined in supine position  Symmetric without masses or skin dimpling  Nipples normal without inversion, lesions or discharge  There are no palpable axillary nodes   Abdomen: soft, non-tender; no masses  no umbilical or inguinal hernias are present  no hepato-splenomegaly   Pelvis: Clinical staff was present for exam  External genitalia:  normal appearance of the external genitalia including Bartholin's and [place]'s glands.  :  urethral meatus normal; urethra normal:  Vaginal:  normal pink mucosa without prolapse or lesions.  Cervix:  normal appearance.  Uterus:  normal size, shape and consistency. retroverted;  Adnexa:  normal bimanual exam of the adnexa.  Rectal:  digital rectal exam not performed; anus visually normal appearing.        Assessment   Normal GYN exam  RONN -seeing pelvic floor physical therapy     Plan   Pap was done today.  If she does not receive the results of the Pap within 2 weeks  time, she was instructed to call to find out the results.  I explained to Izzy that the recommendations for Pap smear interval in a low risk patient's has lengthened to 3 years time.  I encouraged her to be seen yearly for a full physical exam including breast and pelvic exam even during the off years when PAP's will not be performed.  I have counseled the patient on the importance of staying up to date with preventive vaccines as well  as the risks and benefits of these vaccines.  Her vaccine record was reviewed and updated.  The importance of keeping all planned follow-up and taking all medications as prescribed was emphasized.  Follow up for annual exam 1 year           This note was electronically signed.    Isaias Wen M.D.  February 27, 2025    Part of this note may be an electronic transcription/translation of spoken language to printed text using the Dragon Dictation System.

## 2025-03-03 LAB — REF LAB TEST METHOD: NORMAL

## 2025-03-05 ENCOUNTER — OFFICE VISIT (OUTPATIENT)
Dept: INTERNAL MEDICINE | Facility: CLINIC | Age: 35
End: 2025-03-05
Payer: COMMERCIAL

## 2025-03-05 VITALS
BODY MASS INDEX: 30.16 KG/M2 | HEIGHT: 65 IN | SYSTOLIC BLOOD PRESSURE: 120 MMHG | HEART RATE: 70 BPM | TEMPERATURE: 98 F | WEIGHT: 181 LBS | DIASTOLIC BLOOD PRESSURE: 80 MMHG | OXYGEN SATURATION: 98 %

## 2025-03-05 DIAGNOSIS — J01.00 ACUTE NON-RECURRENT MAXILLARY SINUSITIS: Primary | ICD-10-CM

## 2025-03-05 PROCEDURE — 99213 OFFICE O/P EST LOW 20 MIN: CPT | Performed by: STUDENT IN AN ORGANIZED HEALTH CARE EDUCATION/TRAINING PROGRAM

## 2025-03-05 NOTE — PROGRESS NOTES
Office Note     Name: Izzy Hernandez    : 1990     MRN: [id number]     Chief Complaint  Nasal Congestion and Cough    Subjective     History of Present Illness:  Izzy Hernandez is a 35 y.o. female who presents today for nasal congestion    History of Present Illness  The patient presents with cold symptoms, including nasal congestion and cough.    She has been experiencing these symptoms for approximately 1.5 weeks. The illness initially presented as a headache, sore throat, and runny nose. The sore throat was particularly severe for several days but showed signs of improvement over the past weekend. However, in the last 2 days, she has experienced a worsening of her cough and runny nose, accompanied by significant drainage. She also reports a severe sinus headache that has persisted for the past day. She is not experiencing any fevers or chills but admits to a mild productive cough. She has tested negative for both COVID-19 and influenza at home. It is noteworthy that her son was diagnosed with influenza last week. She has no known allergies to penicillin and reports no history of asthma or respiratory distress.    ALLERGIES  The patient has no known allergies.    Review of Systems:   Review of Systems   Constitutional:  Negative for chills and fever.   HENT:  Positive for congestion, postnasal drip, sinus pressure and sore throat.    Respiratory:  Positive for cough. Negative for shortness of breath.        Past Medical History:   Past Medical History:   Diagnosis Date    Gestational hypertension, third trimester 10/29/2020    H/O melanoma in situ - on back 2018       Past Surgical History:   Past Surgical History:   Procedure Laterality Date    CHOLECYSTECTOMY WITH INTRAOPERATIVE CHOLANGIOGRAM N/A 2020    Procedure: CHOLECYSTECTOMY LAPAROSCOPIC INTRAOPERATIVE CHOLANGIOGRAM;  Surgeon: Dennis Zhou MD;  Location: [facility];  Service: General;  Laterality: N/A;    D & C  "WITH SUCTION  06/15/2022    SKIN CANCER EXCISION  06/2018    MIS on back    WISDOM TOOTH EXTRACTION  2012       Family History:   Family History   Problem Relation Age of Onset    Diabetes Father        Social History:   Social History     Socioeconomic History    Marital status:      Spouse name: delisa    Number of children: 0    Highest education level: Master's degree (e.g., MA, MS, Kristan, MEd, MSW, MARIS)   Tobacco Use    Smoking status: Never    Smokeless tobacco: Never   Vaping Use    Vaping status: Never Used   Substance and Sexual Activity    Alcohol use: Not Currently     Comment: Rare use when not pregnant    Drug use: Never    Sexual activity: Defer     Partners: Male     Birth control/protection: None       Immunizations:   Immunization History   Administered Date(s) Administered    COVID-19 (MODERNA) 1st,2nd,3rd Dose Monovalent 08/25/2021, 09/10/2021, 09/23/2021, 10/05/2021    Flu Vaccine Intradermal Quad 18-64YR 11/05/2021    Flublok 18+yrs 10/05/2023    Influenza, Unspecified 10/02/2020, 10/28/2022, 10/04/2023    Tdap 09/10/2020        Medications:     Current Outpatient Medications:     amoxicillin-clavulanate (AUGMENTIN) 875-125 MG per tablet, Take 1 tablet by mouth 2 (Two) Times a Day for 5 days., Disp: 10 tablet, Rfl: 0    Allergies:   No Known Allergies    Objective     Vital Signs  /80 (BP Location: Left arm, Patient Position: Sitting, Cuff Size: Adult)   Pulse 70   Temp 98 °F (36.7 °C) (Infrared)   Ht 166 cm (65.35\")   Wt 82.1 kg (181 lb)   SpO2 98%   BMI 29.79 kg/m²   Body mass index is 29.79 kg/m².            Physical Exam  Constitutional:       Appearance: Normal appearance.   HENT:      Head: Normocephalic and atraumatic.   Eyes:      Extraocular Movements: Extraocular movements intact.      Conjunctiva/sclera: Conjunctivae normal.   Pulmonary:      Effort: Pulmonary effort is normal. No respiratory distress.   Skin:     General: Skin is warm and dry.   Neurological:      " General: No focal deficit present.      Mental Status: She is alert and oriented to person, place, and time.   Psychiatric:         Mood and Affect: Mood normal.         Behavior: Behavior normal.            Assessment and Plan     Assessment/Plan:  Diagnoses and all orders for this visit:    1. Acute non-recurrent maxillary sinusitis (Primary)  - Discussed that symptoms may have been 2/2 acute viral URI, however I will prescribe an antibiotic since symptoms have not improved after 10 days of supportive care  - Patient to follow-up if symptoms are not improving after treatment.  -     amoxicillin-clavulanate (AUGMENTIN) 875-125 MG per tablet; Take 1 tablet by mouth 2 (Two) Times a Day for 5 days.  Dispense: 10 tablet; Refill: 0        Follow Up  No follow-ups on file.    Patient or patient representative verbalized consent for the use of Ambient Listening during the visit with  Kira Valera MD for chart documentation. 3/5/2025  15:17 LUIS Valera MD   [facility] [place] [id number]

## 2025-07-09 ENCOUNTER — OFFICE VISIT (OUTPATIENT)
Dept: INTERNAL MEDICINE | Facility: CLINIC | Age: 35
End: 2025-07-09
Payer: COMMERCIAL

## 2025-07-09 ENCOUNTER — LAB (OUTPATIENT)
Dept: LAB | Facility: HOSPITAL | Age: 35
End: 2025-07-09
Payer: COMMERCIAL

## 2025-07-09 VITALS
WEIGHT: 172.4 LBS | BODY MASS INDEX: 28.72 KG/M2 | OXYGEN SATURATION: 99 % | HEART RATE: 80 BPM | HEIGHT: 65 IN | DIASTOLIC BLOOD PRESSURE: 72 MMHG | SYSTOLIC BLOOD PRESSURE: 108 MMHG

## 2025-07-09 DIAGNOSIS — Z13.228 SCREENING FOR METABOLIC DISORDER: ICD-10-CM

## 2025-07-09 DIAGNOSIS — Z00.00 ROUTINE MEDICAL EXAM: Primary | ICD-10-CM

## 2025-07-09 DIAGNOSIS — Z13.0 SCREENING FOR DEFICIENCY ANEMIA: ICD-10-CM

## 2025-07-09 DIAGNOSIS — Z13.220 LIPID SCREENING: ICD-10-CM

## 2025-07-09 DIAGNOSIS — Z13.29 THYROID DISORDER SCREENING: ICD-10-CM

## 2025-07-09 LAB
ALBUMIN SERPL-MCNC: 4.2 G/DL (ref 3.5–5.2)
ALBUMIN/GLOB SERPL: 1.6 G/DL
ALP SERPL-CCNC: 36 U/L (ref 39–117)
ALT SERPL W P-5'-P-CCNC: 8 U/L (ref 1–33)
ANION GAP SERPL CALCULATED.3IONS-SCNC: 9 MMOL/L (ref 5–15)
AST SERPL-CCNC: 16 U/L (ref 1–32)
BASOPHILS # BLD AUTO: 0.02 10*3/MM3 (ref 0–0.2)
BASOPHILS NFR BLD AUTO: 0.5 % (ref 0–1.5)
BILIRUB SERPL-MCNC: 0.5 MG/DL (ref 0–1.2)
BUN SERPL-MCNC: 15 MG/DL (ref 6–20)
BUN/CREAT SERPL: 20.3 (ref 7–25)
CALCIUM SPEC-SCNC: 9 MG/DL (ref 8.6–10.5)
CHLORIDE SERPL-SCNC: 105 MMOL/L (ref 98–107)
CHOLEST SERPL-MCNC: 151 MG/DL (ref 0–200)
CO2 SERPL-SCNC: 24 MMOL/L (ref 22–29)
CREAT SERPL-MCNC: 0.74 MG/DL (ref 0.57–1)
DEPRECATED RDW RBC AUTO: 37 FL (ref 37–54)
EGFRCR SERPLBLD CKD-EPI 2021: 109 ML/MIN/1.73
EOSINOPHIL # BLD AUTO: 0.09 10*3/MM3 (ref 0–0.4)
EOSINOPHIL NFR BLD AUTO: 2.1 % (ref 0.3–6.2)
ERYTHROCYTE [DISTWIDTH] IN BLOOD BY AUTOMATED COUNT: 11.5 % (ref 12.3–15.4)
GLOBULIN UR ELPH-MCNC: 2.7 GM/DL
GLUCOSE SERPL-MCNC: 92 MG/DL (ref 65–99)
HCT VFR BLD AUTO: 40 % (ref 34–46.6)
HDLC SERPL-MCNC: 57 MG/DL (ref 40–60)
HGB BLD-MCNC: 13.3 G/DL (ref 12–15.9)
IMM GRANULOCYTES # BLD AUTO: 0.01 10*3/MM3 (ref 0–0.05)
IMM GRANULOCYTES NFR BLD AUTO: 0.2 % (ref 0–0.5)
LDLC SERPL CALC-MCNC: 81 MG/DL (ref 0–100)
LDLC/HDLC SERPL: 1.43 {RATIO}
LYMPHOCYTES # BLD AUTO: 1.37 10*3/MM3 (ref 0.7–3.1)
LYMPHOCYTES NFR BLD AUTO: 32.5 % (ref 19.6–45.3)
MCH RBC QN AUTO: 29.6 PG (ref 26.6–33)
MCHC RBC AUTO-ENTMCNC: 33.3 G/DL (ref 31.5–35.7)
MCV RBC AUTO: 89.1 FL (ref 79–97)
MONOCYTES # BLD AUTO: 0.36 10*3/MM3 (ref 0.1–0.9)
MONOCYTES NFR BLD AUTO: 8.5 % (ref 5–12)
NEUTROPHILS NFR BLD AUTO: 2.37 10*3/MM3 (ref 1.7–7)
NEUTROPHILS NFR BLD AUTO: 56.2 % (ref 42.7–76)
NRBC BLD AUTO-RTO: 0 /100 WBC (ref 0–0.2)
PLATELET # BLD AUTO: 224 10*3/MM3 (ref 140–450)
PMV BLD AUTO: 11.3 FL (ref 6–12)
POTASSIUM SERPL-SCNC: 4.4 MMOL/L (ref 3.5–5.2)
PROT SERPL-MCNC: 6.9 G/DL (ref 6–8.5)
RBC # BLD AUTO: 4.49 10*6/MM3 (ref 3.77–5.28)
SODIUM SERPL-SCNC: 138 MMOL/L (ref 136–145)
TRIGL SERPL-MCNC: 63 MG/DL (ref 0–150)
TSH SERPL DL<=0.05 MIU/L-ACNC: 1.16 UIU/ML (ref 0.27–4.2)
VLDLC SERPL-MCNC: 13 MG/DL (ref 5–40)
WBC NRBC COR # BLD AUTO: 4.22 10*3/MM3 (ref 3.4–10.8)

## 2025-07-09 PROCEDURE — 80050 GENERAL HEALTH PANEL: CPT

## 2025-07-09 PROCEDURE — 80061 LIPID PANEL: CPT

## 2025-07-09 PROCEDURE — 99395 PREV VISIT EST AGE 18-39: CPT | Performed by: NURSE PRACTITIONER

## 2025-07-09 NOTE — PROGRESS NOTES
Izzy Hernandez  1990  [id number]  Patient Care Team:  Emily Paul APRN as PCP - General (Internal Medicine)  Isaias Wen MD as Obstetrician (Obstetrics and Gynecology)    Izzy Hernandez is a 35 y.o. pleasant female here today for annual physical.  Chief Complaint   Patient presents with    Annual Exam       HPI:   History of Present Illness  Izzy presents for a physical exam.    She has been attending [employer] since 01/2025, which she reports has improved her strength and energy levels. She attends the classes 3 to 5 days a week, averaging 4 days. She maintains a healthy diet with adequate protein intake and is working on increasing her water intake, currently consuming about 60 ounces per day, along with 1 Coke Zero. She reports no issues with constipation or diarrhea. She sleeps well, averaging 8 hours per night, and occasionally wakes up to use the bathroom. She is also undergoing pelvic floor therapy at [facility], which she finds beneficial.    She has noticed an increase in bruising on her legs, a symptom that has persisted for some time. The bruising is not limited to her legs, as she occasionally observes it on her arms as well. She recalls being slightly anemic during her first pregnancy, for which she was prescribed iron supplements.    Her menstrual cycle is regular, occurring monthly, and while she notes that her periods are heavier than before, they are not excessively so, typically lasting 3 days. She reports no intermenstrual bleeding and is not using any form of birth control or intrauterine device (IUD).    She sees [facility] regularly.    SOCIAL HISTORY  She has 2 children, aged 18 months and 4.5 years.    FAMILY HISTORY  Her father has type 1 diabetes.       Past Medical History:   Diagnosis Date    Gestational hypertension, third trimester 10/29/2020    H/O melanoma in situ - on back 06/2018     Past Surgical History:   Procedure Laterality  "Date    CHOLECYSTECTOMY WITH INTRAOPERATIVE CHOLANGIOGRAM N/A 11/25/2020    Procedure: CHOLECYSTECTOMY LAPAROSCOPIC INTRAOPERATIVE CHOLANGIOGRAM;  Surgeon: Dennis Zhou MD;  Location: [facility];  Service: General;  Laterality: N/A;    D & C WITH SUCTION  06/15/2022    LAPAROSCOPIC CHOLECYSTECTOMY  11/2020    SKIN CANCER EXCISION  06/2018    MIS on back    WISDOM TOOTH EXTRACTION  2012     Family History   Problem Relation Age of Onset    Diabetes Father      Social History     Tobacco Use   Smoking Status Never   Smokeless Tobacco Never     No Known Allergies  No current outpatient medications on file.      Review of systems was completed, and pertinent findings are noted in the HPI.    /72 (BP Location: Left arm, Patient Position: Sitting, Cuff Size: Adult)   Pulse 80   Ht 166 cm (65.35\")   Wt 78.2 kg (172 lb 6.4 oz)   SpO2 99%   BMI 28.38 kg/m²     Physical Exam  Vitals reviewed.   Constitutional:       Appearance: Normal appearance. She is well-developed and overweight.   HENT:      Head: Normocephalic and atraumatic.      Right Ear: External ear normal.      Left Ear: External ear normal.   Eyes:      Conjunctiva/sclera: Conjunctivae normal.      Pupils: Pupils are equal, round, and reactive to light.   Cardiovascular:      Rate and Rhythm: Normal rate and regular rhythm.      Pulses: Normal pulses.      Heart sounds: Normal heart sounds.   Pulmonary:      Effort: Pulmonary effort is normal.      Breath sounds: Normal breath sounds.   Abdominal:      General: Abdomen is flat. Bowel sounds are normal.      Palpations: Abdomen is soft.   Musculoskeletal:         General: Normal range of motion.      Cervical back: Normal range of motion and neck supple.   Skin:     General: Skin is warm and dry.   Neurological:      Mental Status: She is alert and oriented to person, place, and time.   Psychiatric:         Mood and Affect: Mood normal.         Behavior: Behavior normal.         Thought Content: " Thought content normal.         Judgment: Judgment normal.         PHQ-9 Total Score:           Assessment/Plan:  Diagnoses and all orders for this visit:    1. Routine medical exam (Primary)    2. Screening for deficiency anemia  -     CBC & Differential; Future    3. Lipid screening  -     Comprehensive Metabolic Panel; Future  -     Lipid Panel; Future    4. Screening for metabolic disorder    5. Thyroid disorder screening  -     TSH Rfx On Abnormal To Free T4; Future       Assessment & Plan  1. Health maintenance.  - Routine labs fasting today  - She does not need any vaccines until fall, at which point she will receive the influenza and COVID-19 vaccines.  Continue with routine gynecology visits and dermatology visits.    2. Unexplained bruising.  - Reports frequent bruising on her legs without any clear cause.  - Blood count test will be performed to investigate potential anemia or other hematological issues.  - No other significant symptoms reported.    Patient Instructions   Preventive Care 21-39 Years Old, Female  Preventive care refers to lifestyle choices and visits with your health care provider that can promote health and wellness. Preventive care visits are also called wellness exams.  What can I expect for my preventive care visit?  Counseling  During your preventive care visit, your health care provider may ask about your:  Medical history, including:  Past medical problems.  Family medical history.  Pregnancy history.  Current health, including:  Menstrual cycle.  Method of birth control.  Emotional well-being.  Home life and relationship well-being.  Sexual activity and sexual health.  Lifestyle, including:  Alcohol, nicotine or tobacco, and drug use.  Access to firearms.  Diet, exercise, and sleep habits.  Work and work environment.  Sunscreen use.  Safety issues such as seatbelt and bike helmet use.  Physical exam  Your health care provider may check your:  Height and weight. These may be used to  calculate your BMI (body mass index). BMI is a measurement that tells if you are at a healthy weight.  Waist circumference. This measures the distance around your waistline. This measurement also tells if you are at a healthy weight and may help predict your risk of certain diseases, such as type 2 diabetes and high blood pressure.  Heart rate and blood pressure.  Body temperature.  Skin for abnormal spots.  What immunizations do I need?    Vaccines are usually given at various ages, according to a schedule. Your health care provider will recommend vaccines for you based on your age, medical history, and lifestyle or other factors, such as travel or where you work.  What tests do I need?  Screening  Your health care provider may recommend screening tests for certain conditions. This may include:  Pelvic exam and Pap test.  Lipid and cholesterol levels.  Diabetes screening. This is done by checking your blood sugar (glucose) after you have not eaten for a while (fasting).  Hepatitis B test.  Hepatitis C test.  HIV (human immunodeficiency virus) test.  STI (sexually transmitted infection) testing, if you are at risk.  BRCA-related cancer screening. This may be done if you have a family history of breast, ovarian, tubal, or peritoneal cancers.  Talk with your health care provider about your test results, treatment options, and if necessary, the need for more tests.  Follow these instructions at home:  Eating and drinking    Eat a healthy diet that includes fresh fruits and vegetables, whole grains, lean protein, and low-fat dairy products.  Take vitamin and mineral supplements as recommended by your health care provider.  Do not drink alcohol if:  Your health care provider tells you not to drink.  You are pregnant, may be pregnant, or are planning to become pregnant.  If you drink alcohol:  Limit how much you have to 0-1 drink a day.  Know how much alcohol is in your drink. In the [place] one drink equals one 12 oz  bottle of beer (355 mL), one 5 oz glass of wine (148 mL), or one 1½ oz glass of hard liquor (44 mL).  Lifestyle  Brush your teeth every morning and night with fluoride toothpaste. Floss one time each day.  Exercise for at least 30 minutes 5 or more days each week.  Do not use any products that contain nicotine or tobacco. These products include cigarettes, chewing tobacco, and vaping devices, such as e-cigarettes. If you need help quitting, ask your health care provider.  Do not use drugs.  If you are sexually active, practice safe sex. Use a condom or other form of protection to prevent STIs.  If you do not wish to become pregnant, use a form of birth control. If you plan to become pregnant, see your health care provider for a prepregnancy visit.  Find healthy ways to manage stress, such as:  Meditation, yoga, or listening to music.  Journaling.  Talking to a trusted person.  Spending time with friends and family.  Minimize exposure to UV radiation to reduce your risk of skin cancer.  Safety  Always wear your seat belt while driving or riding in a vehicle.  Do not drive:  If you have been drinking alcohol. Do not ride with someone who has been drinking.  If you have been using any mind-altering substances or drugs.  While texting.  When you are tired or distracted.  Wear a helmet and other protective equipment during sports activities.  If you have firearms in your house, make sure you follow all gun safety procedures.  Seek help if you have been physically or sexually abused.  What's next?  Go to your health care provider once a year for an annual wellness visit.  Ask your health care provider how often you should have your eyes and teeth checked.  Stay up to date on all vaccines.  This information is not intended to replace advice given to you by your health care provider. Make sure you discuss any questions you have with your health care provider.  Document Revised: 06/15/2022 Document Reviewed:  06/15/2022  [employer] Patient Education © 2024 [employer]    Counseling/Anticipatory Guidance:   Plan of care reviewed with patient at the conclusion of today's visit. Education was provided in regards to diagnosis, diet and exercise, cervical cancer screening, self breast exams, breast cancer screening, and the importance of yearly mammograms.   Nutrition, family planning/contraception, physical activity, healthy weight,ways to reduce stress, adequate sleep, injury prevention, misuse of tobacco, alcohol and drugs, sexual behavior and STD's, dental health, mental health, and immunizations.    Management and any prescribed or recommended OTC medications.  Patient verbalizes understanding of and agreement with management plan.    Return in about 1 year (around 7/9/2026) for Annual physical.    Dictated Utilizing Dragon Dictation.      DEEPIKA Milan  Patient or patient representative verbalized consent for the use of Ambient Listening during the visit with  DEEPIKA Milan for chart documentation. 7/9/2025  09:11 EDT

## 2025-07-16 ENCOUNTER — RESULTS FOLLOW-UP (OUTPATIENT)
Dept: INTERNAL MEDICINE | Facility: CLINIC | Age: 35
End: 2025-07-16
Payer: COMMERCIAL

## (undated) DEVICE — LUER-LOK 360°: Brand: CONNECTA, LUER-LOK

## (undated) DEVICE — GLV SURG SENSICARE PI MIC PF SZ8.5 LF STRL

## (undated) DEVICE — ENDOPOUCH RETRIEVER SPECIMEN RETRIEVAL BAGS: Brand: ENDOPOUCH RETRIEVER

## (undated) DEVICE — SNAP KOVER: Brand: UNBRANDED

## (undated) DEVICE — GLV SURG SENSICARE PI MIC PF SZ8 LF STRL

## (undated) DEVICE — SUT MNCRYL PLS ANTIB UD 4/0 PS2 18IN

## (undated) DEVICE — ENDOPATH XCEL UNIVERSAL TROCAR STABLILITY SLEEVES: Brand: ENDOPATH XCEL

## (undated) DEVICE — ENDOCUT SCISSOR TIP, DISPOSABLE: Brand: RENEW

## (undated) DEVICE — ENDOPATH XCEL BLADELESS TROCARS WITH STABILITY SLEEVES: Brand: ENDOPATH XCEL

## (undated) DEVICE — VISUALIZATION SYSTEM: Brand: CLEARIFY

## (undated) DEVICE — PENCL ROCKRSWCH MEGADYNE W/HOLSTR 10FT SS

## (undated) DEVICE — CVR HNDL LIGHT RIGID

## (undated) DEVICE — ST EXT IV TBG W SECUR LK 20IN

## (undated) DEVICE — SUT VIC 0 UR6 27IN VCP603H

## (undated) DEVICE — Device

## (undated) DEVICE — PK LAP LASR CHOLE 10

## (undated) DEVICE — GOWN,PREVENTION PLUS,XXLARGE,STERILE: Brand: MEDLINE

## (undated) DEVICE — SYR LUERLOK 30CC

## (undated) DEVICE — SYR LUERLOK 20CC BX/50

## (undated) DEVICE — [HIGH FLOW INSUFFLATOR,  DO NOT USE IF PACKAGE IS DAMAGED,  KEEP DRY,  KEEP AWAY FROM SUNLIGHT,  PROTECT FROM HEAT AND RADIOACTIVE SOURCES.]: Brand: PNEUMOSURE

## (undated) DEVICE — SUT SILK 2/0 TIES 18IN A185H